# Patient Record
Sex: FEMALE | Race: WHITE | NOT HISPANIC OR LATINO | Employment: PART TIME | URBAN - METROPOLITAN AREA
[De-identification: names, ages, dates, MRNs, and addresses within clinical notes are randomized per-mention and may not be internally consistent; named-entity substitution may affect disease eponyms.]

---

## 2017-01-11 ENCOUNTER — OFFICE VISIT (OUTPATIENT)
Dept: BEHAVIORAL HEALTH | Facility: PHYSICIAN GROUP | Age: 57
End: 2017-01-11
Payer: COMMERCIAL

## 2017-01-11 DIAGNOSIS — F33.1 MODERATE EPISODE OF RECURRENT MAJOR DEPRESSIVE DISORDER (HCC): ICD-10-CM

## 2017-01-11 PROCEDURE — 90834 PSYTX W PT 45 MINUTES: CPT | Performed by: MARRIAGE & FAMILY THERAPIST

## 2017-01-11 NOTE — BH THERAPY
" [facility]  Therapy Progress Note    Patient Name: Adelia Durant  Patient MRN: [id number]  Today's Date: 1/11/2017     Type of session:Individual psychotherapy  Length of session: 45  Persons in attendance:Patient    Subjective/New Info: con't significant anxiety and depression; is involved in [employer] study, [employer], lunch with friends, gym, library,    Objective/Observations:   Participation: Active verbal participation   Grooming: Casual   Cognition: Alert   Eye contact: Good   Mood: Depressed and Anxious   Affect: Sad, Anxious and Tearful   Thought process: Logical   Speech: Rate within normal limits   Other:     Diagnoses:   1. Moderate episode of recurrent major depressive disorder (HCC)         Current risk:   SUICIDE: Not applicable   Homicide: Not applicable   Self-harm: Not applicable   Relapse: Not applicable   Other:    Safety Plan reviewed? Not Indicated   If evidence of imminent risk is present, intervention/plan:     Therapeutic Intervention(s): Cognitive modification, Positive behavior reinforced, Self-care skills and Stressors assessed    Treatment Goal(s)/Objective(s) addressed: reduction in anxious thoughts and depressed mood     Progress toward Treatment Goals: No change    Plan:  - \"Homework\" recommendation: thought stopping, interrupt anxious thought pattern (verse, prayer, etc), reflect on pockets of happiness-look forward to activities, consider increasing volunteer work  - Next appointment scheduled:  2/1/2017    Leana Oro  1/11/2017                                   "

## 2017-01-16 ENCOUNTER — HOSPITAL ENCOUNTER (OUTPATIENT)
Facility: MEDICAL CENTER | Age: 57
End: 2017-01-16
Attending: FAMILY MEDICINE
Payer: COMMERCIAL

## 2017-01-16 ENCOUNTER — OFFICE VISIT (OUTPATIENT)
Dept: MEDICAL GROUP | Facility: CLINIC | Age: 57
End: 2017-01-16
Payer: COMMERCIAL

## 2017-01-16 VITALS
DIASTOLIC BLOOD PRESSURE: 80 MMHG | SYSTOLIC BLOOD PRESSURE: 140 MMHG | HEART RATE: 78 BPM | RESPIRATION RATE: 18 BRPM | WEIGHT: 133 LBS | TEMPERATURE: 98.2 F | HEIGHT: 63 IN | OXYGEN SATURATION: 100 % | BODY MASS INDEX: 23.57 KG/M2

## 2017-01-16 DIAGNOSIS — Z72.820 SLEEP DEFICIENT: ICD-10-CM

## 2017-01-16 DIAGNOSIS — Z01.419 WELL WOMAN EXAM WITH ROUTINE GYNECOLOGICAL EXAM: ICD-10-CM

## 2017-01-16 DIAGNOSIS — Z00.00 LABORATORY EXAMINATION ORDERED AS PART OF A ROUTINE GENERAL MEDICAL EXAMINATION: ICD-10-CM

## 2017-01-16 DIAGNOSIS — Z12.39 SCREENING BREAST EXAMINATION: ICD-10-CM

## 2017-01-16 LAB — CYTOLOGY REG CYTOL: NORMAL

## 2017-01-16 PROCEDURE — 88175 CYTOPATH C/V AUTO FLUID REDO: CPT

## 2017-01-16 PROCEDURE — 99000 SPECIMEN HANDLING OFFICE-LAB: CPT | Performed by: FAMILY MEDICINE

## 2017-01-16 PROCEDURE — 99396 PREV VISIT EST AGE 40-64: CPT | Performed by: FAMILY MEDICINE

## 2017-01-16 RX ORDER — UBIDECARENONE 30 MG
CAPSULE ORAL
COMMUNITY
End: 2019-12-20

## 2017-01-16 ASSESSMENT — ENCOUNTER SYMPTOMS
FOCAL WEAKNESS: 0
ABDOMINAL PAIN: 0
TINGLING: 0
SHORTNESS OF BREATH: 0
TREMORS: 0
FEVER: 0
SENSORY CHANGE: 0
DEPRESSION: 0
HEADACHES: 0
SEIZURES: 0
SPEECH CHANGE: 0
BLOOD IN STOOL: 0
ORTHOPNEA: 0
DOUBLE VISION: 0
MEMORY LOSS: 0
BLURRED VISION: 0
DIARRHEA: 0
HEMOPTYSIS: 0
NAUSEA: 0
CONSTIPATION: 0
WHEEZING: 0
COUGH: 0
VOMITING: 0
HALLUCINATIONS: 0
DIZZINESS: 0
PALPITATIONS: 0
SORE THROAT: 0
NECK PAIN: 0
INSOMNIA: 0
WEIGHT LOSS: 0
LOSS OF CONSCIOUSNESS: 0
SPUTUM PRODUCTION: 0
MYALGIAS: 0
NERVOUS/ANXIOUS: 0
HEARTBURN: 0
BRUISES/BLEEDS EASILY: 0
BACK PAIN: 1
CHILLS: 0

## 2017-01-16 ASSESSMENT — LIFESTYLE VARIABLES: SUBSTANCE_ABUSE: 0

## 2017-01-16 NOTE — MR AVS SNAPSHOT
"Adelia Durant   2017 10:40 AM   Office Visit   MRN: [id number]    Department:  [facility]   Dept Phone:  [phone]    Description:  Female : 1960   Provider:  Stephanie Martínez M.D.           Reason for Visit     Annual Exam Annual PE and PAP      Allergies as of 2017     Allergen Noted Reactions    Pcn [Penicillins] 2010       Tetanus Toxoid 2015   Hives, Rash    Body aches      You were diagnosed with     Well woman exam with routine gynecological exam   [931218]       Laboratory examination ordered as part of a routine general medical examination   [V72.62.ICD-9-CM]       Sleep deficient   [480389]       Screening breast examination   [577497]         Vital Signs     Blood Pressure Pulse Temperature Respirations Height Weight    140/80 mmHg 78 36.8 °C (98.2 °F) 18 1.6 m (5' 3\") 60.328 kg (133 lb)    Body Mass Index Oxygen Saturation Last Menstrual Period Breastfeeding? Smoking Status       23.57 kg/m2 100% 2006 No Former Smoker       Basic Information     Date Of Birth Sex Race Ethnicity Preferred Language    1960 Female White Non- English      Your appointments     2017  2:00 PM   Individual Therapy with [facility]    [email or web address]  [address]  [place] [address]   [phone]            2017  9:00 AM   Individual Therapy with [facility]    [email or web address]  [address]  [place] [address]   [phone]            Mar 15, 2017  2:00 PM   Individual Therapy with [facility]    [email or web address]  [address]  [place] [address]   [phone]              Problem List              ICD-10-CM Priority Class Noted - Resolved    History of carcinoma in situ of cervix uteri Z86.008   2011 - Present    Family history of breast cancer in mother Z80.3   Unknown - Present    GERD (gastroesophageal reflux disease) K21.9   " 2/17/2015 - Present    Sleep deficient Z72.820   11/8/2016 - Present    Depression F32.9   11/8/2016 - Present    Moderate episode of recurrent major depressive disorder (CMS-HCC) F33.1   11/30/2016 - Present      Health Maintenance        Date Due Completion Dates    IMM DTaP/Tdap/Td Vaccine (1 - Tdap) 5/17/1977 ---    MAMMOGRAM 9/3/2016 9/3/2015, 4/28/2014, 9/24/2012, 8/11/2011, 8/10/2011 (Prv Comp), 6/18/2010, 6/18/2010, 3/17/2009, 3/17/2009, 2/6/2008, 2/6/2008, 9/23/2005    Override on 8/10/2011: Previously completed    PAP SMEAR 8/24/2017 8/24/2015, 8/6/2015 (Postponed), 4/18/2014, 9/18/2012 (Prv Comp), 9/10/2012, 7/27/2011, 6/4/2010    Override on 8/6/2015: Postponed    Override on 9/18/2012: Previously completed    COLONOSCOPY 4/4/2018 4/4/2013 (Prv Comp), 3/31/2011 (Prv Comp), 7/10/2008 (Prv Comp)    Override on 4/4/2013: Previously completed (diverticulosis)    Override on 3/31/2011: Previously completed (done 2008, due 2013)    Override on 7/10/2008: Previously completed            Current Immunizations     Hepatitis B Vaccine Non-Recombivax (Ped/Adol) 2/16/2015, 10/20/2014, 8/12/2014    Influenza TIV (IM) 10/8/2016    Influenza Vaccine Pediatric 10/10/2009    Influenza Vaccine Quad Inj (Pf) 10/31/2015    Tuberculin Skin Test 4/13/2014  9:30 AM, 4/3/2014      Below and/or attached are the medications your provider expects you to take. Review all of your home medications and newly ordered medications with your provider and/or pharmacist. Follow medication instructions as directed by your provider and/or pharmacist. Please keep your medication list with you and share with your provider. Update the information when medications are discontinued, doses are changed, or new medications (including over-the-counter products) are added; and carry medication information at all times in the event of emergency situations     Allergies:  PCN - (reactions not documented)     TETANUS TOXOID - Hives,Rash                  Medications  Valid as of: January 16, 2017 - 12:49 PM    Generic Name Brand Name Tablet Size Instructions for use    Multiple Vitamins-Minerals   Take  by mouth.        Multiple Vitamins-Minerals (Tab) MULTI FOR HER 50+  Take  by mouth.        RaNITidine HCl (Tab) ZANTAC 150 MG Take 1 Tab by mouth 2 times a day.        .                 Medicines prescribed today were sent to:     [id number] - [place] - [address]    [address] [address]    Phone: [phone] Fax: [phone]    Open 24 Hours?: No      Medication refill instructions:       If your prescription bottle indicates you have medication refills left, it is not necessary to call your provider’s office. Please contact your pharmacy and they will refill your medication.    If your prescription bottle indicates you do not have any refills left, you may request refills at any time through one of the following ways: The online [employer] system (except Urgent Care), by calling your provider’s office, or by asking your pharmacy to contact your provider’s office with a refill request. Medication refills are processed only during regular business hours and may not be available until the next business day. Your provider may request additional information or to have a follow-up visit with you prior to refilling your medication.   *Please Note: Medication refills are assigned a new Rx number when refilled electronically. Your pharmacy may indicate that no refills were authorized even though a new prescription for the same medication is available at the pharmacy. Please request the medicine by name with the pharmacy before contacting your provider for a refill.        Your To Do List     Future Labs/Procedures Complete By Expires    CBC WITHOUT DIFFERENTIAL  As directed 7/19/2017    COMP METABOLIC PANEL  As directed 1/17/2018    LIPID PROFILE  As directed 1/17/2018    MA-SCREEN MAMMO W/CAD-BILAT  As directed 1/16/2018    THINPREP PAP, REFLEX HPV ON  ASC-US AND ABOVE  As directed 1/16/2018    VITAMIN D,25 HYDROXY  As directed 1/17/2018         MyChart Access Code: Activation code not generated  Current [employer] Status: Active

## 2017-01-16 NOTE — PROGRESS NOTES
Subjective:      Adelia Durant is a 56 y.o. female who presents with Annual Exam         She is here for her Well woman examination with pap.  She has been getting them yearly due to remote history of cervical KAREN I      Annual Exam  Associated symptoms include congestion. Pertinent negatives include no abdominal pain, chest pain, chills, coughing, fever, headaches, myalgias, nausea, neck pain, rash, sore throat or vomiting.      has a past medical history of GERD (gastroesophageal reflux disease); Multiple gastric polyps (2008); Family history of breast cancer in mother; Family history of colon cancer; Joint stiffness of hand; Carcinoma in situ of cervix (1980); Cataracts, bilateral; Depression; and Moderate episode of recurrent major depressive disorder (CMS-[facility]) (11/30/2016).   has past surgical history that includes dilation and curettage cone biopsy (1979); colonoscopy (2008, 4/2013); and cataract extraction with iol.  family history includes Alcohol abuse in her brother, father, and sister; Bipolar disorder in her father; Cancer in her maternal grandmother; Cancer (age of onset: 66) in her father and mother; Drug abuse in her brother; Heart Disease in her mother; Heart Disease (age of onset: 42) in her maternal uncle; Hypertension in her mother; Stroke in her maternal grandmother and paternal grandmother; Suicide Attempts in her child.   reports that she quit smoking about 33 years ago. Her smoking use included Cigarettes. She has a 2.5 pack-year smoking history. She has never used smokeless tobacco. She reports that she drinks about 4.2 oz of alcohol per week. She reports that she does not use illicit drugs.      Current outpatient prescriptions:   •  Multiple Vitamins-Minerals (MULTI FOR HER 50+) Tab, Take  by mouth., Disp: , Rfl:   •  ranitidine (ZANTAC) 150 MG Tab, Take 1 Tab by mouth 2 times a day., Disp: 60 Tab, Rfl: 11  •  Multiple Vitamins-Minerals (CENTRUM SILVER PO), Take  by mouth., Disp: , Rfl:  "  is allergic to pcn and tetanus toxoid.      Review of Systems   Constitutional: Negative for fever, chills, weight loss and malaise/fatigue.   HENT: Positive for congestion. Negative for hearing loss, nosebleeds and sore throat.    Eyes: Negative for blurred vision and double vision.   Respiratory: Negative for cough, hemoptysis, sputum production, shortness of breath and wheezing.    Cardiovascular: Negative for chest pain, palpitations, orthopnea and leg swelling.   Gastrointestinal: Negative for heartburn, nausea, vomiting, abdominal pain, diarrhea, constipation and blood in stool.        The zantac controls her symptoms most of the time   Genitourinary: Negative for dysuria, urgency and frequency.   Musculoskeletal: Positive for back pain. Negative for myalgias, joint pain and neck pain.        A little lower back stiffness.   Skin: Negative for rash.   Neurological: Negative for dizziness, tingling, tremors, sensory change, speech change, focal weakness, seizures, loss of consciousness and headaches.   Endo/Heme/Allergies: Positive for environmental allergies. Does not bruise/bleed easily.   Psychiatric/Behavioral: Negative for depression, suicidal ideas, hallucinations, memory loss and substance abuse. The patient is not nervous/anxious and does not have insomnia.           Objective:     /80 mmHg  Pulse 78  Temp(Src) 36.8 °C (98.2 °F)  Resp 18  Ht 1.6 m (5' 3\")  Wt 60.328 kg (133 lb)  BMI 23.57 kg/m2  SpO2 100%  LMP 07/27/2006  Breastfeeding? No     Physical Exam   Constitutional: She is oriented to person, place, and time. She appears well-developed and well-nourished. No distress.   HENT:   Head: Normocephalic and atraumatic.   Mouth/Throat: Oropharynx is clear and moist.   Eyes: EOM are normal. Pupils are equal, round, and reactive to light. Left eye exhibits no discharge.   Neck: Normal range of motion. Neck supple. No JVD present. No thyromegaly present.   Cardiovascular: Normal rate, " regular rhythm and normal heart sounds.    No murmur heard.  Pulmonary/Chest: Effort normal and breath sounds normal. No respiratory distress. She has no wheezes. She has no rales. Right breast exhibits no inverted nipple, no mass, no nipple discharge, no skin change and no tenderness. Left breast exhibits no inverted nipple, no mass, no nipple discharge, no skin change and no tenderness. Breasts are symmetrical.   Abdominal: Soft. Bowel sounds are normal. She exhibits no distension and no mass. There is no tenderness.   Genitourinary: Vagina normal and uterus normal. No breast tenderness, discharge or bleeding. Cervix exhibits no discharge. Right adnexum displays no mass. Left adnexum displays no mass. No vaginal discharge found.   Pap smear (brush and spatula) taken and sent   Musculoskeletal: Normal range of motion. She exhibits no edema.   Lymphadenopathy:     She has no cervical adenopathy.   Neurological: She is alert and oriented to person, place, and time. Coordination normal.   Skin: Skin is warm and dry. No rash noted. No erythema.   Psychiatric: She has a normal mood and affect. Her behavior is normal.   Vitals reviewed.              Assessment/Plan:     1. Well woman exam with routine gynecological exam  Generally well, await Pap  - THINPREP PAP, REFLEX HPV ON ASC-US AND ABOVE; Future    2. Laboratory examination ordered as part of a routine general medical examination  Routine orders placed  - COMP METABOLIC PANEL; Future  - LIPID PROFILE; Future  - CBC WITHOUT DIFFERENTIAL; Future    3. Sleep deficient  Order placed and discussed  - VITAMIN D,25 HYDROXY; Future    4. Screening breast examination  Routine orders placed  - MA-SCREEN MAMMO W/CAD-BILAT; Future

## 2017-01-17 ENCOUNTER — HOSPITAL ENCOUNTER (OUTPATIENT)
Dept: LAB | Facility: MEDICAL CENTER | Age: 57
End: 2017-01-17
Attending: FAMILY MEDICINE
Payer: COMMERCIAL

## 2017-01-17 DIAGNOSIS — Z00.00 LABORATORY EXAMINATION ORDERED AS PART OF A ROUTINE GENERAL MEDICAL EXAMINATION: ICD-10-CM

## 2017-01-17 DIAGNOSIS — Z72.820 SLEEP DEFICIENT: ICD-10-CM

## 2017-01-17 LAB
25(OH)D3 SERPL-MCNC: 27 NG/ML (ref 30–100)
ALBUMIN SERPL BCP-MCNC: 4.2 G/DL (ref 3.2–4.9)
ALBUMIN/GLOB SERPL: 1.2 G/DL
ALP SERPL-CCNC: 49 U/L (ref 30–99)
ALT SERPL-CCNC: 14 U/L (ref 2–50)
ANION GAP SERPL CALC-SCNC: 6 MMOL/L (ref 0–11.9)
AST SERPL-CCNC: 16 U/L (ref 12–45)
BILIRUB SERPL-MCNC: 0.5 MG/DL (ref 0.1–1.5)
BUN SERPL-MCNC: 16 MG/DL (ref 8–22)
CALCIUM SERPL-MCNC: 9.3 MG/DL (ref 8.5–10.5)
CHLORIDE SERPL-SCNC: 108 MMOL/L (ref 96–112)
CHOLEST SERPL-MCNC: 176 MG/DL (ref 100–199)
CO2 SERPL-SCNC: 27 MMOL/L (ref 20–33)
CREAT SERPL-MCNC: 0.78 MG/DL (ref 0.5–1.4)
ERYTHROCYTE [DISTWIDTH] IN BLOOD BY AUTOMATED COUNT: 46.8 FL (ref 35.9–50)
GLOBULIN SER CALC-MCNC: 3.4 G/DL (ref 1.9–3.5)
GLUCOSE SERPL-MCNC: 92 MG/DL (ref 65–99)
HCT VFR BLD AUTO: 42.5 % (ref 37–47)
HDLC SERPL-MCNC: 88 MG/DL
HGB BLD-MCNC: 13.7 G/DL (ref 12–16)
LDLC SERPL CALC-MCNC: 76 MG/DL
MCH RBC QN AUTO: 31.7 PG (ref 27–33)
MCHC RBC AUTO-ENTMCNC: 32.2 G/DL (ref 33.6–35)
MCV RBC AUTO: 98.4 FL (ref 81.4–97.8)
PLATELET # BLD AUTO: 280 K/UL (ref 164–446)
PMV BLD AUTO: 10.1 FL (ref 9–12.9)
POTASSIUM SERPL-SCNC: 4 MMOL/L (ref 3.6–5.5)
PROT SERPL-MCNC: 7.6 G/DL (ref 6–8.2)
RBC # BLD AUTO: 4.32 M/UL (ref 4.2–5.4)
SODIUM SERPL-SCNC: 141 MMOL/L (ref 135–145)
TRIGL SERPL-MCNC: 59 MG/DL (ref 0–149)
WBC # BLD AUTO: 4 K/UL (ref 4.8–10.8)

## 2017-01-17 PROCEDURE — 82306 VITAMIN D 25 HYDROXY: CPT

## 2017-01-17 PROCEDURE — 85027 COMPLETE CBC AUTOMATED: CPT

## 2017-01-17 PROCEDURE — 80061 LIPID PANEL: CPT

## 2017-01-17 PROCEDURE — 36415 COLL VENOUS BLD VENIPUNCTURE: CPT

## 2017-01-17 PROCEDURE — 80053 COMPREHEN METABOLIC PANEL: CPT

## 2017-02-01 ENCOUNTER — OFFICE VISIT (OUTPATIENT)
Dept: BEHAVIORAL HEALTH | Facility: PHYSICIAN GROUP | Age: 57
End: 2017-02-01
Payer: COMMERCIAL

## 2017-02-01 DIAGNOSIS — F33.1 MODERATE EPISODE OF RECURRENT MAJOR DEPRESSIVE DISORDER (HCC): ICD-10-CM

## 2017-02-01 PROCEDURE — 90834 PSYTX W PT 45 MINUTES: CPT | Performed by: MARRIAGE & FAMILY THERAPIST

## 2017-02-02 NOTE — BH THERAPY
" [facility]  Therapy Progress Note    Patient Name: Adelia Durant  Patient MRN: [id number]  Today's Date: 2/1/2017     Type of session:Individual psychotherapy  Length of session: 45  Persons in attendance:Patient    Subjective/New Info: Adelia stated she feels she can't \"pull herself out of it\" (depressed mood)-has had an ongoing cold that is draining her physical energy and adding to her depressed mood.  She's still ambivalent about starting an anti-depressant but is willing to consider asking PCP about it.  PCP wants to see lab work results before referring for a sleep study.  Adelia describes herself as \"absent minded\" which in turns impacts her remembering to use techniques to combat depression and anxiety (absent mindedness tends to be when she is stressed or experiencing an increase in anxiety).      Objective/Observations:   Participation: Active verbal participation   Grooming: Casual   Cognition: Alert   Eye contact: Good   Mood: Depressed and Anxious   Affect: Flexible   Thought process: Logical   Speech: Rate within normal limits   Other:     Diagnoses:   1. Moderate episode of recurrent major depressive disorder (CMS-[facility])         Current risk:   SUICIDE: Not applicable   Homicide: Not applicable   Self-harm: Not applicable   Relapse: Not applicable   Other:    Safety Plan reviewed? Not Indicated   If evidence of imminent risk is present, intervention/plan:     Therapeutic Intervention(s): Cognitive modification, Positive behavior reinforced, Self-care skills and Stressors assessed    Treatment Goal(s)/Objective(s) addressed: cognitive restructuring and thought stopping for anxiousdep thoughts; behaviors to help lift her mood;      Progress toward Treatment Goals: Mild improvement and Adelia did implement music at times to help with mood    Plan:  - \"Homework\" recommendation: con't to work on cognitive restructuring and behaviors to manage stress, anxiety and depression  - Next " appointment scheduled:  2/28/2017    Leana Oro  2/1/2017

## 2017-02-10 ENCOUNTER — OFFICE VISIT (OUTPATIENT)
Dept: URGENT CARE | Facility: PHYSICIAN GROUP | Age: 57
End: 2017-02-10
Payer: COMMERCIAL

## 2017-02-10 VITALS
WEIGHT: 133 LBS | RESPIRATION RATE: 20 BRPM | SYSTOLIC BLOOD PRESSURE: 146 MMHG | HEIGHT: 63 IN | HEART RATE: 80 BPM | DIASTOLIC BLOOD PRESSURE: 80 MMHG | TEMPERATURE: 97.9 F | BODY MASS INDEX: 23.57 KG/M2 | OXYGEN SATURATION: 98 %

## 2017-02-10 DIAGNOSIS — E55.9 VITAMIN D DEFICIENCY: ICD-10-CM

## 2017-02-10 DIAGNOSIS — J34.89 NASAL SORE: ICD-10-CM

## 2017-02-10 DIAGNOSIS — J01.00 ACUTE NON-RECURRENT MAXILLARY SINUSITIS: ICD-10-CM

## 2017-02-10 PROCEDURE — 99214 OFFICE O/P EST MOD 30 MIN: CPT | Performed by: PHYSICIAN ASSISTANT

## 2017-02-10 RX ORDER — AZITHROMYCIN 250 MG/1
TABLET, FILM COATED ORAL
Qty: 6 TAB | Refills: 0 | Status: SHIPPED | OUTPATIENT
Start: 2017-02-10 | End: 2017-06-07

## 2017-02-10 RX ORDER — CHLORAL HYDRATE 500 MG
1000 CAPSULE ORAL
COMMUNITY
End: 2023-06-20

## 2017-02-10 ASSESSMENT — ENCOUNTER SYMPTOMS
WHEEZING: 0
VOMITING: 0
SINUS PRESSURE: 1
HEADACHES: 1
SPUTUM PRODUCTION: 1
PALPITATIONS: 0
NAUSEA: 0
CHILLS: 1
DIARRHEA: 0
MYALGIAS: 0
SORE THROAT: 0
FEVER: 1
COUGH: 1
SHORTNESS OF BREATH: 0
ABDOMINAL PAIN: 0

## 2017-02-10 ASSESSMENT — PAIN SCALES - GENERAL: PAINLEVEL: NO PAIN

## 2017-02-10 NOTE — PROGRESS NOTES
Subjective:      Adelia Durant is a 56 y.o. female who presents with Sinus Problem            Sinus Problem  This is a new problem. The current episode started 1 to 4 weeks ago. The problem has been gradually worsening since onset. There has been no fever. Associated symptoms include chills, congestion, coughing, ear pain, headaches and sinus pressure. Pertinent negatives include no shortness of breath or sore throat. Past treatments include oral decongestants (OTC cough). The treatment provided mild relief.   Patient states when she gets sinus infections she gets sores on her nose. Bactroban always helps resolve this problem.    Patient is asking for her lab results from her recent primary care visit.      PMH:  has a past medical history of GERD (gastroesophageal reflux disease); Multiple gastric polyps (2008); Family history of breast cancer in mother; Family history of colon cancer; Joint stiffness of hand; Carcinoma in situ of cervix (1980); Cataracts, bilateral; Depression; and Moderate episode of recurrent major depressive disorder (CMS-HCC) (11/30/2016).  MEDS:   Current outpatient prescriptions:   •  Omega-3 Fatty Acids (FISH OIL) 1000 MG Cap capsule, Take 1,000 mg by mouth 3 times a day, with meals., Disp: , Rfl:   •  Multiple Vitamins-Minerals (MULTI FOR HER 50+) Tab, Take  by mouth., Disp: , Rfl:   •  ranitidine (ZANTAC) 150 MG Tab, Take 1 Tab by mouth 2 times a day., Disp: 60 Tab, Rfl: 11  •  Multiple Vitamins-Minerals (CENTRUM SILVER PO), Take  by mouth., Disp: , Rfl:   ALLERGIES:   Allergies   Allergen Reactions   • Pcn [Penicillins]    • Tetanus Toxoid Hives and Rash     Body aches     SURGHX:   Past Surgical History   Procedure Laterality Date   • Dilation and curettage cone biopsy  1979     abnormal pap, carcinoma in situ   • Colonoscopy  2008, 4/2013     normal except for diverticulosis, Dr. Ozuna   • Cataract extraction with iol       Dr. Finn     SOCHX:  reports that she quit smoking about  "33 years ago. Her smoking use included Cigarettes. She has a 2.5 pack-year smoking history. She has never used smokeless tobacco. She reports that she drinks about 4.2 oz of alcohol per week. She reports that she does not use illicit drugs.  FH: family history includes Alcohol abuse in her brother, father, and sister; Bipolar disorder in her father; Cancer in her maternal grandmother; Cancer (age of onset: 66) in her father and mother; Drug abuse in her brother; Heart Disease in her mother; Heart Disease (age of onset: 42) in her maternal uncle; Hypertension in her mother; Stroke in her maternal grandmother and paternal grandmother; Suicide Attempts in her child.    Review of Systems   Constitutional: Positive for fever, chills and malaise/fatigue.   HENT: Positive for congestion, ear pain and sinus pressure. Negative for sore throat.         Nasal sores   Respiratory: Positive for cough and sputum production (Green). Negative for shortness of breath and wheezing.    Cardiovascular: Negative for chest pain, palpitations and leg swelling.   Gastrointestinal: Negative for nausea, vomiting, abdominal pain and diarrhea.   Musculoskeletal: Negative for myalgias.   Neurological: Positive for headaches.       Medications, Allergies, and current problem list reviewed today in Epic     Objective:     /80 mmHg  Pulse 80  Temp(Src) 36.6 °C (97.9 °F)  Resp 20  Ht 1.6 m (5' 3\")  Wt 60.328 kg (133 lb)  BMI 23.57 kg/m2  SpO2 98%  LMP 07/27/2006  Breastfeeding? No     Physical Exam   Constitutional: She is oriented to person, place, and time. She appears well-developed and well-nourished. No distress.   HENT:   Head: Normocephalic and atraumatic.   Right Ear: Hearing, tympanic membrane, external ear and ear canal normal. Tympanic membrane is not erythematous. No decreased hearing is noted.   Left Ear: Hearing, tympanic membrane, external ear and ear canal normal. Tympanic membrane is not erythematous. No decreased " hearing is noted.   Nose: Right sinus exhibits maxillary sinus tenderness. Left sinus exhibits maxillary sinus tenderness.   Mouth/Throat: Normal dentition. Posterior oropharyngeal erythema present. No oropharyngeal exudate.   Several scabs seen in the bilateral nostrils with dried blood.   Eyes: Conjunctivae and EOM are normal. Pupils are equal, round, and reactive to light. Right eye exhibits no discharge. Left eye exhibits no discharge. No scleral icterus.   Neck: Normal range of motion. Neck supple.   Cardiovascular: Normal rate, regular rhythm and normal heart sounds.    No murmur heard.  Pulmonary/Chest: Effort normal and breath sounds normal. No respiratory distress. She has no wheezes.   Lymphadenopathy:        Head (right side): Submandibular adenopathy present.        Head (left side): Submandibular adenopathy present.     She has no cervical adenopathy.        Right cervical: No superficial cervical adenopathy present.       Left cervical: No superficial cervical adenopathy present.   Neurological: She is alert and oriented to person, place, and time.   Skin: Skin is warm, dry and intact. She is not diaphoretic. No cyanosis. Nails show no clubbing.   Psychiatric: She has a normal mood and affect. Her behavior is normal. Judgment and thought content normal.   Nursing note and vitals reviewed.              Assessment/Plan:     1. Acute non-recurrent maxillary sinusitis  azithromycin (ZITHROMAX) 250 MG Tab   2. Nasal sore  mupirocin (BACTROBAN) 2 % Ointment   3. Vitamin D deficiency       Take full course of antibiotic  Tylenol and Motrin for fever and pain  OTC meds as discussed   Increase fluids and rest  Nasal spray, nasal wash, Rosie pot  Bactroban for sores in nose twice a day    All recent lab work discussed with patient. She has a mild vitamin D deficiency. She was told to begin taking 2000 units of vitamin D daily. Follow-up with primary care.    Return to clinic or go to ED if symptoms worsen or  persist. Indications for ED discussed at length. Patient voices understanding. Follow-up with your primary care provider in 3-5 days. Red flags discussed.    Please note that this dictation was created using voice recognition software. I have made every reasonable attempt to correct obvious errors, but I expect that there are errors of grammar and possibly content that I did not discover before finalizing the note.

## 2017-02-28 ENCOUNTER — OFFICE VISIT (OUTPATIENT)
Dept: BEHAVIORAL HEALTH | Facility: PHYSICIAN GROUP | Age: 57
End: 2017-02-28
Payer: COMMERCIAL

## 2017-02-28 DIAGNOSIS — F41.9 ANXIETY: ICD-10-CM

## 2017-02-28 DIAGNOSIS — F33.1 MODERATE EPISODE OF RECURRENT MAJOR DEPRESSIVE DISORDER (HCC): ICD-10-CM

## 2017-02-28 PROCEDURE — 90834 PSYTX W PT 45 MINUTES: CPT | Performed by: MARRIAGE & FAMILY THERAPIST

## 2017-02-28 NOTE — BH THERAPY
" [facility]  Therapy Progress Note    Patient Name: Adelia Durant  Patient MRN: [id number]  Today's Date: 2/28/2017     Type of session:Individual psychotherapy  Length of session: 50  Persons in attendance:Patient    Subjective/New Info: Diana reported a mild decrease in depression and anxiety- + outcomes with knitting, music, lighting, normalizing mistakes, & thought stopping.  Diana con't to struggle with restructuring neg/anx thoughts and letting goof those thoughts.  Is taking Vit D as labs indicated she was low; acknowledges her mood lifts on kellee days.     Objective/Observations:   Participation: Active verbal participation   Grooming: Casual   Cognition: Alert   Eye contact: Good   Mood: Depressed and Anxious   Affect: Flexible   Thought process: Logical   Speech: Rate within normal limits   Other:     Diagnoses:   1. Moderate episode of recurrent major depressive disorder (CMS-HCC)    2. Anxiety         Current risk:   SUICIDE: Not applicable   Homicide: Not applicable   Self-harm: Not applicable   Relapse: Not applicable   Other:    Safety Plan reviewed? Not Indicated   If evidence of imminent risk is present, intervention/plan:     Therapeutic Intervention(s): Cognitive modification, Maladaptive behavior addressed, Positive behavior reinforced, Self-care skills and Stressors assessed    Treatment Goal(s)/Objective(s) addressed: cog. Modification of negative and anxious thoughts     Progress toward Treatment Goals: Mild improvement    Plan:  - \"Homework\" recommendation: con't to use lighting, music, knitting, etc. To maintain improvement; use coloring, knitting etc. Vs tv if awakens during the night-can't go back to sleep get up and use the time for prayer/journaling/reading  - Next appointment scheduled:  3/15/2017    Leana Oro  2/28/2017                                   "

## 2017-03-15 ENCOUNTER — OFFICE VISIT (OUTPATIENT)
Dept: BEHAVIORAL HEALTH | Facility: PHYSICIAN GROUP | Age: 57
End: 2017-03-15
Payer: COMMERCIAL

## 2017-03-15 DIAGNOSIS — F33.1 MODERATE EPISODE OF RECURRENT MAJOR DEPRESSIVE DISORDER (HCC): ICD-10-CM

## 2017-03-15 PROCEDURE — 90834 PSYTX W PT 45 MINUTES: CPT | Performed by: MARRIAGE & FAMILY THERAPIST

## 2017-03-15 NOTE — BH THERAPY
" [facility]  Therapy Progress Note    Patient Name: Adelia Durant  Patient MRN: [id number]  Today's Date: 3/15/2017     Type of session:Individual psychotherapy  Length of session: 45  Persons in attendance:Patient    Subjective/New Info: Ramakrishna was called to report for jury duty-interferes with her plans to visit her son in [place].  Spoke today of frightening and sad incidents from her childhood-likely developed into anxiety she con't to experience.      Objective/Observations:   Participation: Active verbal participation   Grooming: Casual   Cognition: Alert   Eye contact: Good   Mood: Depressed and Anxious   Affect: Sad and Tearful   Thought process: Logical   Speech: Rate within normal limits   Other:     Diagnoses:   1. Moderate episode of recurrent major depressive disorder (CMS-HCC)         Current risk:   SUICIDE: Not applicable   Homicide: Not applicable   Self-harm: Not applicable   Relapse: Not applicable   Other:    Safety Plan reviewed? Not Indicated   If evidence of imminent risk is present, intervention/plan:     Therapeutic Intervention(s): Cognitive modification, Parenting/familial roles addressed, Positive behavior reinforced, Self-care skills and Stressors assessed    Treatment Goal(s)/Objective(s) addressed: effectively managing anxious/depressive thoughts and resulting behaviors     Progress toward Treatment Goals: Mild improvement and Ramakrishna is beginning to delve into deeper issues    Plan:  - \"Homework\" recommendation: allow yourself to enjoy little pockets of happiness/sereness  - Next appointment scheduled:  4/4/2017    Leana Oro  3/15/2017                                   "

## 2017-03-29 ENCOUNTER — NON-PROVIDER VISIT (OUTPATIENT)
Dept: URGENT CARE | Facility: PHYSICIAN GROUP | Age: 57
End: 2017-03-29

## 2017-03-29 DIAGNOSIS — Z11.1 SPECIAL SCREENING EXAMINATION FOR RESPIRATORY TUBERCULOSIS: ICD-10-CM

## 2017-03-29 PROCEDURE — 86580 TB INTRADERMAL TEST: CPT | Performed by: FAMILY MEDICINE

## 2017-03-31 ENCOUNTER — NON-PROVIDER VISIT (OUTPATIENT)
Dept: URGENT CARE | Facility: PHYSICIAN GROUP | Age: 57
End: 2017-03-31

## 2017-03-31 DIAGNOSIS — Z11.1 ENCOUNTER FOR PPD SKIN TEST READING: ICD-10-CM

## 2017-03-31 LAB — TB WHEAL 3D P 5 TU DIAM: NORMAL MM

## 2017-03-31 NOTE — MR AVS SNAPSHOT
Adelia Durant   3/31/2017 11:45 AM   Non-Provider Visit   MRN: [id number]    Department:  [facility]   Dept Phone:  [phone]    Description:  Female : 1960   Provider:  [facility]           Reason for Visit     PPD Reading 1 of 1      Allergies as of 3/31/2017     Allergen Noted Reactions    Pcn [Penicillins] 2010       Tetanus Toxoid 2015   Hives, Rash    Body aches      You were diagnosed with     Encounter for PPD skin test reading   [[id number]]         Vital Signs     Last Menstrual Period Smoking Status                2006 Former Smoker          Basic Information     Date Of Birth Sex Race Ethnicity Preferred Language    1960 Female White Non- English      Your appointments     2017  9:00 AM   Individual Therapy with [facility] ([place])    [address]  [address]  [place] [id number] [phone]            May 10, 2017  2:00 PM   Individual Therapy with [facility] ([place])    [address]  [address]  [place] [id number] [phone]              Problem List              ICD-10-CM Priority Class Noted - Resolved    History of carcinoma in situ of cervix uteri Z86.008   2011 - Present    Family history of breast cancer in mother Z80.3   Unknown - Present    GERD (gastroesophageal reflux disease) K21.9   2015 - Present    Sleep deficient Z72.820   2016 - Present    Depression F32.9   2016 - Present    Moderate episode of recurrent major depressive disorder (CMS-HCC) F33.1   2016 - Present    Anxiety F41.9   2017 - Present      Health Maintenance        Date Due Completion Dates    IMM DTaP/Tdap/Td Vaccine (1 - Tdap) 1977 ---    MAMMOGRAM 9/3/2016 9/3/2015, 2014, 2012, 2011, 8/10/2011 (Prv Comp), 2010, 2010, 3/17/2009, 3/17/2009, 2008, 2008, 2005    Override on 8/10/2011: Previously completed    COLONOSCOPY 4/4/2018 4/4/2013 (Prv Comp), 3/31/2011 (Prv Comp), 7/10/2008 (Prv Comp)    Override on 4/4/2013: Previously completed (diverticulosis)    Override on 3/31/2011: Previously completed (done 2008, due 2013)    Override on 7/10/2008: Previously completed    PAP SMEAR 1/16/2019 1/16/2017, 8/24/2015, 8/6/2015 (Postponed), 4/18/2014, 9/18/2012 (Prv Comp), 9/10/2012, 7/27/2011, 6/4/2010    Override on 8/6/2015: Postponed    Override on 9/18/2012: Previously completed            Current Immunizations     Hepatitis B Vaccine Non-Recombivax (Ped/Adol) 2/16/2015, 10/20/2014, 8/12/2014    Influenza TIV (IM) 10/8/2016    Influenza Vaccine Pediatric 10/10/2009    Influenza Vaccine Quad Inj (Pf) 10/31/2015    Tuberculin Skin Test 3/29/2017, 4/13/2014  9:30 AM, 4/3/2014      Below and/or attached are the medications your provider expects you to take. Review all of your home medications and newly ordered medications with your provider and/or pharmacist. Follow medication instructions as directed by your provider and/or pharmacist. Please keep your medication list with you and share with your provider. Update the information when medications are discontinued, doses are changed, or new medications (including over-the-counter products) are added; and carry medication information at all times in the event of emergency situations     Allergies:  PCN - (reactions not documented)     TETANUS TOXOID - Hives,Rash               Medications  Valid as of: March 31, 2017 - 11:51 AM    Generic Name Brand Name Tablet Size Instructions for use    Azithromycin (Tab) ZITHROMAX 250 MG Z-stephen, Use as directed.        Multiple Vitamins-Minerals   Take  by mouth.        Multiple Vitamins-Minerals (Tab) MULTI FOR HER 50+  Take  by mouth.        Mupirocin (Ointment) BACTROBAN 2 % Apply 1 Application to affected area(s) 2 times a day.        Omega-3 Fatty Acids (Cap) fish oil 1000 MG Take 1,000 mg by mouth 3 times a day, with meals.         RaNITidine HCl (Tab) ZANTAC 150 MG Take 1 Tab by mouth 2 times a day.        .                 Medicines prescribed today were sent to:     [id number] - [place] - [address] [place] [address]    Phone: [phone] Fax: [phone]    Open 24 Hours?: No      Medication refill instructions:       If your prescription bottle indicates you have medication refills left, it is not necessary to call your provider’s office. Please contact your pharmacy and they will refill your medication.    If your prescription bottle indicates you do not have any refills left, you may request refills at any time through one of the following ways: The online Enbridge system (except Urgent Care), by calling your provider’s office, or by asking your pharmacy to contact your provider’s office with a refill request. Medication refills are processed only during regular business hours and may not be available until the next business day. Your provider may request additional information or to have a follow-up visit with you prior to refilling your medication.   *Please Note: Medication refills are assigned a new Rx number when refilled electronically. Your pharmacy may indicate that no refills were authorized even though a new prescription for the same medication is available at the pharmacy. Please request the medicine by name with the pharmacy before contacting your provider for a refill.           Enbridge Access Code: Activation code not generated  Current Enbridge Status: Active

## 2017-03-31 NOTE — PROGRESS NOTES
Adelia Durant is a 56 y.o. female here for a non-provider visit for PPD reading -- Step 1 of 1.      1.  Resulted in Epic under enter/edit results? Yes   2.  TB evaluation questionnaire scanned into chart and original given to patient?Yes      3. Was induration greater than 0 mm? No.        Routed to PCP? No

## 2017-04-04 ENCOUNTER — OFFICE VISIT (OUTPATIENT)
Dept: BEHAVIORAL HEALTH | Facility: PHYSICIAN GROUP | Age: 57
End: 2017-04-04
Payer: COMMERCIAL

## 2017-04-04 DIAGNOSIS — F33.1 MODERATE EPISODE OF RECURRENT MAJOR DEPRESSIVE DISORDER (HCC): ICD-10-CM

## 2017-04-04 DIAGNOSIS — F41.1 GAD (GENERALIZED ANXIETY DISORDER): ICD-10-CM

## 2017-04-04 PROCEDURE — 90834 PSYTX W PT 45 MINUTES: CPT | Performed by: MARRIAGE & FAMILY THERAPIST

## 2017-04-04 NOTE — PROGRESS NOTES
Adelia is looking forward to this weekend when she and her H will be leaving for [place] to visit their son.  She will fly from [place] to [place] to visit her family.  Ramakrishna has been using strategies learned in session (music, knitting, etc.) to help manage her stress; she reported + outcomes.  Ramakrishna spoke more about past childhood experiences that have recently surfaced; she has good insight as to how these experiences have manifested itself in anxiety/dep.

## 2017-05-10 ENCOUNTER — OFFICE VISIT (OUTPATIENT)
Dept: BEHAVIORAL HEALTH | Facility: PHYSICIAN GROUP | Age: 57
End: 2017-05-10
Payer: COMMERCIAL

## 2017-05-10 DIAGNOSIS — F41.1 GAD (GENERALIZED ANXIETY DISORDER): ICD-10-CM

## 2017-05-10 PROCEDURE — 90834 PSYTX W PT 45 MINUTES: CPT | Performed by: MARRIAGE & FAMILY THERAPIST

## 2017-06-01 ENCOUNTER — HOSPITAL ENCOUNTER (OUTPATIENT)
Dept: RADIOLOGY | Facility: MEDICAL CENTER | Age: 57
End: 2017-06-01
Attending: FAMILY MEDICINE
Payer: COMMERCIAL

## 2017-06-01 DIAGNOSIS — Z12.31 VISIT FOR SCREENING MAMMOGRAM: ICD-10-CM

## 2017-06-01 DIAGNOSIS — Z12.39 SCREENING BREAST EXAMINATION: ICD-10-CM

## 2017-06-01 PROCEDURE — 77063 BREAST TOMOSYNTHESIS BI: CPT

## 2017-06-07 ENCOUNTER — OFFICE VISIT (OUTPATIENT)
Dept: MEDICAL GROUP | Facility: CLINIC | Age: 57
End: 2017-06-07
Payer: COMMERCIAL

## 2017-06-07 VITALS
HEIGHT: 63 IN | OXYGEN SATURATION: 100 % | HEART RATE: 65 BPM | DIASTOLIC BLOOD PRESSURE: 62 MMHG | RESPIRATION RATE: 18 BRPM | WEIGHT: 134 LBS | TEMPERATURE: 98.6 F | SYSTOLIC BLOOD PRESSURE: 126 MMHG | BODY MASS INDEX: 23.74 KG/M2

## 2017-06-07 DIAGNOSIS — R42 INTERMITTENT VERTIGO: ICD-10-CM

## 2017-06-07 PROCEDURE — 99214 OFFICE O/P EST MOD 30 MIN: CPT | Performed by: NURSE PRACTITIONER

## 2017-06-07 RX ORDER — ONDANSETRON 4 MG/1
4 TABLET, FILM COATED ORAL EVERY 4 HOURS PRN
Qty: 20 TAB | Refills: 1 | Status: SHIPPED | OUTPATIENT
Start: 2017-06-07 | End: 2018-05-15 | Stop reason: SDUPTHER

## 2017-06-07 RX ORDER — FLUTICASONE PROPIONATE 50 MCG
1 SPRAY, SUSPENSION (ML) NASAL DAILY
Qty: 16 G | Refills: 5 | Status: SHIPPED | OUTPATIENT
Start: 2017-06-07 | End: 2018-05-15

## 2017-06-07 ASSESSMENT — PATIENT HEALTH QUESTIONNAIRE - PHQ9: CLINICAL INTERPRETATION OF PHQ2 SCORE: 0

## 2017-06-08 NOTE — PROGRESS NOTES
CC: Dizziness        HPI:     Adelia is a patient of Dr. Martínez, all problems are new to me today presents today for the followin. Intermittent vertigo  Tells me that over the last year she's had 2-5 episodes of self resolving dizziness. During the bad episodes which may be were about 2 of these episodes she had some associated nausea. The last most recent episode happened last week. She woke up feeling fine got up out of bed medially felt the room was spinning and felt like she was given a topple forward then she began having some nausea and vomiting. She states during that morning the symptoms resolved on her own over time.    She has had increased pressure of the left ear over the last several weeks. She tried Allegra-D which made her feel kind of sick and hyper. She tried Flonase but she was concerned as she is prone to nasal ulcers.    Previously all of her other episode self resolved over a day or 2.    Always triggered by movement of the head. She felt that she kept her head perfectly still that she was able to feel fine.    Currently has not had the symptoms for several days.    Current Outpatient Prescriptions   Medication Sig Dispense Refill   • ondansetron (ZOFRAN) 4 MG Tab tablet Take 1 Tab by mouth every four hours as needed for Nausea/Vomiting. 20 Tab 1   • fluticasone (FLONASE) 50 MCG/ACT nasal spray Spray 1 Spray in nose every day. 16 g 5   • Omega-3 Fatty Acids (FISH OIL) 1000 MG Cap capsule Take 1,000 mg by mouth 3 times a day, with meals.     • mupirocin (BACTROBAN) 2 % Ointment Apply 1 Application to affected area(s) 2 times a day. 1 Tube 0   • Multiple Vitamins-Minerals (MULTI FOR HER 50+) Tab Take  by mouth.     • ranitidine (ZANTAC) 150 MG Tab Take 1 Tab by mouth 2 times a day. 60 Tab 11   • Multiple Vitamins-Minerals (CENTRUM SILVER PO) Take  by mouth.       No current facility-administered medications for this visit.     Social History   Substance Use Topics   • Smoking status: Former  "Smoker -- 0.50 packs/day for 5 years     Types: Cigarettes     Quit date: 03/01/1983   • Smokeless tobacco: Never Used   • Alcohol Use: 4.2 oz/week     7 Standard drinks or equivalent per week     I reviewed patients allergies, problem list and medications today in [facility].    ROS: Any/all pertinent positives listed in the HPI, otherwise all others reviewed are negative today.      /62 mmHg  Pulse 65  Temp(Src) 37 °C (98.6 °F)  Resp 18  Ht 1.6 m (5' 2.99\")  Wt 60.782 kg (134 lb)  BMI 23.74 kg/m2  SpO2 100%  LMP 07/27/2006  Breastfeeding? No    Exam:    Gen: Alert and oriented, No apparent distress. WDWN  Psych: A+Ox3, normal affect and mood  Skin: Warm, dry and intact. Good turgor   No rashes in visible areas.  Eye: Conjunctiva clear, lids normal  ENMT: Lips without lesions, good dentition   Oropharynx clear. Slight increased pressure on the left ear however otherwise no unremarkable bilateral TMs. Mild erythema bilateral nasal turbinates.  Lungs: Clear to auscultation bilaterally, no rales or rhonchi   Unlabored respiratory effort.   CV: Regular rate and rhythm, S1, S2. No murmurs.   No Edema  Neuro:    CNs: II:PERRLA, III/IV/VI EOM without ptosis or nystagmus, V motor intact, VII facial movements without asymmetry or weakness, iX/X palate midline, XI Neck strength intact, XII tongue protuded midline.  Motor: Strength noted 5/5 upper and lower bilateral extremities with normal tone.  No noted atrophy or deformity of motion.  Coordination: No dysmetria with rapid movements, \"finger-to-nose-to-finger\" with ease  Gait: Normal gait  Cerebellar signs: Absent  Tremors : Absent  Normal Romberg        Assessment and Plan.   57 y.o. female with the following issues.    1. Intermittent vertigo  Stable. Currently asymptomatic. Would like her to restart Flonase. She can do Vaseline and a nasal spray to improve the health of her nasal tissues as prevention. Flonase prescription written in case she needs refills. " Zofran as needed for nausea with any recurrent vertigo. She was given some home exercises she can do to decrease BPPV.  - ondansetron (ZOFRAN) 4 MG Tab tablet; Take 1 Tab by mouth every four hours as needed for Nausea/Vomiting.  Dispense: 20 Tab; Refill: 1  - fluticasone (FLONASE) 50 MCG/ACT nasal spray; Spray 1 Spray in nose every day.  Dispense: 16 g; Refill: 5

## 2018-03-13 ENCOUNTER — NON-PROVIDER VISIT (OUTPATIENT)
Dept: URGENT CARE | Facility: PHYSICIAN GROUP | Age: 58
End: 2018-03-13

## 2018-03-13 DIAGNOSIS — Z11.1 ENCOUNTER FOR PPD SKIN TEST READING: ICD-10-CM

## 2018-03-15 ENCOUNTER — NON-PROVIDER VISIT (OUTPATIENT)
Dept: URGENT CARE | Facility: PHYSICIAN GROUP | Age: 58
End: 2018-03-15

## 2018-03-15 NOTE — PROGRESS NOTES
Adelia Durant is a 57 y.o. female here for a non-provider visit for PPD reading -- Step 1 of 1.      1.  Resulted in Epic under enter/edit results? Yes   2.  TB evaluation questionnaire scanned into chart and original given to patient?Yes      3. Was induration greater than 0 mm? No.        Routed to PCP? No

## 2018-04-18 PROCEDURE — 86580 TB INTRADERMAL TEST: CPT | Performed by: PHYSICIAN ASSISTANT

## 2018-05-15 ENCOUNTER — OFFICE VISIT (OUTPATIENT)
Dept: MEDICAL GROUP | Facility: CLINIC | Age: 58
End: 2018-05-15
Payer: COMMERCIAL

## 2018-05-15 VITALS
SYSTOLIC BLOOD PRESSURE: 120 MMHG | WEIGHT: 129 LBS | HEART RATE: 73 BPM | BODY MASS INDEX: 22.86 KG/M2 | TEMPERATURE: 98.1 F | RESPIRATION RATE: 16 BRPM | DIASTOLIC BLOOD PRESSURE: 60 MMHG | HEIGHT: 63 IN | OXYGEN SATURATION: 98 %

## 2018-05-15 DIAGNOSIS — Z00.00 LABORATORY EXAM ORDERED AS PART OF ROUTINE GENERAL MEDICAL EXAMINATION: ICD-10-CM

## 2018-05-15 DIAGNOSIS — R42 INTERMITTENT VERTIGO: ICD-10-CM

## 2018-05-15 DIAGNOSIS — Z12.39 SCREENING BREAST EXAMINATION: ICD-10-CM

## 2018-05-15 DIAGNOSIS — Z00.00 ROUTINE GENERAL MEDICAL EXAMINATION AT A HEALTH CARE FACILITY: ICD-10-CM

## 2018-05-15 DIAGNOSIS — Z12.11 COLON CANCER SCREENING: ICD-10-CM

## 2018-05-15 DIAGNOSIS — E55.9 VITAMIN D DEFICIENCY: ICD-10-CM

## 2018-05-15 PROCEDURE — 99396 PREV VISIT EST AGE 40-64: CPT | Performed by: FAMILY MEDICINE

## 2018-05-15 RX ORDER — CHOLECALCIFEROL (VITAMIN D3) 25 MCG
1 CAPSULE ORAL DAILY
Qty: 30 CAP | Refills: 11 | COMMUNITY
Start: 2018-05-15

## 2018-05-15 RX ORDER — ONDANSETRON 4 MG/1
4 TABLET, FILM COATED ORAL EVERY 4 HOURS PRN
Qty: 20 TAB | Refills: 1 | Status: SHIPPED | OUTPATIENT
Start: 2018-05-15 | End: 2020-11-23

## 2018-05-15 ASSESSMENT — ENCOUNTER SYMPTOMS
BRUISES/BLEEDS EASILY: 0
WHEEZING: 0
MEMORY LOSS: 0
DIZZINESS: 1
MYALGIAS: 0
SHORTNESS OF BREATH: 0
SORE THROAT: 0
HEADACHES: 0
NERVOUS/ANXIOUS: 0
DOUBLE VISION: 0
FEVER: 0
DIARRHEA: 0
ORTHOPNEA: 0
DEPRESSION: 0
BLURRED VISION: 0
VOMITING: 0
SPUTUM PRODUCTION: 0
COUGH: 0
LOSS OF CONSCIOUSNESS: 0
TINGLING: 0
NAUSEA: 0
BLOOD IN STOOL: 0
CHILLS: 0
SENSORY CHANGE: 0
HEARTBURN: 0
SEIZURES: 0
PALPITATIONS: 0
WEIGHT LOSS: 0
BACK PAIN: 1
TREMORS: 0
SPEECH CHANGE: 0
NECK PAIN: 0
ABDOMINAL PAIN: 0
HALLUCINATIONS: 0
INSOMNIA: 0
FOCAL WEAKNESS: 0

## 2018-05-15 ASSESSMENT — PATIENT HEALTH QUESTIONNAIRE - PHQ9: CLINICAL INTERPRETATION OF PHQ2 SCORE: 0

## 2018-05-15 ASSESSMENT — LIFESTYLE VARIABLES: SUBSTANCE_ABUSE: 0

## 2018-05-16 NOTE — PROGRESS NOTES
Subjective:      Adelia Durant is a 57 y.o. female who presents with Annual Exam        She is here for her General Medical examination.    HPI   has a past medical history of Carcinoma in situ of cervix (1980); Cataracts, bilateral; Depression; Family history of breast cancer in mother; Family history of colon cancer; SAL (generalized anxiety disorder) (4/4/2017); GERD (gastroesophageal reflux disease); Joint stiffness of hand; Moderate episode of recurrent major depressive disorder (HCC) (11/30/2016); and Multiple gastric polyps (2008).   has a past surgical history that includes dilation and curettage cone biopsy (1979); colonoscopy (2008, 4/2013); and cataract extraction with iol.  family history includes Alcohol abuse in her brother, father, and sister; Bipolar disorder in her father; Cancer in her maternal grandmother; Cancer (age of onset: 66) in her father and mother; Drug abuse in her brother; GI in her mother; Heart Disease in her mother; Heart Disease (age of onset: 42) in her maternal uncle; Hypertension in her mother; Stroke in her maternal grandmother and paternal grandmother; Suicide Attempts in her child.   reports that she quit smoking about 35 years ago. Her smoking use included Cigarettes. She has a 2.50 pack-year smoking history. She has never used smokeless tobacco. She reports that she drinks about 3.0 oz of alcohol per week . She reports that she does not use drugs.      Current Outpatient Prescriptions:   •  Cholecalciferol (VITAMIN D-3) 1000 units Cap, Take 1 Cap by mouth every day., Disp: 30 Cap, Rfl: 11  •  Omega-3 Fatty Acids (FISH OIL) 1000 MG Cap capsule, Take 1,000 mg by mouth 3 times a day, with meals., Disp: , Rfl:   •  Multiple Vitamins-Minerals (MULTI FOR HER 50+) Tab, Take  by mouth., Disp: , Rfl:   •  ranitidine (ZANTAC) 150 MG Tab, Take 1 Tab by mouth 2 times a day., Disp: 60 Tab, Rfl: 11  •  Multiple Vitamins-Minerals (CENTRUM SILVER PO), Take  by mouth., Disp: , Rfl:   •   "ondansetron (ZOFRAN) 4 MG Tab tablet, Take 1 Tab by mouth every four hours as needed for Nausea/Vomiting., Disp: 20 Tab, Rfl: 1  •  mupirocin (BACTROBAN) 2 % Ointment, Apply 1 Application to affected area(s) 2 times a day., Disp: 1 Tube, Rfl: 0  is allergic to pcn [penicillins] and tetanus toxoid.    Review of Systems   Constitutional: Negative for chills, fever, malaise/fatigue and weight loss.   HENT: Positive for ear pain and hearing loss. Negative for congestion and sore throat.    Eyes: Negative for blurred vision and double vision.   Respiratory: Negative for cough, sputum production, shortness of breath and wheezing.    Cardiovascular: Negative for chest pain, palpitations, orthopnea and leg swelling.   Gastrointestinal: Negative for abdominal pain, blood in stool, diarrhea, heartburn, nausea and vomiting.   Genitourinary: Negative for dysuria, frequency, hematuria and urgency.   Musculoskeletal: Positive for back pain. Negative for joint pain, myalgias and neck pain.        Intermittent classic right sided sciatica   Skin: Negative for rash.   Neurological: Positive for dizziness. Negative for tingling, tremors, sensory change, speech change, focal weakness, seizures, loss of consciousness and headaches.   Endo/Heme/Allergies: Positive for environmental allergies. Does not bruise/bleed easily.   Psychiatric/Behavioral: Negative for depression, hallucinations, memory loss, substance abuse and suicidal ideas. The patient is not nervous/anxious and does not have insomnia.           Objective:     /60   Pulse 73   Temp 36.7 °C (98.1 °F)   Resp 16   Ht 1.6 m (5' 2.99\")   Wt 58.5 kg (129 lb)   LMP 07/27/2006   SpO2 98%   BMI 22.86 kg/m²      Physical Exam   Constitutional: She is oriented to person, place, and time. She appears well-developed and well-nourished.   HENT:   Head: Normocephalic and atraumatic.   Mouth/Throat: Oropharynx is clear and moist.   Eyes: EOM are normal. Pupils are equal, " round, and reactive to light. Left eye exhibits no discharge.   Neck: Normal range of motion. Neck supple. No JVD present. No thyromegaly present.   Cardiovascular: Normal rate, regular rhythm and normal heart sounds.    No murmur heard.  Pulmonary/Chest: Effort normal and breath sounds normal. No respiratory distress. She has no wheezes. She has no rales.   Abdominal: Soft. Bowel sounds are normal. She exhibits no distension and no mass. There is no tenderness.   Musculoskeletal: Normal range of motion. She exhibits no edema.   Lymphadenopathy:     She has no cervical adenopathy.   Neurological: She is alert and oriented to person, place, and time. Coordination normal.   Skin: Skin is warm and dry. No rash noted. No erythema.   Psychiatric: She has a normal mood and affect. Her behavior is normal.   Vitals reviewed.              Assessment/Plan:     1. Routine general medical examination at a health care facility  She is generally well.    2. Laboratory exam ordered as part of routine general medical examination  Routine orders discussed and placed  - COMP METABOLIC PANEL; Future  - LIPID PROFILE; Future  - CBC WITHOUT DIFFERENTIAL; Future    3. Vitamin D deficiency  Routine recheck order discussed and placed.  She is taking this daily.  - VITAMIN D,25 HYDROXY; Future

## 2018-06-07 ENCOUNTER — HOSPITAL ENCOUNTER (OUTPATIENT)
Dept: LAB | Facility: MEDICAL CENTER | Age: 58
End: 2018-06-07
Attending: FAMILY MEDICINE
Payer: COMMERCIAL

## 2018-06-07 DIAGNOSIS — Z00.00 LABORATORY EXAM ORDERED AS PART OF ROUTINE GENERAL MEDICAL EXAMINATION: ICD-10-CM

## 2018-06-07 DIAGNOSIS — E55.9 VITAMIN D DEFICIENCY: ICD-10-CM

## 2018-06-07 LAB
25(OH)D3 SERPL-MCNC: 30 NG/ML (ref 30–100)
ALBUMIN SERPL BCP-MCNC: 4.4 G/DL (ref 3.2–4.9)
ALBUMIN/GLOB SERPL: 1.5 G/DL
ALP SERPL-CCNC: 55 U/L (ref 30–99)
ALT SERPL-CCNC: 17 U/L (ref 2–50)
ANION GAP SERPL CALC-SCNC: 8 MMOL/L (ref 0–11.9)
AST SERPL-CCNC: 22 U/L (ref 12–45)
BILIRUB SERPL-MCNC: 0.7 MG/DL (ref 0.1–1.5)
BUN SERPL-MCNC: 13 MG/DL (ref 8–22)
CALCIUM SERPL-MCNC: 9.7 MG/DL (ref 8.5–10.5)
CHLORIDE SERPL-SCNC: 105 MMOL/L (ref 96–112)
CHOLEST SERPL-MCNC: 206 MG/DL (ref 100–199)
CO2 SERPL-SCNC: 27 MMOL/L (ref 20–33)
CREAT SERPL-MCNC: 0.79 MG/DL (ref 0.5–1.4)
ERYTHROCYTE [DISTWIDTH] IN BLOOD BY AUTOMATED COUNT: 47.5 FL (ref 35.9–50)
GLOBULIN SER CALC-MCNC: 2.9 G/DL (ref 1.9–3.5)
GLUCOSE SERPL-MCNC: 84 MG/DL (ref 65–99)
HCT VFR BLD AUTO: 42.9 % (ref 37–47)
HDLC SERPL-MCNC: 98 MG/DL
HGB BLD-MCNC: 14.2 G/DL (ref 12–16)
LDLC SERPL CALC-MCNC: 93 MG/DL
MCH RBC QN AUTO: 32.6 PG (ref 27–33)
MCHC RBC AUTO-ENTMCNC: 33.1 G/DL (ref 33.6–35)
MCV RBC AUTO: 98.6 FL (ref 81.4–97.8)
PLATELET # BLD AUTO: 289 K/UL (ref 164–446)
PMV BLD AUTO: 10.2 FL (ref 9–12.9)
POTASSIUM SERPL-SCNC: 4.2 MMOL/L (ref 3.6–5.5)
PROT SERPL-MCNC: 7.3 G/DL (ref 6–8.2)
RBC # BLD AUTO: 4.35 M/UL (ref 4.2–5.4)
SODIUM SERPL-SCNC: 140 MMOL/L (ref 135–145)
TRIGL SERPL-MCNC: 76 MG/DL (ref 0–149)
WBC # BLD AUTO: 4.4 K/UL (ref 4.8–10.8)

## 2018-06-07 PROCEDURE — 80061 LIPID PANEL: CPT

## 2018-06-07 PROCEDURE — 36415 COLL VENOUS BLD VENIPUNCTURE: CPT

## 2018-06-07 PROCEDURE — 85027 COMPLETE CBC AUTOMATED: CPT

## 2018-06-07 PROCEDURE — 82306 VITAMIN D 25 HYDROXY: CPT

## 2018-06-07 PROCEDURE — 80053 COMPREHEN METABOLIC PANEL: CPT

## 2018-06-13 ENCOUNTER — HOSPITAL ENCOUNTER (OUTPATIENT)
Dept: RADIOLOGY | Facility: MEDICAL CENTER | Age: 58
End: 2018-06-13
Attending: FAMILY MEDICINE
Payer: COMMERCIAL

## 2018-06-13 DIAGNOSIS — Z12.39 SCREENING BREAST EXAMINATION: ICD-10-CM

## 2018-06-13 PROCEDURE — 77067 SCR MAMMO BI INCL CAD: CPT

## 2019-08-21 ENCOUNTER — APPOINTMENT (OUTPATIENT)
Dept: RADIOLOGY | Facility: MEDICAL CENTER | Age: 59
End: 2019-08-21
Attending: PHYSICIAN ASSISTANT
Payer: COMMERCIAL

## 2019-08-21 DIAGNOSIS — M54.50 LUMBAR PAIN: ICD-10-CM

## 2019-08-21 DIAGNOSIS — M54.16 LUMBAR RADICULOPATHY: ICD-10-CM

## 2019-08-21 PROCEDURE — 72148 MRI LUMBAR SPINE W/O DYE: CPT

## 2019-12-20 ENCOUNTER — OFFICE VISIT (OUTPATIENT)
Dept: MEDICAL GROUP | Facility: PHYSICIAN GROUP | Age: 59
End: 2019-12-20
Payer: COMMERCIAL

## 2019-12-20 VITALS
TEMPERATURE: 98 F | DIASTOLIC BLOOD PRESSURE: 76 MMHG | OXYGEN SATURATION: 98 % | SYSTOLIC BLOOD PRESSURE: 138 MMHG | HEIGHT: 63 IN | BODY MASS INDEX: 21.79 KG/M2 | WEIGHT: 123 LBS | HEART RATE: 74 BPM

## 2019-12-20 DIAGNOSIS — Z12.31 ENCOUNTER FOR SCREENING MAMMOGRAM FOR BREAST CANCER: ICD-10-CM

## 2019-12-20 DIAGNOSIS — Z13.29 SCREENING FOR THYROID DISORDER: ICD-10-CM

## 2019-12-20 DIAGNOSIS — Z13.6 SCREENING FOR CARDIOVASCULAR CONDITION: ICD-10-CM

## 2019-12-20 DIAGNOSIS — M54.41 CHRONIC RIGHT-SIDED LOW BACK PAIN WITH RIGHT-SIDED SCIATICA: ICD-10-CM

## 2019-12-20 DIAGNOSIS — R09.89 LABILE BLOOD PRESSURE: ICD-10-CM

## 2019-12-20 DIAGNOSIS — G89.29 CHRONIC RIGHT-SIDED LOW BACK PAIN WITH RIGHT-SIDED SCIATICA: ICD-10-CM

## 2019-12-20 DIAGNOSIS — K21.9 GASTROESOPHAGEAL REFLUX DISEASE WITHOUT ESOPHAGITIS: ICD-10-CM

## 2019-12-20 DIAGNOSIS — Z13.21 ENCOUNTER FOR VITAMIN DEFICIENCY SCREENING: ICD-10-CM

## 2019-12-20 DIAGNOSIS — Z11.59 NEED FOR HEPATITIS C SCREENING TEST: ICD-10-CM

## 2019-12-20 DIAGNOSIS — F43.21 SITUATIONAL DEPRESSION: ICD-10-CM

## 2019-12-20 PROBLEM — F41.9 ANXIETY: Status: RESOLVED | Noted: 2017-02-28 | Resolved: 2019-12-20

## 2019-12-20 PROCEDURE — 99215 OFFICE O/P EST HI 40 MIN: CPT | Performed by: NURSE PRACTITIONER

## 2019-12-20 RX ORDER — METHOCARBAMOL 500 MG/1
TABLET, FILM COATED ORAL
COMMUNITY
Start: 2019-12-09 | End: 2021-01-19 | Stop reason: SDUPTHER

## 2019-12-20 RX ORDER — DIPHENHYDRAMINE HCL 25 MG
25 TABLET ORAL EVERY 6 HOURS PRN
COMMUNITY
End: 2021-08-19

## 2019-12-20 SDOH — HEALTH STABILITY: MENTAL HEALTH: HOW OFTEN DO YOU HAVE A DRINK CONTAINING ALCOHOL?: MONTHLY OR LESS

## 2019-12-20 ASSESSMENT — PATIENT HEALTH QUESTIONNAIRE - PHQ9
9. THOUGHTS THAT YOU WOULD BE BETTER OFF DEAD, OR OF HURTING YOURSELF: NOT AT ALL
4. FEELING TIRED OR HAVING LITTLE ENERGY: SEVERAL DAYS
6. FEELING BAD ABOUT YOURSELF - OR THAT YOU ARE A FAILURE OR HAVE LET YOURSELF OR YOUR FAMILY DOWN: SEVERAL DAYS
1. LITTLE INTEREST OR PLEASURE IN DOING THINGS: SEVERAL DAYS
2. FEELING DOWN, DEPRESSED, IRRITABLE, OR HOPELESS: MORE THAN HALF THE DAYS
SUM OF ALL RESPONSES TO PHQ9 QUESTIONS 1 AND 2: 3
SUM OF ALL RESPONSES TO PHQ QUESTIONS 1-9: 8
3. TROUBLE FALLING OR STAYING ASLEEP OR SLEEPING TOO MUCH: SEVERAL DAYS
5. POOR APPETITE OR OVEREATING: SEVERAL DAYS
8. MOVING OR SPEAKING SO SLOWLY THAT OTHER PEOPLE COULD HAVE NOTICED. OR THE OPPOSITE, BEING SO FIGETY OR RESTLESS THAT YOU HAVE BEEN MOVING AROUND A LOT MORE THAN USUAL: NOT AT ALL
7. TROUBLE CONCENTRATING ON THINGS, SUCH AS READING THE NEWSPAPER OR WATCHING TELEVISION: SEVERAL DAYS

## 2019-12-20 NOTE — PROGRESS NOTES
Chief Complaint   Patient presents with   • Blood Pressure Problem     BP has been running high        Patient was seen for a total of 50 minutes face-to-face by myself, with more than half of the time spent counseling on treatments, discussing risks and benefits of care, coordinating care.         Subjective:     Adelia Durant is a 59 y.o. female presenting to establish care.  She has been dealing with several acute and chronic issues recently that need to be addressed.    1. Chronic right-sided low back pain with right-sided sciatica  This is a new problem.  Patient has been dealing with acute pain in her low back radiating to the right leg for several months.  She has been under the care of [facility] at this time.  She is received several injections and epidurals.  She does report some relief with these, however is quite frustrated with the lack of communication.  She is unsure if this is going to be chronic, if she is a candidate for surgery, risks of surgery.  Denies changes in bowel or bladder issues.  Experiencing right foot tingling.    2. Gastroesophageal reflux disease without esophagitis  This is a worsening chronic problem.  Patient has had GERD for years.  Previously tested for H. pylori and negative.  Previously had EGD but was told no overt abnormalities seen.  Tried Prilosec previously but it gave her bad headaches.  Has been on ranitidine since, but she knows this is a now unknown carcinogen and she will likely have to stop.  Her orthopedist wants her on more anti-inflammatory medications, but due to stomach pain she is unable to tolerate them.    3. Situational depression  Patient is very frustrated during this time.  Feels way down and unable to cope with vast emotions and pain she is experiencing.  Her son was previously diagnosed and treated with multiple mental health disorders previously and she feels he was overmedicated at times.  Because of this she is very  hesitant to try medication.    4. Labile blood pressure  Has had very labile blood pressures over the past several months.  She has been checking it at home however reports that machine is roughly 20 years old and has not been calibrated.  Blood pressures have ranged from 116/74 to 161/99 prior to an epidural.  As well as 148/60.  She does not want a jump right medication, she is just unsure why her blood pressure actually is.  Denies chest pain, shortness of breath, headaches, flank pain, difficulty urinating, changes in her vision, swelling or color changes in her lower extremities.      5. Encounter for screening mammogram for breast cancer  Does have a family history of breast cancer, mother had breast cancer though late in her life.  Screening mammogram due    6. Need for hepatitis C screening test  Screening is due for lifetime testing    7. Encounter for vitamin deficiency screening  Previously found to be vitamin D deficient, takes daily supplements when she remembers.  Has not been retested in a while    8. Screening for cardiovascular condition  Due for routine labs, asymptomatic at this time.    9. Screening for thyroid disorder  Screening is appropriate due to labile blood pressure and sense of jitteriness during sleep.  She is unsure if the symptoms are just related to the pain or something else.      Review of systems:  See above.     Denies chest pain, shortness of breath, dizziness, severe headache, altered cognition, changes in bowel or bladder habits.    Current Outpatient Medications:   •  methocarbamol (ROBAXIN) 500 MG Tab, , Disp: , Rfl:   •  Naproxen Sodium (ALEVE PO), Take  by mouth., Disp: , Rfl:   •  Acetaminophen (TYLENOL PO), Take  by mouth., Disp: , Rfl:   •  diphenhydrAMINE (BENADRYL) 25 MG Tab, Take 25 mg by mouth every 6 hours as needed for Sleep., Disp: , Rfl:   •  ondansetron (ZOFRAN) 4 MG Tab tablet, Take 1 Tab by mouth every four hours as needed for Nausea/Vomiting., Disp: 20 Tab,  "Rfl: 1  •  ranitidine (ZANTAC) 150 MG Tab, Take 1 Tab by mouth 2 times a day., Disp: 60 Tab, Rfl: 11  •  Multiple Vitamins-Minerals (CENTRUM SILVER PO), Take  by mouth., Disp: , Rfl:   •  Cholecalciferol (VITAMIN D-3) 1000 units Cap, Take 1 Cap by mouth every day., Disp: 30 Cap, Rfl: 11  •  Omega-3 Fatty Acids (FISH OIL) 1000 MG Cap capsule, Take 1,000 mg by mouth 3 times a day, with meals., Disp: , Rfl:     Allergies, past medical history, past surgical history, family history, social history reviewed and updated    Objective:     Vitals: /76 (BP Location: Left arm, Patient Position: Sitting, BP Cuff Size: Adult)   Pulse 74   Temp 36.7 °C (98 °F) (Temporal)   Ht 1.6 m (5' 3\")   Wt 55.8 kg (123 lb)   LMP 07/27/2006   SpO2 98%   BMI 21.79 kg/m²   General: Alert, cooperative, dressed appropriately for weather / situation  HEENT: Normocephalic.  EOMI, no icterus or pallor.  Conjunctivae clear without erythema / irritation. External ears developed;   Neck supple, absent of cervical or supraclavicular lymphadenopathy.  Thyroid palpated, free of masses or goiter.   Heart: Regular rate and rhythm.  S1 and S2 normal.  No murmurs auscultated; no murmurs / bruits heard over bilateral carotids.  Bilateral radial pulses strong and equal.  Bilateral posterior tibial pulses strong and equal.  Respiratory: Normal respiratory effort.  Clear to auscultation bilaterally.  AP ratio 1:2   Abdomen: Non-distended;  Skin: Visible skin intact, dry, without rash.  Musculoskeletal: Gait is mildly ataxic, less mobility on right leg.  Patient unable to sit down during exam due to low back pain.  Bilateral  strength strong equal.    Neuro:  AAOx3 Visual tracking intact, no nystagmus;   Psych: Tearfulness observed when discussing pain and frustrations with plan of care; memory is intact, grooming is appropriate.    Assessment/Plan:     Adelia was seen today for blood pressure problem.    Diagnoses and all orders for this " visit:    Chronic right-sided low back pain with right-sided sciatica:    Patient is interested in getting an additional opinion on her current condition.  Referral to physiatry will be placed.  We discussed the role physiatry may have in her care plan including further evaluation, and potential treatments with epidurals injections and recommendations.  She has many family members who have had spine surgery and up poorly in the past and I think having an additional opinion on board would help provide her some comfort.    We discussed the potential options of using antidepressants or gabapentin to aid in symptom relief.  She is going to think about this and let me know.  She feels that she is gradually getting better so she does not want to pursue this at this time.    -     REFERRAL TO PHYSIATRY (PMR)    Gastroesophageal reflux disease without esophagitis:    As patient failed prior PPI therapy and is unable to continue current ranitidine regimen, additional insight from gastroenterology would be helpful.  We discussed the potential use of Carafate, she is unsure if she use this previously.  She just remembers of prior doctor put her on a 4 times a day regimen of something that she also did not tolerate.  She was finishing menopause at the time she did try Prilosec, so she says the headaches may have been associated with that.  -     REFERRAL TO GASTROENTEROLOGY    Situational depression:    Discussed the benefit of additional self-care, possible medication, and possible counseling during this period of acute pain and frustration.  Patient aware of treatment options, does not feel she needs them at this time.    Labile blood pressure:    Advised patient to either bring in blood pressure machine to get it calibrated in our clinic, or purchase a new blood pressure machine I would like her to keep a record of at least 2 weeks worth of blood pressures, twice a day.  Once in the morning when she is relaxed and another  after she has been up and active in the day.  She can write this down or keep it in her phone, which ever works for her.  We will review this at her follow-up appointment and determine if there is true hypertension going on.      Encounter for screening mammogram for breast cancer  -     MA-SCREENING MAMMO BILAT W/TOMOSYNTHESIS W/CAD; Future    Need for hepatitis C screening test  -     HEP C VIRUS ANTIBODY; Future    Encounter for vitamin deficiency screening  -     CBC WITH DIFFERENTIAL; Future  -     Comp Metabolic Panel; Future  -     VITAMIN D,25 HYDROXY; Future    Screening for cardiovascular condition  -     Lipid Profile; Future    Screening for thyroid disorder  -     TSH WITH REFLEX TO FT4; Future          Return in about 4 weeks (around 1/17/2020).    Patient verbalized understanding and agreed to plan of care.  Encouraged to contact me with needs via [facility] [facility] or by phone if needed.        Please note that this dictation was created using voice recognition software. I have made every reasonable attempt to correct obvious errors, but I expect that there are errors of grammar and possibly content that I did not discover before finalizing the note.

## 2020-01-23 ENCOUNTER — APPOINTMENT (OUTPATIENT)
Dept: PHYSICAL MEDICINE AND REHAB | Facility: MEDICAL CENTER | Age: 60
End: 2020-01-23
Payer: COMMERCIAL

## 2020-05-21 ENCOUNTER — HOSPITAL ENCOUNTER (OUTPATIENT)
Dept: RADIOLOGY | Facility: MEDICAL CENTER | Age: 60
End: 2020-05-21
Attending: NURSE PRACTITIONER
Payer: COMMERCIAL

## 2020-05-21 DIAGNOSIS — Z12.31 ENCOUNTER FOR SCREENING MAMMOGRAM FOR BREAST CANCER: ICD-10-CM

## 2020-05-21 PROCEDURE — 77067 SCR MAMMO BI INCL CAD: CPT

## 2020-06-05 ENCOUNTER — OFFICE VISIT (OUTPATIENT)
Dept: PHYSICAL MEDICINE AND REHAB | Facility: MEDICAL CENTER | Age: 60
End: 2020-06-05
Payer: COMMERCIAL

## 2020-06-05 VITALS
TEMPERATURE: 98 F | DIASTOLIC BLOOD PRESSURE: 80 MMHG | BODY MASS INDEX: 22.46 KG/M2 | HEIGHT: 63 IN | WEIGHT: 126.76 LBS | SYSTOLIC BLOOD PRESSURE: 122 MMHG | OXYGEN SATURATION: 98 % | HEART RATE: 73 BPM

## 2020-06-05 DIAGNOSIS — M54.17 LUMBOSACRAL RADICULOPATHY: ICD-10-CM

## 2020-06-05 DIAGNOSIS — M51.36 DDD (DEGENERATIVE DISC DISEASE), LUMBAR: ICD-10-CM

## 2020-06-05 DIAGNOSIS — M51.27 PROTRUSION OF INTERVERTEBRAL DISC OF LUMBOSACRAL REGION: ICD-10-CM

## 2020-06-05 DIAGNOSIS — K21.9 GASTROESOPHAGEAL REFLUX DISEASE WITHOUT ESOPHAGITIS: Chronic | ICD-10-CM

## 2020-06-05 DIAGNOSIS — Z79.899 MEDICATION MANAGEMENT: ICD-10-CM

## 2020-06-05 DIAGNOSIS — F43.21 SITUATIONAL DEPRESSION: ICD-10-CM

## 2020-06-05 PROCEDURE — 99205 OFFICE O/P NEW HI 60 MIN: CPT | Performed by: PHYSICAL MEDICINE & REHABILITATION

## 2020-06-05 RX ORDER — DULOXETIN HYDROCHLORIDE 30 MG/1
30 CAPSULE, DELAYED RELEASE ORAL DAILY
Qty: 7 CAP | Refills: 0 | Status: SHIPPED | OUTPATIENT
Start: 2020-06-05 | End: 2020-06-12

## 2020-06-05 RX ORDER — GABAPENTIN 100 MG/1
100 CAPSULE ORAL
COMMUNITY
End: 2020-11-19

## 2020-06-05 RX ORDER — DULOXETIN HYDROCHLORIDE 60 MG/1
60 CAPSULE, DELAYED RELEASE ORAL DAILY
Qty: 90 CAP | Refills: 0 | Status: SHIPPED | OUTPATIENT
Start: 2020-06-05 | End: 2020-07-17 | Stop reason: SDUPTHER

## 2020-06-05 ASSESSMENT — PATIENT HEALTH QUESTIONNAIRE - PHQ9
5. POOR APPETITE OR OVEREATING: 1 - SEVERAL DAYS
CLINICAL INTERPRETATION OF PHQ2 SCORE: 2
SUM OF ALL RESPONSES TO PHQ QUESTIONS 1-9: 9

## 2020-06-05 ASSESSMENT — PAIN SCALES - GENERAL: PAINLEVEL: 5=MODERATE PAIN

## 2020-06-05 ASSESSMENT — FIBROSIS 4 INDEX: FIB4 SCORE: 1.14

## 2020-06-05 NOTE — PROGRESS NOTES
New patient note    Physiatry (physical medicine and  Rehabilitation), interventional spine and sports medicine    Date of Service: 06/05/2020     Chief complaint:   Chief Complaint   Patient presents with   • New Patient     Low back pain       HISTORY    HPI: Adelia Durant 60 y.o. female who presents today for evaluation of low back and right leg pain.  She reports that this was excruciating pain in August of 2019.  She was not able to do anything by stand or lay down.    From what she reports, she started having low back pain about 10 years ago and was also doing physical work.  She had seen a chiropractor at that time.    Occasionally, she had episodes of pain in her back in summer of 2019 and sometimes would have significant back pain.  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    Currently, she reports that she is a lot better.  She can sit, tie her shoes.  Bending, as to get in the fridge is painful, but she can do this.  She is taking methocarbamol.  Her [place] doctor, Dr. Macedo put her on gabapentin.  She was seen at the [facility] [address] [place] [address] [phone].  She takes 500mg/day, 200mg in the morning, 100mg in midday and 200mg at bedtime.  It makes her tired.  Meloxicam irritates her stomach, it is a new medication.  Her stomach is burning.  She stopped it and her stomach feels better.    Sitting is still uncomfortable.  Bending backward and forward are both painful.  Sneezing seems to increase radicular pain.  Pain goes to the posterior thigh to the lateral ankle.  No weakness.  No bowel or bladder changes.  Numbness in her toe has resolved.    She had physical therapy in [place].  Pain is 1/10 on the NRS now, but was 9/10 at the start.    From our discussion, she is fearful of pain worsening.  Per the patient, Dr. Stone told her  "\"it's inflammation\"    She has been in [place] caring for her mother.     PHQ-9: 9, denies suicidal thoughts.  Feels a lot of stress and overwhelm with her mother in hospice      Medical records review:  I reviewed the note from the referring provider Elsie Pizarro A* dated 12/20/2019    Previous treatments:    Physical Therapy: Yes    Medications the patient is tried: NSAIDs, gabapentin and muscle relaxers    Previous interventions: injections at [facility], two epidurals    Previous surgeries to relieve the above pain:  none      ROS:   Eyes: dizziness  ENT: ringing in her ears  Psych: depression  Red Flags ROS:   Fever, Chills, Sweats: Denies  Involuntary Weight Loss: Denies  Bladder Incontinence: Denies  Bowel Incontinence: Denies  Saddle Anesthesia: Denies    All other systems reviewed and negative.       PMHx:   Past Medical History:   Diagnosis Date   • Carcinoma in situ of cervix 1980   • Cataracts, bilateral    • Depression    • Family history of breast cancer in mother    • Family history of colon cancer    • SAL (generalized anxiety disorder) 4/4/2017   • GERD (gastroesophageal reflux disease)     hiatal hernia, Dr. Ozuna   • Joint stiffness of hand    • Moderate episode of recurrent major depressive disorder (HCC) 11/30/2016   • Multiple gastric polyps 2008       PSHx:   Past Surgical History:   Procedure Laterality Date   • DILATION AND CURETTAGE CONE BIOPSY  1979    abnormal pap, carcinoma in situ   • CATARACT EXTRACTION WITH IOL      Dr. Finn   • COLONOSCOPY  2008, 4/2013    normal except for diverticulosis, Dr. Ozuna       Family history   Family History   Problem Relation Age of Onset   • Cancer Father 66        colon   • Alcohol abuse Father    • Bipolar disorder Father    • Cancer Mother 66        breast, lumpectomy   • Hypertension Mother    • Heart Disease Mother         enlarged heart   • GI Disease Mother         perforated diverticulitis, colostomy   • Stroke Maternal Grandmother    • " Cancer Maternal Grandmother         ovarian and gastric   • Stroke Paternal Grandmother    • Heart Disease Maternal Uncle 42        heart demise   • Alcohol abuse Sister    • Alcohol abuse Brother    • Drug abuse Brother    • Suicide Attempts Child          Medications:   Current Outpatient Medications   Medication   • Cimetidine (TAGAMET PO)   • Loratadine (CLARITIN PO)   • gabapentin (NEURONTIN) 100 MG Cap   • DULoxetine (CYMBALTA) 30 MG Cap DR Particles   • DULoxetine (CYMBALTA) 60 MG Cap DR Particles delayed-release capsule   • methocarbamol (ROBAXIN) 500 MG Tab   • Naproxen Sodium (ALEVE PO)   • Acetaminophen (TYLENOL PO)   • Cholecalciferol (VITAMIN D-3) 1000 units Cap   • Omega-3 Fatty Acids (FISH OIL) 1000 MG Cap capsule   • Multiple Vitamins-Minerals (CENTRUM SILVER PO)   • diphenhydrAMINE (BENADRYL) 25 MG Tab   • ondansetron (ZOFRAN) 4 MG Tab tablet   • ranitidine (ZANTAC) 150 MG Tab     No current facility-administered medications for this visit.        Allergies:   Allergies   Allergen Reactions   • Pcn [Penicillins]    • Tetanus Toxoid Hives and Rash     Body aches       Social Hx:   Social History     Socioeconomic History   • Marital status:      Spouse name: Not on file   • Number of children: Not on file   • Years of education: Not on file   • Highest education level: Not on file   Occupational History   • Not on file   Social Needs   • Financial resource strain: Not on file   • Food insecurity     Worry: Not on file     Inability: Not on file   • Transportation needs     Medical: Not on file     Non-medical: Not on file   Tobacco Use   • Smoking status: Former Smoker     Packs/day: 0.50     Years: 5.00     Pack years: 2.50     Types: Cigarettes     Last attempt to quit: 3/1/1983     Years since quittin.2   • Smokeless tobacco: Never Used   Substance and Sexual Activity   • Alcohol use: Yes     Alcohol/week: 3.0 oz     Types: 5 Standard drinks or equivalent per week     Frequency:  "Monthly or less   • Drug use: No   • Sexual activity: Yes     Partners: Male     Comment: Occassionally   Lifestyle   • Physical activity     Days per week: Not on file     Minutes per session: Not on file   • Stress: Not on file   Relationships   • Social connections     Talks on phone: Not on file     Gets together: Not on file     Attends [employer]ism service: Not on file     Active member of club or organization: Not on file     Attends meetings of clubs or organizations: Not on file     Relationship status: Not on file   • Intimate partner violence     Fear of current or ex partner: Not on file     Emotionally abused: Not on file     Physically abused: Not on file     Forced sexual activity: Not on file   Other Topics Concern   •  Service No   • Blood Transfusions No   • Caffeine Concern No   • Occupational Exposure No   • Hobby Hazards No   • Sleep Concern Yes   • Stress Concern Yes   • Weight Concern No   • Special Diet No   • Back Care Yes   • Exercise Yes   • Bike Helmet No   • Seat Belt Yes   • Self-Exams No   Social History Narrative   • Not on file         EXAMINATION     Physical Exam:   Vitals: /80 (BP Location: Right arm, Patient Position: Sitting, BP Cuff Size: Small adult)   Pulse 73   Temp 36.7 °C (98 °F) (Temporal)   Ht 1.6 m (5' 3\")   Wt 57.5 kg (126 lb 12.2 oz)   SpO2 98%     Constitutional:   Body Habitus: Body mass index is 22.46 kg/m².  Cooperation: Fully cooperates with exam  Appearance: Well-groomed, well-nourished, not disheveled, in no acute distress    Eyes: No scleral icterus, no proptosis     ENT -no obvious auditory deficits, wearing a mask    Skin -no rashes or lesions noted     Respiratory-  breathing comfortable on room air, no audible wheezing    Cardiovascular- capillary refills less than 2 seconds. No lower extremity edema is noted.     Gastrointestinal - no obvious abdominal masses, No tenderness to palpation in the abdomen    Psychiatric- alert and oriented ×3. " Normal affect.     Gait - normal gait, no use of ambulatory device, nonantalgic. The patient can toe walk with ease. The patient can heel walk with ease. She is able to perform 10 single leg toe raises bilaterally      Musculoskeletal -   Cervical spine   Inspection: No deformities of the skin over the cervical spine. No rashes or lesions.    full  A/P ROM in all directions, without  pain      Spurling’s sign: negative bilaterally    No signs of muscular atrophy in bilateral upper extremities     No tenderness to palpation of the cervical facets    Thoracic/Lumbar Spine/Sacral Spine/Hips   Inspection: No evidence of atrophy in bilateral lower extremities throughout     ROM: decreased AROM with flexion, extension, lateral flexion, and rotation bilaterally, with pain at end range.  There is note of prominence of thoracic paraspinals and mild scoliosis    Palpation:   No tenderness to palpation in midline at T1-T12 levels. No tenderness to palpation in the left and right of the midline T1-L5  palpation over SI joint: negative bilaterally    palpation over buttock: negative bilaterally    palpation in hip or over the greater trochanters: negative bilaterally      Lumbar spine Special tests  Neuro tension  Straight leg test positive right, negative left        HIP  Range of motion in the hips is within normal limits in internal and external ROM bilaterally    SI joint tests  Observation patient sits on one buttocks: Negative   KENYETTA test negative bilaterally       Neuro       Key points for the international standards for neurological classification of spinal cord injury (ISNCSCI) to light touch.     Dermatome R L   C4 2 2   C5 2 2   C6 2 2   C7 2 2   C8 2 2   T1 2 2   T2 2 2   L2 2 2   L3 2 2   L4 2 2   L5 2 2   S1 2 2   S2 2 2         Motor Exam Upper Extremities   ? Myotome R L   Shoulder flexion C5 5 5   Elbow flexion C5 5 5   Wrist extension C6 5 5   Elbow extension C7 5 5   Finger flexion C8 5 5   Finger abduction  T1 5 5         Motor Exam Lower Extremities    ? Myotome R L   Hip flexion L2 5 5   Knee extension L3 5 5   Ankle dorsiflexion L4 5 5   Toe extension L5 4 5   Ankle plantarflexion S1 5 5       Moy’s sign negative bilaterally   Clonus of the ankle negative bilaterally     Reflexes  ?  R L   Biceps  2+ 2+   Brachioradialis  2+ 2+   Patella  2+ 2+   Achilles   2+ 2+       MEDICAL DECISION MAKING    Medical records review: see under HPI section.     DATA    Labs:   Lab Results   Component Value Date/Time    SODIUM 140 06/07/2018 06:26 AM    POTASSIUM 4.2 06/07/2018 06:26 AM    CHLORIDE 105 06/07/2018 06:26 AM    CO2 27 06/07/2018 06:26 AM    ANION 8.0 06/07/2018 06:26 AM    GLUCOSE 84 06/07/2018 06:26 AM    BUN 13 06/07/2018 06:26 AM    CREATININE 0.79 06/07/2018 06:26 AM    CREATININE 0.76 03/26/2010 07:29 AM    CALCIUM 9.7 06/07/2018 06:26 AM    ASTSGOT 22 06/07/2018 06:26 AM    ALTSGPT 17 06/07/2018 06:26 AM    TBILIRUBIN 0.7 06/07/2018 06:26 AM    ALBUMIN 4.4 06/07/2018 06:26 AM    TOTPROTEIN 7.3 06/07/2018 06:26 AM    GLOBULIN 2.9 06/07/2018 06:26 AM    AGRATIO 1.5 06/07/2018 06:26 AM       No results found for: PROTHROMBTM, INR     Lab Results   Component Value Date/Time    WBC 4.4 (L) 06/07/2018 06:26 AM    WBC 5.4 03/26/2010 07:29 AM    RBC 4.35 06/07/2018 06:26 AM    RBC 4.43 03/26/2010 07:29 AM    HEMOGLOBIN 14.2 06/07/2018 06:26 AM    HEMATOCRIT 42.9 06/07/2018 06:26 AM    MCV 98.6 (H) 06/07/2018 06:26 AM    MCV 96 03/26/2010 07:29 AM    MCH 32.6 06/07/2018 06:26 AM    MCH 31.8 03/26/2010 07:29 AM    MCHC 33.1 (L) 06/07/2018 06:26 AM    MPV 10.2 06/07/2018 06:26 AM    NEUTSPOLYS 56.50 09/15/2015 07:20 AM    LYMPHOCYTES 33.20 09/15/2015 07:20 AM    MONOCYTES 6.60 09/15/2015 07:20 AM    EOSINOPHILS 2.60 09/15/2015 07:20 AM    BASOPHILS 1.10 09/15/2015 07:20 AM        No results found for: HBA1C     Imaging: I personally reviewed following images, these are my reads  MRI lumbar spine 08/21/2019  At L5-S1,  there is a right paracentral disc protrusion that causes right lateral recess stenosis and appears to abut the right S1 nerve root.  There is no central or foraminal canal stenosis  At L4-5, mild disc dessication.  There is a diffuse disc bulge without central or foraminal narrowing  At L3-4, mild disc dessication.  No central canal stenosis, mild left foraminal narrowing with left bulge with foraminal disc protrusion  At L2-3, mild diffuse disc bulge, no central or foraminal stenosis  At L1-2, no central or foraminal stenosis    IMAGING radiology reads. I reviewed the following radiology reads                                 Results for orders placed in visit on 08/21/19   MR-LUMBAR SPINE-W/O    Impression 1.  There is right paracentral disc protrusion at L5-S1 causing effacement of the right lateral recess. The disc is abutting the exiting right S1 nerve root at the lateral recess.  2.  Broad-based right foraminal disc protrusion at L3-4 causing mild stenosis of right L3 neural foramina.  3.  Degenerative disease as described above.                      Results for orders placed during the hospital encounter of 05/07/08   DX-CERVICAL SPINE-2 OR 3 VIEWS    Impression IMPRESSION:     1. NORMAL CERVICAL SPINE.    2. QUESTION OF THORACIC SCOLIOSIS.      MPW/llw      Read By ELENO NEVAREZ MD on May  7 2008 11:43AM  : [facility] Transcription Date: May  8 2008  2:45PM  THIS DOCUMENT HAS BEEN ELECTRONICALLY SIGNED BY: ELENO NEVAREZ MD on May    8 2008  4:28PM                                                         Results for orders placed in visit on 09/16/14   DX-PELVIS-1 OR 2 VIEWS    Impression Mild bilateral hip osteoarthritis and femoral head morphology could predispose to cam type impingement                                       Diagnosis   Visit Diagnoses     ICD-10-CM   1. Lumbosacral radiculopathy  M54.17   2. Protrusion of intervertebral disc of lumbosacral region  M51.27   3. DDD (degenerative disc  disease), lumbar  M51.36   4. Medication management  Z79.899   5. Gastroesophageal reflux disease without esophagitis  K21.9   6. Situational depression  F43.21         ASSESSMENT:  Adelia Durant 60 y.o. female seen for above     Adelia was seen today for new patient.    Diagnoses and all orders for this visit:    Lumbosacral radiculopathy  -     DULoxetine (CYMBALTA) 30 MG Cap DR Particles; Take 1 Cap by mouth every day for 7 days.  -     DULoxetine (CYMBALTA) 60 MG Cap DR Particles delayed-release capsule; Take 1 Cap by mouth every day for 90 days.    Protrusion of intervertebral disc of lumbosacral region  -     DULoxetine (CYMBALTA) 30 MG Cap DR Particles; Take 1 Cap by mouth every day for 7 days.  -     DULoxetine (CYMBALTA) 60 MG Cap DR Particles delayed-release capsule; Take 1 Cap by mouth every day for 90 days.    DDD (degenerative disc disease), lumbar    Medication management  -     Comp Metabolic Panel; Future  -     CBC WITHOUT DIFFERENTIAL; Future    Gastroesophageal reflux disease without esophagitis  -     CBC WITHOUT DIFFERENTIAL; Future    Situational depression      1. Discussed findings on her MRI lumbar spine.  2. Discussed that she should stop her anti-inflammatories.  She reports side effects.    3. Ordered CBC and CMP.  4. Discussed weaning her methocarbamol.  Okay to discontinue.  5. Gabapentin discussed.  She is having some mild side effects.  Continue for now, okay to wean by 100mg per week, if she is doing well with duloxetine.  6. Trial duloxetine 30mg for a week and 60mg daily.  Discussed possible side effects.  7. Discussed possible repeat lumbar epidural steroid injection, right L5 and S1 TFESI, but she is leaving town soon to care for her mother and is hesitant right now.  Discussed possible surgical management, but again, she is caring for her ailing mother and this is on hold as a consideration  8. Continue home exercise program from PT as tolerated.  Advised against  significantly physical work.      Outside records requested:  The patient signed outside records request form for her outside records including outside images.      Follow-up: Return in about 6 weeks (around 7/17/2020).    Thank you very much for asking me to participate in Adelia Durant's care.  Please contact me with any questions or concerns.      Please note that this dictation was created using voice recognition software. I have made every reasonable attempt to correct obvious errors but there may be errors of grammar and content that I may have overlooked prior to finalization of this note.      Danny King MD  Physical Medicine and Rehabilitation  Interventional Spine and Sports Physiatry  [facility]           CC Elsie Pizarro, A

## 2020-06-06 ENCOUNTER — HOSPITAL ENCOUNTER (OUTPATIENT)
Dept: LAB | Facility: MEDICAL CENTER | Age: 60
End: 2020-06-06
Attending: NURSE PRACTITIONER
Payer: COMMERCIAL

## 2020-06-06 ENCOUNTER — HOSPITAL ENCOUNTER (OUTPATIENT)
Dept: LAB | Facility: MEDICAL CENTER | Age: 60
End: 2020-06-06
Attending: PHYSICAL MEDICINE & REHABILITATION
Payer: COMMERCIAL

## 2020-06-06 DIAGNOSIS — Z79.899 MEDICATION MANAGEMENT: ICD-10-CM

## 2020-06-06 DIAGNOSIS — Z13.6 SCREENING FOR CARDIOVASCULAR CONDITION: ICD-10-CM

## 2020-06-06 DIAGNOSIS — Z11.59 NEED FOR HEPATITIS C SCREENING TEST: ICD-10-CM

## 2020-06-06 DIAGNOSIS — Z13.21 ENCOUNTER FOR VITAMIN DEFICIENCY SCREENING: ICD-10-CM

## 2020-06-06 DIAGNOSIS — Z13.29 SCREENING FOR THYROID DISORDER: ICD-10-CM

## 2020-06-06 DIAGNOSIS — K21.9 GASTROESOPHAGEAL REFLUX DISEASE WITHOUT ESOPHAGITIS: Chronic | ICD-10-CM

## 2020-06-06 LAB
25(OH)D3 SERPL-MCNC: 44 NG/ML (ref 30–100)
ALBUMIN SERPL BCP-MCNC: 4.8 G/DL (ref 3.2–4.9)
ALBUMIN SERPL BCP-MCNC: 4.9 G/DL (ref 3.2–4.9)
ALBUMIN/GLOB SERPL: 1.8 G/DL
ALBUMIN/GLOB SERPL: 1.8 G/DL
ALP SERPL-CCNC: 67 U/L (ref 30–99)
ALP SERPL-CCNC: 68 U/L (ref 30–99)
ALT SERPL-CCNC: 16 U/L (ref 2–50)
ALT SERPL-CCNC: 18 U/L (ref 2–50)
ANION GAP SERPL CALC-SCNC: 12 MMOL/L (ref 7–16)
ANION GAP SERPL CALC-SCNC: 12 MMOL/L (ref 7–16)
AST SERPL-CCNC: 16 U/L (ref 12–45)
AST SERPL-CCNC: 16 U/L (ref 12–45)
BASOPHILS # BLD AUTO: 1.1 % (ref 0–1.8)
BASOPHILS # BLD: 0.04 K/UL (ref 0–0.12)
BILIRUB SERPL-MCNC: 0.5 MG/DL (ref 0.1–1.5)
BILIRUB SERPL-MCNC: 0.5 MG/DL (ref 0.1–1.5)
BUN SERPL-MCNC: 14 MG/DL (ref 8–22)
BUN SERPL-MCNC: 15 MG/DL (ref 8–22)
CALCIUM SERPL-MCNC: 9.7 MG/DL (ref 8.5–10.5)
CALCIUM SERPL-MCNC: 9.8 MG/DL (ref 8.5–10.5)
CHLORIDE SERPL-SCNC: 100 MMOL/L (ref 96–112)
CHLORIDE SERPL-SCNC: 101 MMOL/L (ref 96–112)
CHOLEST SERPL-MCNC: 219 MG/DL (ref 100–199)
CO2 SERPL-SCNC: 27 MMOL/L (ref 20–33)
CO2 SERPL-SCNC: 27 MMOL/L (ref 20–33)
CREAT SERPL-MCNC: 0.79 MG/DL (ref 0.5–1.4)
CREAT SERPL-MCNC: 0.8 MG/DL (ref 0.5–1.4)
EOSINOPHIL # BLD AUTO: 0.17 K/UL (ref 0–0.51)
EOSINOPHIL NFR BLD: 4.6 % (ref 0–6.9)
ERYTHROCYTE [DISTWIDTH] IN BLOOD BY AUTOMATED COUNT: 47.2 FL (ref 35.9–50)
ERYTHROCYTE [DISTWIDTH] IN BLOOD BY AUTOMATED COUNT: 47.3 FL (ref 35.9–50)
FASTING STATUS PATIENT QL REPORTED: NORMAL
FASTING STATUS PATIENT QL REPORTED: NORMAL
GLOBULIN SER CALC-MCNC: 2.7 G/DL (ref 1.9–3.5)
GLOBULIN SER CALC-MCNC: 2.7 G/DL (ref 1.9–3.5)
GLUCOSE SERPL-MCNC: 92 MG/DL (ref 65–99)
GLUCOSE SERPL-MCNC: 93 MG/DL (ref 65–99)
HCT VFR BLD AUTO: 42.9 % (ref 37–47)
HCT VFR BLD AUTO: 43 % (ref 37–47)
HCV AB SER QL: NORMAL
HDLC SERPL-MCNC: 104 MG/DL
HGB BLD-MCNC: 13.9 G/DL (ref 12–16)
HGB BLD-MCNC: 14.1 G/DL (ref 12–16)
IMM GRANULOCYTES # BLD AUTO: 0.01 K/UL (ref 0–0.11)
IMM GRANULOCYTES NFR BLD AUTO: 0.3 % (ref 0–0.9)
LDLC SERPL CALC-MCNC: 97 MG/DL
LYMPHOCYTES # BLD AUTO: 1.45 K/UL (ref 1–4.8)
LYMPHOCYTES NFR BLD: 39.1 % (ref 22–41)
MCH RBC QN AUTO: 32.4 PG (ref 27–33)
MCH RBC QN AUTO: 32.9 PG (ref 27–33)
MCHC RBC AUTO-ENTMCNC: 32.4 G/DL (ref 33.6–35)
MCHC RBC AUTO-ENTMCNC: 32.8 G/DL (ref 33.6–35)
MCV RBC AUTO: 100 FL (ref 81.4–97.8)
MCV RBC AUTO: 100.2 FL (ref 81.4–97.8)
MONOCYTES # BLD AUTO: 0.25 K/UL (ref 0–0.85)
MONOCYTES NFR BLD AUTO: 6.7 % (ref 0–13.4)
NEUTROPHILS # BLD AUTO: 1.79 K/UL (ref 2–7.15)
NEUTROPHILS NFR BLD: 48.2 % (ref 44–72)
NRBC # BLD AUTO: 0 K/UL
NRBC BLD-RTO: 0 /100 WBC
PLATELET # BLD AUTO: 304 K/UL (ref 164–446)
PLATELET # BLD AUTO: 307 K/UL (ref 164–446)
PMV BLD AUTO: 9.8 FL (ref 9–12.9)
PMV BLD AUTO: 9.9 FL (ref 9–12.9)
POTASSIUM SERPL-SCNC: 4.8 MMOL/L (ref 3.6–5.5)
POTASSIUM SERPL-SCNC: 4.8 MMOL/L (ref 3.6–5.5)
PROT SERPL-MCNC: 7.5 G/DL (ref 6–8.2)
PROT SERPL-MCNC: 7.6 G/DL (ref 6–8.2)
RBC # BLD AUTO: 4.29 M/UL (ref 4.2–5.4)
RBC # BLD AUTO: 4.29 M/UL (ref 4.2–5.4)
SODIUM SERPL-SCNC: 139 MMOL/L (ref 135–145)
SODIUM SERPL-SCNC: 140 MMOL/L (ref 135–145)
TRIGL SERPL-MCNC: 89 MG/DL (ref 0–149)
TSH SERPL DL<=0.005 MIU/L-ACNC: 5.3 UIU/ML (ref 0.38–5.33)
WBC # BLD AUTO: 3.7 K/UL (ref 4.8–10.8)
WBC # BLD AUTO: 3.9 K/UL (ref 4.8–10.8)

## 2020-06-06 PROCEDURE — 36415 COLL VENOUS BLD VENIPUNCTURE: CPT

## 2020-06-06 PROCEDURE — 84443 ASSAY THYROID STIM HORMONE: CPT

## 2020-06-06 PROCEDURE — 85025 COMPLETE CBC W/AUTO DIFF WBC: CPT

## 2020-06-06 PROCEDURE — 80053 COMPREHEN METABOLIC PANEL: CPT

## 2020-06-06 PROCEDURE — 80053 COMPREHEN METABOLIC PANEL: CPT | Mod: 91

## 2020-06-06 PROCEDURE — 86803 HEPATITIS C AB TEST: CPT

## 2020-06-06 PROCEDURE — 85027 COMPLETE CBC AUTOMATED: CPT

## 2020-06-06 PROCEDURE — 80061 LIPID PANEL: CPT

## 2020-06-06 PROCEDURE — 82306 VITAMIN D 25 HYDROXY: CPT

## 2020-07-17 ENCOUNTER — TELEMEDICINE (OUTPATIENT)
Dept: PHYSICAL MEDICINE AND REHAB | Facility: MEDICAL CENTER | Age: 60
End: 2020-07-17
Payer: COMMERCIAL

## 2020-07-17 VITALS — HEIGHT: 63 IN | BODY MASS INDEX: 22.68 KG/M2 | WEIGHT: 128 LBS | TEMPERATURE: 98 F | HEART RATE: 73 BPM

## 2020-07-17 DIAGNOSIS — F43.21 SITUATIONAL DEPRESSION: ICD-10-CM

## 2020-07-17 DIAGNOSIS — M54.17 LUMBOSACRAL RADICULOPATHY: ICD-10-CM

## 2020-07-17 DIAGNOSIS — M51.27 PROTRUSION OF INTERVERTEBRAL DISC OF LUMBOSACRAL REGION: ICD-10-CM

## 2020-07-17 PROCEDURE — 99214 OFFICE O/P EST MOD 30 MIN: CPT | Mod: 95,CR | Performed by: PHYSICAL MEDICINE & REHABILITATION

## 2020-07-17 RX ORDER — DULOXETIN HYDROCHLORIDE 60 MG/1
60 CAPSULE, DELAYED RELEASE ORAL DAILY
Qty: 90 CAP | Refills: 0 | Status: SHIPPED | OUTPATIENT
Start: 2020-09-03 | End: 2020-12-01

## 2020-07-17 ASSESSMENT — PATIENT HEALTH QUESTIONNAIRE - PHQ9: CLINICAL INTERPRETATION OF PHQ2 SCORE: 0

## 2020-07-17 ASSESSMENT — FIBROSIS 4 INDEX: FIB4 SCORE: 0.82

## 2020-07-17 NOTE — PROGRESS NOTES
Telemedicine Visit: Established Patient     This encounter was conducted via Zoom .   Verbal consent was obtained. Patient's identity was verified.    Subjective:   CC: Low back pain  Adelia Durant is a 60 y.o. female presenting for evaluation and management of low back pain and right leg.    She reports that she has stopped taking gabapentin, but is taking a muscle relaxant twice a day. This is usually morning and supper time.    Her big toe is a little numb in the morning and this does start to get achy through the course of the day.  She is taking duloxetine 60mg daily.  This seems to be helping and she does not have side effects.  She has been able to cope with her pain and she is not feeling hopeless.  Pain is 5/10 on the NRS.    Her routine has been a little bit changed as she is caring for her Mom.  She has been trying to help out with her Mom as much as possible.  Her Mother is wheelchair bound.    In the past, she had about 8 weeks of physical therapy.  She has not been able to do much therapy, but she does think that this was helpful.    ROS: Unchanged from 06/05/2020  Denies any recent fevers or chills. No nausea or vomiting. No chest pains or shortness of breath.     Allergies   Allergen Reactions   • Pcn [Penicillins]    • Tetanus Toxoid Hives and Rash     Body aches       Current medicines (including changes today)  Current Outpatient Medications   Medication Sig Dispense Refill   • [START ON 9/3/2020] DULoxetine (CYMBALTA) 60 MG Cap DR Particles delayed-release capsule Take 1 Cap by mouth every day for 90 days. 90 Cap 0   • Cimetidine (TAGAMET PO) Take  by mouth.     • Loratadine (CLARITIN PO) Take  by mouth.     • methocarbamol (ROBAXIN) 500 MG Tab      • Acetaminophen (TYLENOL PO) Take  by mouth.     • Cholecalciferol (VITAMIN D-3) 1000 units Cap Take 1 Cap by mouth every day. 30 Cap 11   • Omega-3 Fatty Acids (FISH OIL) 1000 MG Cap capsule Take 1,000 mg by mouth 3 times a day, with meals.      • Multiple Vitamins-Minerals (CENTRUM SILVER PO) Take  by mouth.     • gabapentin (NEURONTIN) 100 MG Cap Take 100 mg by mouth 3 times a day.     • Naproxen Sodium (ALEVE PO) Take  by mouth.     • diphenhydrAMINE (BENADRYL) 25 MG Tab Take 25 mg by mouth every 6 hours as needed for Sleep.     • ondansetron (ZOFRAN) 4 MG Tab tablet Take 1 Tab by mouth every four hours as needed for Nausea/Vomiting. (Patient not taking: Reported on 6/5/2020) 20 Tab 1   • ranitidine (ZANTAC) 150 MG Tab Take 1 Tab by mouth 2 times a day. 60 Tab 11     No current facility-administered medications for this visit.        Patient Active Problem List    Diagnosis Date Noted   • Chronic right-sided low back pain with right-sided sciatica 12/20/2019   • Labile blood pressure 12/20/2019   • Vitamin D deficiency 05/15/2018   • SAL (generalized anxiety disorder) 04/04/2017   • Sleep deficient 11/08/2016   • Situational depression 11/08/2016   • GERD (gastroesophageal reflux disease) 02/17/2015   • History of carcinoma in situ of cervix uteri 07/27/2011       Family History   Problem Relation Age of Onset   • Cancer Father 66        colon   • Alcohol abuse Father    • Bipolar disorder Father    • Cancer Mother 66        breast, lumpectomy   • Hypertension Mother    • Heart Disease Mother         enlarged heart   • GI Disease Mother         perforated diverticulitis, colostomy   • Stroke Maternal Grandmother    • Cancer Maternal Grandmother         ovarian and gastric   • Stroke Paternal Grandmother    • Heart Disease Maternal Uncle 42        heart demise   • Alcohol abuse Sister    • Alcohol abuse Brother    • Drug abuse Brother    • Suicide Attempts Child        She  has a past medical history of Carcinoma in situ of cervix (1980), Cataracts, bilateral, Depression, Family history of breast cancer in mother, Family history of colon cancer, SAL (generalized anxiety disorder) (4/4/2017), GERD (gastroesophageal reflux disease), Joint stiffness of  "hand, Moderate episode of recurrent major depressive disorder (HCC) (11/30/2016), and Multiple gastric polyps (2008).  She  has a past surgical history that includes dilation and curettage cone biopsy (1979); colonoscopy (2008, 4/2013); and cataract extraction with iol.       Objective:   Pulse 73   Temp 36.7 °C (98 °F)   Ht 1.6 m (5' 3\")   Wt 58.1 kg (128 lb)   LMP 07/27/2006   BMI 22.67 kg/m²     Physical Exam:  Constitutional: Alert, no distress, well-groomed.  Skin: No rashes in visible areas.  Eye: Round. Conjunctiva clear, lids normal. No icterus.   ENT: Lips pink without lesions, good dentition, moist mucous membranes. Phonation normal.  Neck: No masses, no thyromegaly. Moves freely without pain.  CV: Pulse as reported by patient  Respiratory: Unlabored respiratory effort, no cough or audible wheeze  Psych: Alert and oriented x3, normal affect and mood.       Assessment and Plan:   The following treatment plan was discussed:     1. Protrusion of intervertebral disc of lumbosacral region  - DULoxetine (CYMBALTA) 60 MG Cap DR Particles delayed-release capsule; Take 1 Cap by mouth every day for 90 days.  Dispense: 90 Cap; Refill: 0    2. Lumbosacral radiculopathy  - DULoxetine (CYMBALTA) 60 MG Cap DR Particles delayed-release capsule; Take 1 Cap by mouth every day for 90 days.  Dispense: 90 Cap; Refill: 0    3. Situational depression      1. Continue duloxetine 60mg daily.  Mood is improved and pain is improved.  2. Advised that she continue home exercise program from PT.  She should do this about 5 times a week  3. Trial weaning her methocarbamol by 1/2 every few days and then discontinue.  She does not feel like she needs a refill at this time.  4. Advised that she follow-up with Dr. Macedo if she is going to be in the [facility] for an extended period.    Follow-up: Return in about 4 months (around 11/17/2020).           "

## 2020-11-19 ENCOUNTER — OFFICE VISIT (OUTPATIENT)
Dept: PHYSICAL MEDICINE AND REHAB | Facility: MEDICAL CENTER | Age: 60
End: 2020-11-19
Payer: COMMERCIAL

## 2020-11-19 VITALS
OXYGEN SATURATION: 98 % | HEART RATE: 85 BPM | TEMPERATURE: 97.9 F | SYSTOLIC BLOOD PRESSURE: 128 MMHG | DIASTOLIC BLOOD PRESSURE: 70 MMHG | HEIGHT: 63 IN | WEIGHT: 130.73 LBS | BODY MASS INDEX: 23.16 KG/M2

## 2020-11-19 DIAGNOSIS — M54.17 LUMBOSACRAL RADICULOPATHY: ICD-10-CM

## 2020-11-19 DIAGNOSIS — F43.21 SITUATIONAL DEPRESSION: ICD-10-CM

## 2020-11-19 DIAGNOSIS — M51.36 DDD (DEGENERATIVE DISC DISEASE), LUMBAR: ICD-10-CM

## 2020-11-19 DIAGNOSIS — M51.27 PROTRUSION OF INTERVERTEBRAL DISC OF LUMBOSACRAL REGION: ICD-10-CM

## 2020-11-19 PROCEDURE — 99214 OFFICE O/P EST MOD 30 MIN: CPT | Performed by: PHYSICAL MEDICINE & REHABILITATION

## 2020-11-19 ASSESSMENT — PATIENT HEALTH QUESTIONNAIRE - PHQ9
5. POOR APPETITE OR OVEREATING: 0 - NOT AT ALL
CLINICAL INTERPRETATION OF PHQ2 SCORE: 1
SUM OF ALL RESPONSES TO PHQ QUESTIONS 1-9: 2

## 2020-11-19 ASSESSMENT — PAIN SCALES - GENERAL: PAINLEVEL: 7=MODERATE-SEVERE PAIN

## 2020-11-19 ASSESSMENT — FIBROSIS 4 INDEX: FIB4 SCORE: 0.82

## 2020-11-19 NOTE — PROGRESS NOTES
Follow-up patient note    Physiatry (physical medicine and  Rehabilitation), interventional spine and sports medicine    Date of Service: 11/19/2020    Chief complaint:   Chief Complaint   Patient presents with   • Follow-Up     Back pain and head       HISTORY    HPI: Adelia Durant 60 y.o. female who presents today for evaluation of low back and right leg pain.  She was initially seen on 06/05/2020 and then was in [place], [place].    She reports that since her last visit on 07/17/2020, she thinks that she has been doing pretty well.  Her back pain is better than it was and she can sit comfortably.  Still, she is having back pain, right leg pain is significantly improved.    She did start to reduce her muscle relaxant, but started to have some numbness and cramping.  Currently, she is taking this every other day.    She has been doing her home exercise program more regularly now.  Her back pain seems like it is better.  By her report, she thinks that her back pain is a 2/10 on the NRS, although at times, this is worse.    She has continued to take duloxetine 60mg daily.  She has stopped taking gabapentin, feels like it might have been making her feel more tearful. Of note, over the last few months, she has been very stressed and helping to care for her mother with metastatic breast cancer.  Her mother passed July 31 and she returned in August 2020.    She has bee having more issues with headaches this week.  This is something that she has not had much trouble with for a while.     PHQ-9: 2, denies suicidal thoughts.  Improved from 9 at initial visit    Medical records review:  I reviewed the note from the referring provider Elsie Pizarro A* dated 12/20/2019    Previous treatments:    Physical Therapy: Yes    Medications the patient is tried: NSAIDs, gabapentin and muscle relaxers    Previous interventions: injections at [facility], two epidurals    Previous surgeries to relieve the above pain:  none      ROS:  Unchanged from 06/05/2020 except for note of headaches  Eyes: dizziness  ENT: ringing in her ears  Psych: depression  Red Flags ROS:   Fever, Chills, Sweats: Denies  Involuntary Weight Loss: Denies  Bladder Incontinence: Denies  Bowel Incontinence: Denies  Saddle Anesthesia: Denies    All other systems reviewed and negative.       PMHx:   Past Medical History:   Diagnosis Date   • Carcinoma in situ of cervix 1980   • Cataracts, bilateral    • Depression    • Family history of breast cancer in mother    • Family history of colon cancer    • SAL (generalized anxiety disorder) 4/4/2017   • GERD (gastroesophageal reflux disease)     hiatal hernia, Dr. Ozuna   • Joint stiffness of hand    • Moderate episode of recurrent major depressive disorder (HCC) 11/30/2016   • Multiple gastric polyps 2008       PSHx:   Past Surgical History:   Procedure Laterality Date   • DILATION AND CURETTAGE CONE BIOPSY  1979    abnormal pap, carcinoma in situ   • CATARACT EXTRACTION WITH IOL      Dr. Finn   • COLONOSCOPY  2008, 4/2013    normal except for diverticulosis, Dr. Ozuna       Family history   Family History   Problem Relation Age of Onset   • Cancer Father 66        colon   • Alcohol abuse Father    • Bipolar disorder Father    • Cancer Mother 66        breast, lumpectomy   • Hypertension Mother    • Heart Disease Mother         enlarged heart   • GI Disease Mother         perforated diverticulitis, colostomy   • Stroke Maternal Grandmother    • Cancer Maternal Grandmother         ovarian and gastric   • Stroke Paternal Grandmother    • Heart Disease Maternal Uncle 42        heart demise   • Alcohol abuse Sister    • Alcohol abuse Brother    • Drug abuse Brother    • Suicide Attempts Child          Medications:   Current Outpatient Medications   Medication   • DULoxetine (CYMBALTA) 60 MG Cap DR Particles delayed-release capsule   • Cimetidine (TAGAMET PO)   • Loratadine (CLARITIN PO)   • methocarbamol (ROBAXIN) 500 MG Tab   •  Naproxen Sodium (ALEVE PO)   • Cholecalciferol (VITAMIN D-3) 1000 units Cap   • Multiple Vitamins-Minerals (CENTRUM SILVER PO)   • Acetaminophen (TYLENOL PO)   • diphenhydrAMINE (BENADRYL) 25 MG Tab   • ondansetron (ZOFRAN) 4 MG Tab tablet   • Omega-3 Fatty Acids (FISH OIL) 1000 MG Cap capsule   • ranitidine (ZANTAC) 150 MG Tab     No current facility-administered medications for this visit.        Allergies:   Allergies   Allergen Reactions   • Pcn [Penicillins]    • Tetanus Toxoid Hives and Rash     Body aches       Social Hx:   Social History     Socioeconomic History   • Marital status:      Spouse name: Not on file   • Number of children: Not on file   • Years of education: Not on file   • Highest education level: Not on file   Occupational History   • Not on file   Social Needs   • Financial resource strain: Not on file   • Food insecurity     Worry: Not on file     Inability: Not on file   • Transportation needs     Medical: Not on file     Non-medical: Not on file   Tobacco Use   • Smoking status: Former Smoker     Packs/day: 0.50     Years: 5.00     Pack years: 2.50     Types: Cigarettes     Quit date: 3/1/1983     Years since quittin.7   • Smokeless tobacco: Never Used   Substance and Sexual Activity   • Alcohol use: Yes     Alcohol/week: 3.0 oz     Types: 5 Standard drinks or equivalent per week     Frequency: Monthly or less   • Drug use: No     Comment: cbd oil   • Sexual activity: Yes     Partners: Male     Comment: Occassionally   Lifestyle   • Physical activity     Days per week: Not on file     Minutes per session: Not on file   • Stress: Not on file   Relationships   • Social connections     Talks on phone: Not on file     Gets together: Not on file     Attends Religion service: Not on file     Active member of club or organization: Not on file     Attends meetings of clubs or organizations: Not on file     Relationship status: Not on file   • Intimate partner violence     Fear of  "current or ex partner: Not on file     Emotionally abused: Not on file     Physically abused: Not on file     Forced sexual activity: Not on file   Other Topics Concern   •  Service No   • Blood Transfusions No   • Caffeine Concern No   • Occupational Exposure No   • Hobby Hazards No   • Sleep Concern Yes   • Stress Concern Yes   • Weight Concern No   • Special Diet No   • Back Care Yes   • Exercise Yes   • Bike Helmet No   • Seat Belt Yes   • Self-Exams No   Social History Narrative   • Not on file         EXAMINATION     Physical Exam:   Vitals: /70 (BP Location: Right arm, Patient Position: Sitting, BP Cuff Size: Small adult)   Pulse 85   Temp 36.6 °C (97.9 °F) (Temporal)   Ht 1.6 m (5' 3\")   Wt 59.3 kg (130 lb 11.7 oz)   SpO2 98%     Constitutional:   Body Habitus: Body mass index is 23.16 kg/m².  Cooperation: Fully cooperates with exam  Appearance: Well-groomed, well-nourished, not disheveled, in no acute distress    Eyes: No scleral icterus, no proptosis     ENT -no obvious auditory deficits, wearing a mask    Skin -no rashes or lesions noted     Respiratory-  breathing comfortable on room air, no audible wheezing    Cardiovascular- No lower extremity edema is noted.     Psychiatric- alert and oriented ×3. Normal affect.     Gait - normal gait, no use of ambulatory device, nonantalgic.     Musculoskeletal -   Cervical spine   Inspection: No deformities of the skin over the cervical spine.     full  A/P ROM in all directions, without  pain      Thoracic/Lumbar Spine/Sacral Spine/Hips   Inspection: No evidence of atrophy in bilateral lower extremities throughout     ROM: Decreased AROM with flexion, extension, lateral flexion, and rotation bilaterally, with pain at end range.  There is note of prominence of thoracic paraspinals and mild scoliosis    Palpation:   No tenderness to palpation in the left and right of the midline T1-L5  palpation over SI joint: negative bilaterally    palpation " over buttock: negative bilaterally    palpation in hip or over the greater trochanters: negative bilaterally      Lumbar spine Special tests  Neuro tension  Straight leg test positive right, negative left      HIP  Range of motion in the hips is within normal limits in internal and external ROM bilaterally    Neuro     Key points for the international standards for neurological classification of spinal cord injury (ISNCSCI) to light touch.     Dermatome R L   C4 2 2   C5 2 2   C6 2 2   C7 2 2   C8 2 2   T1 2 2   T2 2 2   L2 2 2   L3 2 2   L4 2 2   L5 2 2   S1 2 2   S2 2 2         Motor Exam Upper Extremities   ? Myotome R L   Shoulder flexion C5 5 5   Elbow flexion C5 5 5   Wrist extension C6 5 5   Elbow extension C7 5 5   Finger flexion C8 5 5   Finger abduction T1 5 5         Motor Exam Lower Extremities    ? Myotome R L   Hip flexion L2 5 5   Knee extension L3 5 5   Ankle dorsiflexion L4 5 5   Toe extension L5 4 5   Ankle plantarflexion S1 5 5       Moy’s sign negative bilaterally   Clonus of the ankle negative bilaterally     Reflexes  ?  R L   Biceps  2+ 2+   Brachioradialis  2+ 2+   Patella  2+ 2+   Achilles   2+ 2+       MEDICAL DECISION MAKING    Medical records review: see under HPI section.     DATA    Labs:   Lab Results   Component Value Date/Time    SODIUM 140 06/06/2020 08:26 AM    POTASSIUM 4.8 06/06/2020 08:26 AM    CHLORIDE 101 06/06/2020 08:26 AM    CO2 27 06/06/2020 08:26 AM    ANION 12.0 06/06/2020 08:26 AM    GLUCOSE 92 06/06/2020 08:26 AM    BUN 14 06/06/2020 08:26 AM    CREATININE 0.80 06/06/2020 08:26 AM    CREATININE 0.76 03/26/2010 07:29 AM    CALCIUM 9.7 06/06/2020 08:26 AM    ASTSGOT 16 06/06/2020 08:26 AM    ALTSGPT 16 06/06/2020 08:26 AM    TBILIRUBIN 0.5 06/06/2020 08:26 AM    ALBUMIN 4.8 06/06/2020 08:26 AM    TOTPROTEIN 7.5 06/06/2020 08:26 AM    GLOBULIN 2.7 06/06/2020 08:26 AM    AGRATIO 1.8 06/06/2020 08:26 AM       No results found for: PROTHROMBTM, INR     Lab Results    Component Value Date/Time    WBC 3.7 (L) 06/06/2020 08:26 AM    WBC 5.4 03/26/2010 07:29 AM    RBC 4.29 06/06/2020 08:26 AM    RBC 4.43 03/26/2010 07:29 AM    HEMOGLOBIN 13.9 06/06/2020 08:26 AM    HEMATOCRIT 42.9 06/06/2020 08:26 AM    .0 (H) 06/06/2020 08:26 AM    MCV 96 03/26/2010 07:29 AM    MCH 32.4 06/06/2020 08:26 AM    MCH 31.8 03/26/2010 07:29 AM    MCHC 32.4 (L) 06/06/2020 08:26 AM    MPV 9.9 06/06/2020 08:26 AM    NEUTSPOLYS 48.20 06/06/2020 08:26 AM    LYMPHOCYTES 39.10 06/06/2020 08:26 AM    MONOCYTES 6.70 06/06/2020 08:26 AM    EOSINOPHILS 4.60 06/06/2020 08:26 AM    BASOPHILS 1.10 06/06/2020 08:26 AM        No results found for: HBA1C     Imaging: I personally reviewed following images, these are my reads  MRI lumbar spine 08/21/2019  At L5-S1, there is a right paracentral disc protrusion that causes right lateral recess stenosis and appears to abut the right S1 nerve root.  There is no central or foraminal canal stenosis  At L4-5, mild disc dessication.  There is a diffuse disc bulge without central or foraminal narrowing  At L3-4, mild disc dessication.  No central canal stenosis, mild left foraminal narrowing with left bulge with foraminal disc protrusion  At L2-3, mild diffuse disc bulge, no central or foraminal stenosis  At L1-2, no central or foraminal stenosis    IMAGING radiology reads. I reviewed the following radiology reads                                 Results for orders placed in visit on 08/21/19   MR-LUMBAR SPINE-W/O    Impression 1.  There is right paracentral disc protrusion at L5-S1 causing effacement of the right lateral recess. The disc is abutting the exiting right S1 nerve root at the lateral recess.  2.  Broad-based right foraminal disc protrusion at L3-4 causing mild stenosis of right L3 neural foramina.  3.  Degenerative disease as described above.                      Results for orders placed during the hospital encounter of 05/07/08   DX-CERVICAL SPINE-2 OR 3  VIEWS    Impression IMPRESSION:     1. NORMAL CERVICAL SPINE.    2. QUESTION OF THORACIC SCOLIOSIS.      MPW/llw      Read By ELENO NEVAREZ MD on May  7 2008 11:43AM  : LLW Transcription Date: May  8 2008  2:45PM  THIS DOCUMENT HAS BEEN ELECTRONICALLY SIGNED BY: ELENO NEVAREZ MD on May    8 2008  4:28PM                                                         Results for orders placed in visit on 09/16/14   DX-PELVIS-1 OR 2 VIEWS    Impression Mild bilateral hip osteoarthritis and femoral head morphology could predispose to cam type impingement                                       Diagnosis   Visit Diagnoses     ICD-10-CM   1. Lumbosacral radiculopathy  M54.17   2. Protrusion of intervertebral disc of lumbosacral region  M51.27   3. DDD (degenerative disc disease), lumbar  M51.36   4. Situational depression  F43.21         ASSESSMENT:  Adelia Echevarria Bhargav 60 y.o. female seen for above     Adelia was seen today for follow-up.    Diagnoses and all orders for this visit:    Lumbosacral radiculopathy  -     REFERRAL TO PHYSICAL THERAPY    Protrusion of intervertebral disc of lumbosacral region  -     REFERRAL TO PHYSICAL THERAPY    DDD (degenerative disc disease), lumbar    Situational depression  Comments:  Improving           1. Discussed that she continues to have a positive SLR.  We discussed trial of gabapentin.  If she feels like this does increase tearfulness, she will let me know.  She may benefit from TFESI, but she feels like she is doing better and would like to hold off for now and wants to avoid surgery.  Her exam is essentially stable with improved radicular symptoms except with SLR.  2. Continue duloxetine 60mg daily.  3. Continue methocarbamol prn.  4. Continue home exercise program as tolerated.  5. Advised that she follow-up with her PCP regarding report of headaches.    Outside records requested:  The patient signed outside records request form for her outside records including  outside images.      Follow-up: Return in about 2 months (around 1/19/2021).    Thank you very much for asking me to participate in Adelia Durant's care.  Please contact me with any questions or concerns.      Please note that this dictation was created using voice recognition software. I have made every reasonable attempt to correct obvious errors but there may be errors of grammar and content that I may have overlooked prior to finalization of this note.      Danny King MD  Physical Medicine and Rehabilitation  Interventional Spine and Sports Physiatry  [facility]      CC Elsie Pizarro, A

## 2020-11-23 ENCOUNTER — OFFICE VISIT (OUTPATIENT)
Dept: MEDICAL GROUP | Facility: PHYSICIAN GROUP | Age: 60
End: 2020-11-23
Payer: COMMERCIAL

## 2020-11-23 VITALS
TEMPERATURE: 97.8 F | BODY MASS INDEX: 23.39 KG/M2 | OXYGEN SATURATION: 92 % | DIASTOLIC BLOOD PRESSURE: 70 MMHG | SYSTOLIC BLOOD PRESSURE: 106 MMHG | HEART RATE: 77 BPM | HEIGHT: 63 IN | WEIGHT: 132 LBS

## 2020-11-23 DIAGNOSIS — R51.9 NONINTRACTABLE EPISODIC HEADACHE, UNSPECIFIED HEADACHE TYPE: ICD-10-CM

## 2020-11-23 DIAGNOSIS — H93.8X2 FULLNESS IN EAR, LEFT: ICD-10-CM

## 2020-11-23 DIAGNOSIS — H91.92: ICD-10-CM

## 2020-11-23 DIAGNOSIS — L20.9 ATOPIC DERMATITIS, UNSPECIFIED TYPE: ICD-10-CM

## 2020-11-23 PROCEDURE — 99214 OFFICE O/P EST MOD 30 MIN: CPT | Performed by: NURSE PRACTITIONER

## 2020-11-23 RX ORDER — TRIAMCINOLONE ACETONIDE 1 MG/G
1 OINTMENT TOPICAL 2 TIMES DAILY
Qty: 30 G | Refills: 0 | Status: SHIPPED | OUTPATIENT
Start: 2020-11-23

## 2020-11-23 RX ORDER — GABAPENTIN 100 MG/1
100 CAPSULE ORAL 3 TIMES DAILY
COMMUNITY
End: 2021-01-19

## 2020-11-23 ASSESSMENT — FIBROSIS 4 INDEX: FIB4 SCORE: 0.82

## 2020-11-23 NOTE — PROGRESS NOTES
Chief Complaint   Patient presents with   • Headache     x 1week          Subjective:     Aedlia Durant is a 60 y.o. female presenting to discuss headaches.    Patient recently went to physiatry to work on her back and was experiencing roughly 1 week of headaches.  She was recommended by provider to follow-up with primary care regarding these.  Patient states that she has had lifelong headaches which improved significantly after menopause.  She usually gets them during changing the seasons and in periods of significant stress.  Denies any vision changes, aura, numbness, tingling, weakness, other concerning sensory or cognitive changes.  Fortunately, the headache subsided on its own yesterday and she has been pain-free for over 24 hours.    Ear fullness/auditory disturbance: This is a new issue for the patient.  She feels a fullness and whooshing/heartbeat sound in her left ear.  No recent infections or significant allergies/congestion.  Does use OTC loratadine but does not notice improvement in these particular symptoms with use.    Itching: Patient reports worsened dry skin, patches of eczema that are worse during seasonal transitions and in winter due to dry climate.  Tries to use OTC emollient lotions but these are insufficient in reducing pruritus.      Review of systems:      Denies chest pain, shortness of breath, sore throat, difficulty swallowing, new cough, dizziness, severe headache, altered cognition, changes in bowel or bladder habits,  decreased strength, numbness or tingling, intolerable depression or anxiety, changes in vision, fatigue, myalgias, painful or swollen lymph nodes.       Current Outpatient Medications:   •  gabapentin (NEURONTIN) 100 MG Cap, Take 100 mg by mouth 3 times a day., Disp: , Rfl:   •  Probiotic Product (PROBIOTIC-10 PO), Take  by mouth., Disp: , Rfl:   •  triamcinolone acetonide (KENALOG) 0.1 % Ointment, Apply 1 g topically 2 times a day., Disp: 30 g, Rfl: 0  •   "DULoxetine (CYMBALTA) 60 MG Cap DR Particles delayed-release capsule, Take 1 Cap by mouth every day for 90 days., Disp: 90 Cap, Rfl: 0  •  Cimetidine (TAGAMET PO), Take  by mouth., Disp: , Rfl:   •  Loratadine (CLARITIN PO), Take  by mouth., Disp: , Rfl:   •  methocarbamol (ROBAXIN) 500 MG Tab, , Disp: , Rfl:   •  Naproxen Sodium (ALEVE PO), Take  by mouth., Disp: , Rfl:   •  Acetaminophen (TYLENOL PO), Take  by mouth., Disp: , Rfl:   •  diphenhydrAMINE (BENADRYL) 25 MG Tab, Take 25 mg by mouth every 6 hours as needed for Sleep., Disp: , Rfl:   •  Cholecalciferol (VITAMIN D-3) 1000 units Cap, Take 1 Cap by mouth every day., Disp: 30 Cap, Rfl: 11  •  Omega-3 Fatty Acids (FISH OIL) 1000 MG Cap capsule, Take 1,000 mg by mouth 3 times a day, with meals., Disp: , Rfl:   •  Multiple Vitamins-Minerals (CENTRUM SILVER PO), Take  by mouth., Disp: , Rfl:     Allergies, past medical history, past surgical history, family history, social history reviewed and updated    Objective:     Vitals: /70 (BP Location: Right arm, Patient Position: Sitting, BP Cuff Size: Adult)   Pulse 77   Temp 36.6 °C (97.8 °F) (Temporal)   Ht 1.6 m (5' 3\")   Wt 59.9 kg (132 lb)   LMP 07/27/2006   SpO2 92%   BMI 23.38 kg/m²   General: Alert, cooperative, dressed appropriately for weather / situation  Eyes:Normocephalic.  EOMI, no icterus or pallor.  Conjunctivae clear without erythema / irritation.  ENT:  External ears developed; Bilat TMs visualized; appear pearly without bulging, effusion, or erythema; crisp light reflex   Heart: Regular rate and rhythm.  S1 and S2 normal.  No murmurs auscultated;  Respiratory: Normal respiratory effort.  Clear to auscultation bilaterally.  AP ratio 1:2   Abdomen: Non-distended;   Skin: Visible skin intact, dry, without rash.  Musculoskeletal: Gait is normal.  Bilateral  strength strong equal.  Moves extremities freely and equally bilaterally  Neuro:  AAOx3 Visual tracking intact, no nystagmus; "   Psych:  Affect/mood is normal, judgement is good, memory is intact, grooming is appropriate.    Assessment/Plan:     Adelia was seen today for headache.    Diagnoses and all orders for this visit:    Nonintractable episodic headache, unspecified headache type  Comments:  Stable and resolved; reviewed concerning changes that warrant eval like sensory / motor deficits / vision changes / return of HA with new or worsening sx    Fullness in ear, left  Comments:  Due to chronicity and lack of clear inflammation / infection, referral to ENT placed   Orders:  -     REFERRAL TO ENT    Auditory disturbance, left  -     REFERRAL TO ENT    Atopic dermatitis, unspecified type  Comments:  Multiple spots of inflammed SKs over back / shoulders.  Will return for cryo.  Provided steroid ointment for patches of dry, irritated atopic dermatitis  Orders:  -     triamcinolone acetonide (KENALOG) 0.1 % Ointment; Apply 1 g topically 2 times a day.          Return in about 3 months (around 2/23/2021).    Patient verbalized understanding and agreed to plan of care.  Encouraged to contact me with needs via [employer] or by phone if needed.      I have placed the above orders and discussed them with an approved delegating provider.  The MA is performing the below orders under the direction of Dr Rodriguez.    Please note that this dictation was created using voice recognition software. I have made every reasonable attempt to correct obvious errors, but I expect that there are errors of grammar and possibly content that I did not discover before finalizing the note.

## 2020-11-27 DIAGNOSIS — M54.17 LUMBOSACRAL RADICULOPATHY: ICD-10-CM

## 2020-11-27 DIAGNOSIS — M51.27 PROTRUSION OF INTERVERTEBRAL DISC OF LUMBOSACRAL REGION: ICD-10-CM

## 2020-12-01 RX ORDER — DULOXETIN HYDROCHLORIDE 60 MG/1
CAPSULE, DELAYED RELEASE ORAL
Qty: 30 CAP | Refills: 2 | Status: SHIPPED | OUTPATIENT
Start: 2020-12-01 | End: 2021-01-19 | Stop reason: SDUPTHER

## 2020-12-02 ENCOUNTER — PHYSICAL THERAPY (OUTPATIENT)
Dept: PHYSICAL THERAPY | Facility: REHABILITATION | Age: 60
End: 2020-12-02
Attending: PHYSICAL MEDICINE & REHABILITATION
Payer: COMMERCIAL

## 2020-12-02 DIAGNOSIS — M51.27 PROTRUSION OF INTERVERTEBRAL DISC OF LUMBOSACRAL REGION: ICD-10-CM

## 2020-12-02 DIAGNOSIS — M54.17 LUMBOSACRAL RADICULOPATHY: ICD-10-CM

## 2020-12-02 PROCEDURE — 97012 MECHANICAL TRACTION THERAPY: CPT

## 2020-12-02 PROCEDURE — 97161 PT EVAL LOW COMPLEX 20 MIN: CPT

## 2020-12-02 ASSESSMENT — ENCOUNTER SYMPTOMS
PAIN SCALE AT HIGHEST: 10
QUALITY: NUMBNESS
QUALITY: STABBING
PAIN SCALE: 0

## 2020-12-02 NOTE — OP THERAPY EVALUATION
"  Outpatient Physical Therapy  INITIAL EVALUATION    [facility] Physical Therapy [address]  [address]  [place] [address]  Phone:  [phone]  Fax:  [phone]    Date of Evaluation: 12/02/2020    Patient: Diana Durant  YOB: 1960  MRN: [id number]     Referring Provider: Danny King M.D.  [address]  [place] [address]   Referring Diagnosis Lumbosacral radiculopathy [M54.17];Protrusion of intervertebral disc of lumbosacral region [M51.27]     Time Calculation    Start time: 1100  Stop time: 1200 Time Calculation (min): 60 minutes         Chief Complaint: Back Problem    Visit Diagnoses     ICD-10-CM   1. Lumbosacral radiculopathy  M54.17   2. Protrusion of intervertebral disc of lumbosacral region  M51.27         Subjective:   History of Present Illness:     Mechanism of injury:  Pt \"Diana\" states her back is a lot better than it was. Dr. King wanted her to do therapy because of the positive SLR on the R. It was a lot better than it was, was in tears previously. Yesterday felt it more on the L side of the back and felt sharp pain in the back of the R knee. Today it feels pretty good. She started taking gabapentin. Bending makes the pain worse, tries to avoid it, afraid the pain would back. Needed help getting dressed from her . Was lifting a lot at work, works as a caregiver. Last year she had a week at work when she was doing a lot of lifting and the next week, she could not sit. Getting in and out of the car and bed was the worst. Has not been working because she was taking care of her mom. Goal is for her to get back to work at least part time. Not sure if she wants to return to care-giving, but may try to find something more light duty. Thinks it was a combination of things that helped: medication, traction, couple chiropractic sessions.    Gets intermittent HA, but they were getting more frequent, talking to Elsie Pizarro about it. "     PMHx: pt denies cardiac issues, does have some GI issues  Sleep disturbance: pretty good.  Pain:     Current pain ratin (0/10 in back/RLE)    At worst pain rating:  10 (10+)    Quality:  Numbness and stabbing (tension)    Pain timing: worse in the mornings and evenings.  Diagnostic Tests:     MRI studies: abnormal      Diagnostic Tests Comments:  Lx MRI IMPRESSION: (see chart for full detail of imaging)     1.  There is right paracentral disc protrusion at L5-S1 causing effacement of the right lateral recess. The disc is abutting the exiting right S1 nerve root at the lateral recess.  2.  Broad-based right foraminal disc protrusion at L3-4 causing mild stenosis of right L3 neural foramina.  3.  Degenerative disease as described above.  Treatments:     Treatment Comments:  Received PT from the [facility]  Previous stretches: prone press up (fear of getting stuck)  Patient Goals:     Patient goals for therapy:  Decreased pain, increased strength and return to work    Other patient goals:  Review exercises from the [facility]      Past Medical History:   Diagnosis Date   • Carcinoma in situ of cervix    • Cataracts, bilateral    • Depression    • Family history of breast cancer in mother    • Family history of colon cancer    • SAL (generalized anxiety disorder) 2017   • GERD (gastroesophageal reflux disease)     hiatal hernia, Dr. Ozuna   • Joint stiffness of hand    • Moderate episode of recurrent major depressive disorder (HCC) 2016   • Multiple gastric polyps      Past Surgical History:   Procedure Laterality Date   • DILATION AND CURETTAGE CONE BIOPSY      abnormal pap, carcinoma in situ   • CATARACT EXTRACTION WITH IOL      Dr. Finn   • COLONOSCOPY  2013    normal except for diverticulosis, Dr. Ozuna     Social History     Tobacco Use   • Smoking status: Former Smoker     Packs/day: 0.50     Years: 5.00     Pack years: 2.50     Types: Cigarettes     Quit date: 3/1/1983     Years since  quittin.7   • Smokeless tobacco: Never Used   Substance Use Topics   • Alcohol use: Yes     Alcohol/week: 3.0 oz     Types: 5 Standard drinks or equivalent per week     Frequency: Monthly or less     Family and Occupational History     Socioeconomic History   • Marital status:      Spouse name: Not on file   • Number of children: Not on file   • Years of education: Not on file   • Highest education level: Not on file   Occupational History   • Not on file       Objective     Hip Screen   Hip range of motion within functional limits.    Neurological Testing     Reflexes   Left   Patellar (L4): normal (2+)  Achilles (S1): normal (2+)  Ankle clonus reflex: negative    Right   Patellar (L4): normal (2+)  Achilles (S1): normal (2+)  Ankle clonus reflex: negative    Myotome testing   Lumbar (left)   L1 (hip flexors): 5  L2 (hip flexors): 5  L3 (knee extensors): 5  L4 (ankle dorsiflexors): 5  L5 (great toe extension): 5  S1 (ankle plantar flexors): 5    Lumbar (right)   L1 (hip flexors): 4+  L2 (hip flexors): 4+  L3 (knee extensors): 5  L4 (ankle dorsiflexors): 5  L5 (great toe extension): 5  S1 (ankle plantar flexors): 5    Palpation   Left   No palpable tenderness to the proximal biceps femoris, proximal semimembranosus and proximal semitendinosus.   Tenderness of the gluteus shahram, gluteus medius, lumbar paraspinals and quadratus lumborum.     Right   No palpable tenderness to the proximal biceps femoris, proximal semimembranosus and proximal semitendinosus.   Tenderness of the gluteus shahram, gluteus medius, lumbar paraspinals and quadratus lumborum.     Additional Palpation Details  TTP L>R in low back and hips    Active Range of Motion     Lumbar   Flexion: within functional limits (reaches mid shin)  Extension: within functional limits  Left lateral flexion: within functional limits  Right lateral flexion: within functional limits  Left rotation: within functional limits  Right rotation: within  "functional limits    Joint Play   Spine     Central PA [place]        L1: WFL       L2: WFL       L3: WFL       L4: WFL       L5: WFL       S1: WFL        Tests       Lumbar spine (left)      Negative slump.   Lumbar spine (right)     Positive slump.     Left Hip   SLR: Negative.     Right Hip   SLR: Positive.     General Comments     Spine Comments   (-) B heel/toe walking  Squat: WS to the RLE, increased anterior tibia tranlsation        Therapeutic Exercises (CPT 26632):     1. Prone press up - elbows, 3 min    2. Piriformis str, 20\" x 2    3. Bridge, 20x    4. Hamstring glide, 10x    20. UPOC: 01/27/2021      Therapeutic Exercise Summary: HEP: prone press up, piriformis str, hamstring glide, bridge    From previous providers: piriformis, SLR, SKTC, DKTC - pt encouraged to continue with all      Time-based treatments/modalities:    Physical Therapy Timed Treatment Charges  Therapeutic exercise minutes (CPT 10691): 5 minutes      Assessment, Response and Plan:   Impairments: activity intolerance, impaired physical strength, limited ADL's, limited mobility and pain with function    Assessment details:  Pt is a pleasant, cooperative 61 yo female who presents with low back pain with R sided radicular pain. Pt has decreased core and hip strength, (+) SLR and slump, and increased pain with bending and lifting. Pt will benefit from skilled physical therapy in order to strengthen her core and hips, decrease her pain and fear with lumbar ROM, improve her bending and lifting tolerance, decrease her neural tension, and decrease her overall pain in order to return to ADLs, work, and recreational activities with less pain and restriction.  Barriers to therapy:  None  Prognosis: good    Goals:   Short Term Goals:   1. Pt is to be able to perform squat with equal weight through BLE and good form  2. Pt is to be able to perform prone press up without fear  3. Pt is to perform HEP without cueing demonstrating compliance  Short term " goal time span:  2-4 weeks      Long Term Goals:    1. Pt is to be able to lift 10lbs with good form without fear or increase in pain  2. Pt is to feel confident in looking for part time work with limited/light lifting  3. Pt is to feel comfortable d/c to HEP for continued focus on mobility and strength.  Long term goal time span:  6-8 weeks    Plan:   Therapy options:  Physical therapy treatment to continue  Planned therapy interventions:  Neuromuscular Re-education (CPT 64084), Therapeutic Exercise (CPT 60337), Therapeutic Activities (CPT 42643), Manual Therapy (CPT 72040), Gait Training (CPT 06858), E Stim Unattended (CPT 09472) and Mechanical Traction (CPT 71560)  Frequency:  2x week  Duration in weeks:  8  Discussed with:  Patient  Plan details:  1-2x/week for 8 weeks      Functional Assessment Used  Lazarus Jeremi Low Back Pain and Disability Score: 29.17     Referring provider co-signature:  I have reviewed this plan of care and my co-signature certifies the need for services.    Certification Period: 12/02/2020 to  01/27/21    Physician Signature: ________________________________ Date: ______________

## 2020-12-08 ENCOUNTER — PHYSICAL THERAPY (OUTPATIENT)
Dept: PHYSICAL THERAPY | Facility: REHABILITATION | Age: 60
End: 2020-12-08
Attending: PHYSICAL MEDICINE & REHABILITATION
Payer: COMMERCIAL

## 2020-12-08 DIAGNOSIS — M54.17 LUMBOSACRAL RADICULOPATHY: ICD-10-CM

## 2020-12-08 DIAGNOSIS — M51.27 PROTRUSION OF INTERVERTEBRAL DISC OF LUMBOSACRAL REGION: ICD-10-CM

## 2020-12-08 PROCEDURE — 97110 THERAPEUTIC EXERCISES: CPT

## 2020-12-08 NOTE — OP THERAPY DAILY TREATMENT
"  Outpatient Physical Therapy  DAILY TREATMENT     [facility] Physical [address].  [address]  [place] [address]  Phone:  [phone]  Fax:  [phone]    Date: 12/08/2020    Patient: Diana Durant  YOB: 1960  MRN: [id number]     Time Calculation    Start time: 0900  Stop time: 0930 Time Calculation (min): 30 minutes         Chief Complaint: Back Problem    Visit #: 2    SUBJECTIVE:  Pt states she is better. If she is not lined up, she gets spasm. Doing the exercises, but not everyday.     OBJECTIVE:  Current objective measures:        Therapeutic Exercises (CPT 03757):     1. Prone press up - to elbows, 3 min    2. Bridges, 5\" x 10 x 1    3. Hamstring curl on ball - PPT, 30x    4. LTR on ball, 20x    5. LTR, 20x    6. Lumbar flexion - ball rolls (reg/lat), 5x each    20. UPOC: 01/27/2021      Therapeutic Exercise Summary:  HEP: prone press up, piriformis str, hamstring glide, bridge    Added: LTR     From previous providers: piriformis, SLR, SKTC, DKTC - pt encouraged to continue with all    MHP applied to low back in hooklying x 10 min      Time-based treatments/modalities:  Physical Therapy Timed Treatment Charges  Therapeutic exercise minutes (CPT 03440): 25 minutes      Pain rating (1-10) before treatment:  1 (low back)  Pain rating (1-10) after treatment:  1 (stretched)    ASSESSMENT:   Response to treatment: Pt presents with low back pain with R sided radicular pain. Pt able to tolerate additional core exercises and gentle back ROM exercises without increase in symptoms.     PLAN/RECOMMENDATIONS:   Plan for treatment: therapy treatment to continue next visit.  Planned interventions for next visit: continue with current treatment. Progress to quad.      "

## 2020-12-10 ENCOUNTER — PHYSICAL THERAPY (OUTPATIENT)
Dept: PHYSICAL THERAPY | Facility: REHABILITATION | Age: 60
End: 2020-12-10
Attending: PHYSICAL MEDICINE & REHABILITATION
Payer: COMMERCIAL

## 2020-12-10 DIAGNOSIS — M51.27 PROTRUSION OF INTERVERTEBRAL DISC OF LUMBOSACRAL REGION: ICD-10-CM

## 2020-12-10 DIAGNOSIS — M54.17 LUMBOSACRAL RADICULOPATHY: ICD-10-CM

## 2020-12-10 PROCEDURE — 97110 THERAPEUTIC EXERCISES: CPT

## 2020-12-10 NOTE — OP THERAPY DAILY TREATMENT
"  Outpatient Physical Therapy  DAILY TREATMENT     [facility] Physical [address].  [address]  [place] [address]  Phone:  [phone]  Fax:  [phone]    Date: 12/10/2020    Patient: Diana Durant  YOB: 1960  MRN: [id number]     Time Calculation    Start time: 1000  Stop time: 1030 Time Calculation (min): 30 minutes         Chief Complaint: Back Problem    Visit #: 3    SUBJECTIVE:  Pt states she has been good. Did okay after last session.     OBJECTIVE:  Current objective measures:        Therapeutic Exercises (CPT 97430):     1. Prone press up - to elbows, 3 min    2. Prone press up - to hands, 10x    3. Bridge, 10\" x 10    4. Hamstring curl on ball, 20x    5. Bridge on ball, 20x    6. Cat/cow, 10x    7. Quad SB, 10X    8. Quad leg extension, 10x    9. Hip hinge, 10x    10. Sit<>stand, 10x, w/ focus on hip hinge    20. UPOC: 01/27/2021      Therapeutic Exercise Summary:  HEP: prone press up, piriformis str, hamstring glide, bridge, LTR    Added: sit<>stands     From previous providers: piriformis, SLR, SKTC, DKTC - pt encouraged to continue with all    MHP applied to low back in hooklying x 10 min      Time-based treatments/modalities:  Physical Therapy Timed Treatment Charges  Therapeutic exercise minutes (CPT 12459): 25 minutes      Pain rating (1-10) before treatment:  2 (low back)  Pain rating (1-10) after treatment:  2 (stretched)    ASSESSMENT:   Response to treatment: Pt presents with low back pain with R sided radicular pain. Pt moving well into all ranges in her low back with quad exercises without increase in symptoms. Pt able to perform sit<>stand with improved hip hinge with faded cueing.    PLAN/RECOMMENDATIONS:   Plan for treatment: therapy treatment to continue next visit.  Planned interventions for next visit: continue with current treatment. Progress to standing.      "

## 2020-12-15 ENCOUNTER — PHYSICAL THERAPY (OUTPATIENT)
Dept: PHYSICAL THERAPY | Facility: REHABILITATION | Age: 60
End: 2020-12-15
Attending: PHYSICAL MEDICINE & REHABILITATION
Payer: COMMERCIAL

## 2020-12-15 DIAGNOSIS — M51.27 PROTRUSION OF INTERVERTEBRAL DISC OF LUMBOSACRAL REGION: ICD-10-CM

## 2020-12-15 DIAGNOSIS — M54.17 LUMBOSACRAL RADICULOPATHY: ICD-10-CM

## 2020-12-15 PROCEDURE — 97110 THERAPEUTIC EXERCISES: CPT

## 2020-12-15 NOTE — OP THERAPY DAILY TREATMENT
"  Outpatient Physical Therapy  DAILY TREATMENT     [facility] Physical Therapy [address].  [address]  [place] [address]  Phone:  [phone]  Fax:  [phone]    Date: 12/15/2020    Patient: Diana Durant  YOB: 1960  MRN: [id number]     Time Calculation    Start time: 0900  Stop time: 0945 Time Calculation (min): 45 minutes         Chief Complaint: Back Problem    Visit #: 4    SUBJECTIVE:  Pt states she is good. Skin is dry and itchy.    She could feel a little in the ankle this week, nothing today. Getting up and walking it out, stretching, and different creams help her.     OBJECTIVE:  Current objective measures:        Therapeutic Exercises (CPT 12164):     1. Seated hip hinge , 10x    2. Sit<>stand, 10x    3. Yousif test (R leg only), 20\" x 2    4. 3-way hip, 10x each - alt sides, VCs for decreased trunk lean    5. Standing rows, pink, 10x    6. Standing shoulder extension, pink, 10x    7. Multifidus WO, pink, 10x    20. UPOC: 01/27/2021      Therapeutic Exercise Summary:  HEP: prone press up, piriformis str, hamstring glide, bridge, LTR, sit<>stands    Added: 3-way hip, standing rows/shoulder ext, multifidus WO     From previous providers: piriformis, SLR, SKTC, DKTC - pt encouraged to continue with all    MHP applied to low back in hooklying x 10 min      Time-based treatments/modalities:  Physical Therapy Timed Treatment Charges  Therapeutic exercise minutes (CPT 29884): 25 minutes      Pain rating (1-10) before treatment:  0  Pain rating (1-10) after treatment:  0    ASSESSMENT:   Response to treatment: Pt presents with low back pain with R sided radicular pain. Pt able to to tolerate progression to standing exercises with faded cueing for pelvic positioning in 3-way hip and scap positioning with rows.     PLAN/RECOMMENDATIONS:   Plan for treatment: therapy treatment to continue next visit.  Planned interventions for next visit: continue with current treatment. Likely " d/c pending progress. Review body mechanics with household chores.

## 2020-12-17 ENCOUNTER — PHYSICAL THERAPY (OUTPATIENT)
Dept: PHYSICAL THERAPY | Facility: REHABILITATION | Age: 60
End: 2020-12-17
Attending: PHYSICAL MEDICINE & REHABILITATION
Payer: COMMERCIAL

## 2020-12-17 DIAGNOSIS — M51.27 PROTRUSION OF INTERVERTEBRAL DISC OF LUMBOSACRAL REGION: ICD-10-CM

## 2020-12-17 DIAGNOSIS — M54.17 LUMBOSACRAL RADICULOPATHY: ICD-10-CM

## 2020-12-17 PROCEDURE — 97110 THERAPEUTIC EXERCISES: CPT

## 2020-12-17 NOTE — OP THERAPY DISCHARGE SUMMARY
Outpatient Physical Therapy  DISCHARGE SUMMARY NOTE      [facility] Physical Therapy [address].  [address]  [place] [address]  Phone:  [phone]  Fax:  [phone]    Date of Visit: 12/17/2020    Patient: Diana Durant  YOB: 1960  MRN: [id number]     Referring Provider: Danny King M.D.  [address]  [address]  [place] [address]   Referring Diagnosis Radiculopathy, lumbosacral region [M54.17];Other intervertebral disc displacement, lumbosacral region [M51.27]         Functional Assessment Used  Lazarus Jeremi Low Back Pain and Disability Score: 12.5     Your patient is being discharged from Physical Therapy with the following comments:   · Goals met    Comments:  Pt presents with low back pain with R sided radicular pain. Pt has decreased fear of movement and pain with functional movement. Pt has further improved control and body mechanics with squats, lifting, and carrying with minimal to no cueing. Pt feels ready to d/c to HEP.    Limitations Remaining:  None    Recommendations:  Pt to d/c to HEP    Cynthia Yuen, PT, DPT    Date: 12/17/2020

## 2020-12-17 NOTE — OP THERAPY DAILY TREATMENT
Outpatient Physical Therapy  DAILY TREATMENT     [facility] Physical [address]  [address]  [place] [address]  Phone:  [phone]  Fax:  [phone]    Date: 12/17/2020    Patient: Diana Durant  YOB: 1960  MRN: [id number]     Time Calculation    Start time: 0930  Stop time: 1000 Time Calculation (min): 30 minutes         Chief Complaint: Back Problem    Visit #: 5    SUBJECTIVE:  Pt states she is comfortable d/c.     OBJECTIVE:  Current objective measures:    Squat: equal weight BLE, good form with min cueing  Lifting: can  10lbs from ground - no cueing for body mechanics  Considering looking into caregiving  Lazarus Jeremi Low Back Pain and Disability Score: 12.5      Short Term Goals:   1. Pt is to be able to perform squat with equal weight through BLE and good form, MET  2. Pt is to be able to perform prone press up without fear. MET  3. Pt is to perform HEP without cueing demonstrating compliance. MET  Short term goal time span:  2-4 weeks      Long Term Goals:    1. Pt is to be able to lift 10lbs with good form without fear or increase in pain. MET  2. Pt is to feel confident in looking for part time work with limited/light lifting. MET  3. Pt is to feel comfortable d/c to HEP for continued focus on mobility and strength. MET  Long term goal time span:  6-8 weeks       Therapeutic Exercises (CPT 25662):     1. Reviewed HEP, specifically multifidus WO    2. Reassessed for d/c     3. Discussed self progression/regression if pt has flare up    4. Body mechanics education, lift with legs, stay close to object, move feet    20. UPOC: 01/27/2021      Therapeutic Exercise Summary:  HEP: prone press up, piriformis str, hamstring glide, bridge, LTR, sit<>stands, 3-way hip, standing rows/shoulder ext, multifidus WO     From previous providers: piriformis, SLR, SKTC, DKTC - pt encouraged to continue with all        Time-based treatments/modalities:  Physical Therapy  Timed Treatment Charges  Therapeutic exercise minutes (CPT 49094): 25 minutes      Pain rating (1-10) before treatment:  0  Pain rating (1-10) after treatment:  0    ASSESSMENT:   Response to treatment: Pt presents with low back pain with R sided radicular pain. Pt has decreased fear of movement and pain with functional movement. Pt has further improved control and body mechanics with squats, lifting, and carrying with minimal to no cueing. Pt feels ready to d/c to HEP.    PLAN/RECOMMENDATIONS:   Pt to d/c to HEP

## 2021-01-19 ENCOUNTER — OFFICE VISIT (OUTPATIENT)
Dept: PHYSICAL MEDICINE AND REHAB | Facility: MEDICAL CENTER | Age: 61
End: 2021-01-19
Payer: COMMERCIAL

## 2021-01-19 VITALS
DIASTOLIC BLOOD PRESSURE: 78 MMHG | TEMPERATURE: 97.2 F | HEIGHT: 63 IN | OXYGEN SATURATION: 97 % | HEART RATE: 89 BPM | WEIGHT: 132.94 LBS | BODY MASS INDEX: 23.55 KG/M2 | SYSTOLIC BLOOD PRESSURE: 110 MMHG

## 2021-01-19 DIAGNOSIS — M62.838 MUSCLE SPASM: ICD-10-CM

## 2021-01-19 DIAGNOSIS — M54.17 LUMBOSACRAL RADICULOPATHY: ICD-10-CM

## 2021-01-19 DIAGNOSIS — M51.27 PROTRUSION OF INTERVERTEBRAL DISC OF LUMBOSACRAL REGION: ICD-10-CM

## 2021-01-19 PROCEDURE — 99214 OFFICE O/P EST MOD 30 MIN: CPT | Performed by: PHYSICAL MEDICINE & REHABILITATION

## 2021-01-19 RX ORDER — METHOCARBAMOL 500 MG/1
500 TABLET, FILM COATED ORAL 4 TIMES DAILY PRN
Qty: 40 TAB | Refills: 0 | Status: SHIPPED | OUTPATIENT
Start: 2021-01-19 | End: 2021-01-29

## 2021-01-19 RX ORDER — DULOXETIN HYDROCHLORIDE 60 MG/1
60 CAPSULE, DELAYED RELEASE ORAL
Qty: 30 CAP | Refills: 2 | Status: SHIPPED | OUTPATIENT
Start: 2021-01-19 | End: 2021-04-24

## 2021-01-19 ASSESSMENT — PAIN SCALES - GENERAL: PAINLEVEL: 2=MINIMAL-SLIGHT

## 2021-01-19 ASSESSMENT — PATIENT HEALTH QUESTIONNAIRE - PHQ9: CLINICAL INTERPRETATION OF PHQ2 SCORE: 0

## 2021-01-19 ASSESSMENT — FIBROSIS 4 INDEX: FIB4 SCORE: 0.82

## 2021-01-19 NOTE — PROGRESS NOTES
Follow-up patient note    Physiatry (physical medicine and  Rehabilitation), interventional spine and sports medicine    Date of Service: 1/19/2021    Chief complaint:   Chief Complaint   Patient presents with   • Follow-Up     Back and leg pain       HISTORY    HPI: Adelia Durant 60 y.o. female who presents today for evaluation of low back and right leg pain.  She was initially seen on 06/05/2020 and then was in [place], [place].    Since the last visit, she has had 5 visits of PT with Cynthia Yuen, PT, DPT.  She reports that she is doing well with her home exercises.    She continues to have some cramping in her right leg.  This is increased in the evening, she thinks.  It does seem like this decreases when she gets up and walks.  Pain is a 2/10 on the NRS.    She is taking methocarbamol every other day.    She has been taking gabapentin 100mg at bedtime.    No changes to bowel or bladder changes.      She has bee having more issues with headaches this week.  This is something that she has not had much trouble with for a while.     PHQ-9: 0    Medical records review:  I reviewed the note from the referring provider Elsie Pizarro A* dated 12/20/2019    Previous treatments:    Physical Therapy: Yes    Medications the patient is tried: NSAIDs, gabapentin and muscle relaxers    Previous interventions: injections at [facility], two epidurals    Previous surgeries to relieve the above pain:  none      ROS: Unchanged from 11/19/2020    Eyes: dizziness, sees ENT  ENT: ringing in her ears  Psych: depression  Red Flags ROS:   Fever, Chills, Sweats: Denies  Involuntary Weight Loss: Denies  Bladder Incontinence: Denies  Bowel Incontinence: Denies  Saddle Anesthesia: Denies    All other systems reviewed and negative.       PMHx:   Past Medical History:   Diagnosis Date   • Carcinoma in situ of cervix 1980   • Cataracts, bilateral    • Depression    • Family history of breast cancer in mother    • Family history of colon  cancer    • SAL (generalized anxiety disorder) 4/4/2017   • GERD (gastroesophageal reflux disease)     hiatal hernia, Dr. Ozuna   • Joint stiffness of hand    • Moderate episode of recurrent major depressive disorder (HCC) 11/30/2016   • Multiple gastric polyps 2008       PSHx:   Past Surgical History:   Procedure Laterality Date   • DILATION AND CURETTAGE CONE BIOPSY  1979    abnormal pap, carcinoma in situ   • CATARACT EXTRACTION WITH IOL      Dr. Finn   • COLONOSCOPY  2008, 4/2013    normal except for diverticulosis, Dr. Ozuna       Family history   Family History   Problem Relation Age of Onset   • Cancer Father 66        colon   • Alcohol abuse Father    • Bipolar disorder Father    • Cancer Mother 66        breast, lumpectomy   • Hypertension Mother    • Heart Disease Mother         enlarged heart   • GI Disease Mother         perforated diverticulitis, colostomy   • Stroke Maternal Grandmother    • Cancer Maternal Grandmother         ovarian and gastric   • Stroke Paternal Grandmother    • Heart Disease Maternal Uncle 42        heart demise   • Alcohol abuse Sister    • Alcohol abuse Brother    • Drug abuse Brother    • Suicide Attempts Child          Medications:   Current Outpatient Medications   Medication   • DULoxetine (CYMBALTA) 60 MG Cap DR Particles delayed-release capsule   • methocarbamol (ROBAXIN) 500 MG Tab   • Probiotic Product (PROBIOTIC-10 PO)   • triamcinolone acetonide (KENALOG) 0.1 % Ointment   • Cimetidine (TAGAMET PO)   • Loratadine (CLARITIN PO)   • Cholecalciferol (VITAMIN D-3) 1000 units Cap   • Omega-3 Fatty Acids (FISH OIL) 1000 MG Cap capsule   • Multiple Vitamins-Minerals (CENTRUM SILVER PO)   • Naproxen Sodium (ALEVE PO)   • Acetaminophen (TYLENOL PO)   • diphenhydrAMINE (BENADRYL) 25 MG Tab     No current facility-administered medications for this visit.        Allergies:   Allergies   Allergen Reactions   • Pcn [Penicillins]    • Tetanus Toxoid Hives and Rash     Body aches        Social Hx:   Social History     Socioeconomic History   • Marital status:      Spouse name: Not on file   • Number of children: Not on file   • Years of education: Not on file   • Highest education level: Not on file   Occupational History   • Not on file   Social Needs   • Financial resource strain: Not on file   • Food insecurity     Worry: Not on file     Inability: Not on file   • Transportation needs     Medical: Not on file     Non-medical: Not on file   Tobacco Use   • Smoking status: Former Smoker     Packs/day: 0.50     Years: 5.00     Pack years: 2.50     Types: Cigarettes     Quit date: 3/1/1983     Years since quittin.9   • Smokeless tobacco: Never Used   Substance and Sexual Activity   • Alcohol use: Yes     Alcohol/week: 3.0 oz     Types: 5 Standard drinks or equivalent per week     Frequency: Monthly or less   • Drug use: Yes     Types: Marijuana     Comment: cbd oil   • Sexual activity: Yes     Partners: Male     Comment: Occassionally   Lifestyle   • Physical activity     Days per week: Not on file     Minutes per session: Not on file   • Stress: Not on file   Relationships   • Social connections     Talks on phone: Not on file     Gets together: Not on file     Attends Protestant service: Not on file     Active member of club or organization: Not on file     Attends meetings of clubs or organizations: Not on file     Relationship status: Not on file   • Intimate partner violence     Fear of current or ex partner: Not on file     Emotionally abused: Not on file     Physically abused: Not on file     Forced sexual activity: Not on file   Other Topics Concern   •  Service No   • Blood Transfusions No   • Caffeine Concern No   • Occupational Exposure No   • Hobby Hazards No   • Sleep Concern Yes   • Stress Concern Yes   • Weight Concern No   • Special Diet No   • Back Care Yes   • Exercise Yes   • Bike Helmet No   • Seat Belt Yes   • Self-Exams No   Social History Narrative   •  "Not on file         EXAMINATION     Physical Exam:   Vitals: /78 (BP Location: Right arm, Patient Position: Sitting, BP Cuff Size: Small adult)   Pulse 89   Temp 36.2 °C (97.2 °F) (Temporal)   Ht 1.6 m (5' 3\")   Wt 60.3 kg (132 lb 15 oz)   SpO2 97%     Constitutional:   Body Habitus: Body mass index is 23.55 kg/m².  Cooperation: Fully cooperates with exam  Appearance: Well-groomed, well-nourished, not disheveled, in no acute distress    Eyes: No scleral icterus, no proptosis     ENT -no obvious auditory deficits, wearing a mask    Skin -no rashes or lesions noted     Respiratory-  breathing comfortable on room air, no audible wheezing    Cardiovascular- No lower extremity edema is noted.     Psychiatric- alert and oriented ×3. Normal affect.     Gait - normal gait, no use of ambulatory device, nonantalgic.     Musculoskeletal -     Thoracic/Lumbar Spine/Sacral Spine/Hips   Inspection: No evidence of atrophy in bilateral lower extremities throughout     ROM: Decreased AROM with flexion, extension, lateral flexion, and rotation bilaterally, with pain at end range.  There is note of prominence of thoracic paraspinals and mild scoliosis    Palpation:   No tenderness to palpation in the left and right of the midline T1-L5  palpation over SI joint: negative bilaterally    palpation over buttock: negative bilaterally    palpation in hip or over the greater trochanters: negative bilaterally      Lumbar spine Special tests  Neuro tension  Straight leg test positive right, negative left    HIP  Range of motion in the hips is within normal limits in internal and external ROM bilaterally    Neuro     Key points for the international standards for neurological classification of spinal cord injury (ISNCSCI) to light touch.     Dermatome R L   L2 2 2   L3 2 2   L4 2 2   L5 2 2   S1 2 2   S2 2 2       Motor Exam Lower Extremities    ? Myotome R L   Hip flexion L2 5 5   Knee extension L3 5 5   Ankle dorsiflexion L4 5 5 "   Toe extension L5 4 5   Ankle plantarflexion S1 5 5       Moy’s sign negative bilaterally   Clonus of the ankle negative bilaterally     Reflexes  ?  R L   Biceps  2+ 2+   Brachioradialis  2+ 2+   Patella  2+ 2+   Achilles   2+ 2+       MEDICAL DECISION MAKING    Medical records review: see under HPI section.     DATA    Labs:   Lab Results   Component Value Date/Time    SODIUM 140 06/06/2020 08:26 AM    POTASSIUM 4.8 06/06/2020 08:26 AM    CHLORIDE 101 06/06/2020 08:26 AM    CO2 27 06/06/2020 08:26 AM    ANION 12.0 06/06/2020 08:26 AM    GLUCOSE 92 06/06/2020 08:26 AM    BUN 14 06/06/2020 08:26 AM    CREATININE 0.80 06/06/2020 08:26 AM    CREATININE 0.76 03/26/2010 07:29 AM    CALCIUM 9.7 06/06/2020 08:26 AM    ASTSGOT 16 06/06/2020 08:26 AM    ALTSGPT 16 06/06/2020 08:26 AM    TBILIRUBIN 0.5 06/06/2020 08:26 AM    ALBUMIN 4.8 06/06/2020 08:26 AM    TOTPROTEIN 7.5 06/06/2020 08:26 AM    GLOBULIN 2.7 06/06/2020 08:26 AM    AGRATIO 1.8 06/06/2020 08:26 AM       No results found for: PROTHROMBTM, INR     Lab Results   Component Value Date/Time    WBC 3.7 (L) 06/06/2020 08:26 AM    WBC 5.4 03/26/2010 07:29 AM    RBC 4.29 06/06/2020 08:26 AM    RBC 4.43 03/26/2010 07:29 AM    HEMOGLOBIN 13.9 06/06/2020 08:26 AM    HEMATOCRIT 42.9 06/06/2020 08:26 AM    .0 (H) 06/06/2020 08:26 AM    MCV 96 03/26/2010 07:29 AM    MCH 32.4 06/06/2020 08:26 AM    MCH 31.8 03/26/2010 07:29 AM    MCHC 32.4 (L) 06/06/2020 08:26 AM    MPV 9.9 06/06/2020 08:26 AM    NEUTSPOLYS 48.20 06/06/2020 08:26 AM    LYMPHOCYTES 39.10 06/06/2020 08:26 AM    MONOCYTES 6.70 06/06/2020 08:26 AM    EOSINOPHILS 4.60 06/06/2020 08:26 AM    BASOPHILS 1.10 06/06/2020 08:26 AM        No results found for: HBA1C     Imaging: I personally reviewed following images, these are my reads  MRI lumbar spine 08/21/2019  At L5-S1, there is a right paracentral disc protrusion that causes right lateral recess stenosis and appears to abut the right S1 nerve root.   There is no central or foraminal canal stenosis  At L4-5, mild disc dessication.  There is a diffuse disc bulge without central or foraminal narrowing  At L3-4, mild disc dessication.  No central canal stenosis, mild left foraminal narrowing with left bulge with foraminal disc protrusion  At L2-3, mild diffuse disc bulge, no central or foraminal stenosis  At L1-2, no central or foraminal stenosis    IMAGING radiology reads. I reviewed the following radiology reads                                 Results for orders placed in visit on 08/21/19   MR-LUMBAR SPINE-W/O    Impression 1.  There is right paracentral disc protrusion at L5-S1 causing effacement of the right lateral recess. The disc is abutting the exiting right S1 nerve root at the lateral recess.  2.  Broad-based right foraminal disc protrusion at L3-4 causing mild stenosis of right L3 neural foramina.  3.  Degenerative disease as described above.                      Results for orders placed during the hospital encounter of 05/07/08   DX-CERVICAL SPINE-2 OR 3 VIEWS    Impression IMPRESSION:     1. NORMAL CERVICAL SPINE.    2. QUESTION OF THORACIC SCOLIOSIS.      MPW/llw      Read By ELENO NEVAREZ MD on May  7 2008 11:43AM  : [facility] Transcription Date: May  8 2008  2:45PM  THIS DOCUMENT HAS BEEN ELECTRONICALLY SIGNED BY: ELENO NEVAREZ MD on May    8 2008  4:28PM                                                         Results for orders placed in visit on 09/16/14   DX-PELVIS-1 OR 2 VIEWS    Impression Mild bilateral hip osteoarthritis and femoral head morphology could predispose to cam type impingement                                       Diagnosis   Visit Diagnoses     ICD-10-CM   1. Protrusion of intervertebral disc of lumbosacral region  M51.27   2. Lumbosacral radiculopathy  M54.17   3. Muscle spasm  M62.838         ASSESSMENT:  Adelia Durant 60 y.o. female seen for above     Adelia was seen today for follow-up.    Diagnoses and  all orders for this visit:    Protrusion of intervertebral disc of lumbosacral region  -     DULoxetine (CYMBALTA) 60 MG Cap DR Particles delayed-release capsule; Take 1 Cap by mouth every day for 30 days.    Lumbosacral radiculopathy  -     DULoxetine (CYMBALTA) 60 MG Cap DR Particles delayed-release capsule; Take 1 Cap by mouth every day for 30 days.    Muscle spasm  -     methocarbamol (ROBAXIN) 500 MG Tab; Take 1 Tab by mouth 4 times a day as needed for up to 10 days.         1. Continue duloxetine 60mg daily.  No side effects.  2. Discussed weaning methocarbamol as tolerated.  One refill for #40 pills given.  3. Discussed possible repeat TFESI.  She had a difficult time with the injections, we discussed that she was in a lot more pain at that time and getting on the table was painful.  She will continue with her home exercise program from PT.  4. Continue home exercise program as tolerated.      Outside records requested:  The patient signed outside records request form for her outside records including outside images.      Follow-up: Return in about 4 months (around 5/19/2021).    Thank you very much for asking me to participate in Adelia Durant's care.  Please contact me with any questions or concerns.      Please note that this dictation was created using voice recognition software. I have made every reasonable attempt to correct obvious errors but there may be errors of grammar and content that I may have overlooked prior to finalization of this note.      Danny King MD  Physical Medicine and Rehabilitation  Interventional Spine and Sports Physiatry  [facility]      Elsie Dueñas, [place]

## 2021-04-23 DIAGNOSIS — M51.27 PROTRUSION OF INTERVERTEBRAL DISC OF LUMBOSACRAL REGION: ICD-10-CM

## 2021-04-23 DIAGNOSIS — M54.17 LUMBOSACRAL RADICULOPATHY: ICD-10-CM

## 2021-04-24 RX ORDER — DULOXETIN HYDROCHLORIDE 60 MG/1
CAPSULE, DELAYED RELEASE ORAL
Qty: 30 CAPSULE | Refills: 2 | Status: SHIPPED | OUTPATIENT
Start: 2021-04-24 | End: 2021-05-18

## 2021-05-18 ENCOUNTER — OFFICE VISIT (OUTPATIENT)
Dept: PHYSICAL MEDICINE AND REHAB | Facility: MEDICAL CENTER | Age: 61
End: 2021-05-18
Payer: COMMERCIAL

## 2021-05-18 VITALS
TEMPERATURE: 97.9 F | OXYGEN SATURATION: 98 % | SYSTOLIC BLOOD PRESSURE: 140 MMHG | BODY MASS INDEX: 23.79 KG/M2 | HEART RATE: 90 BPM | WEIGHT: 134.26 LBS | HEIGHT: 63 IN | DIASTOLIC BLOOD PRESSURE: 86 MMHG

## 2021-05-18 DIAGNOSIS — M51.27 PROTRUSION OF INTERVERTEBRAL DISC OF LUMBOSACRAL REGION: ICD-10-CM

## 2021-05-18 DIAGNOSIS — M54.17 LUMBOSACRAL RADICULOPATHY: ICD-10-CM

## 2021-05-18 DIAGNOSIS — M51.36 DDD (DEGENERATIVE DISC DISEASE), LUMBAR: ICD-10-CM

## 2021-05-18 DIAGNOSIS — F43.21 SITUATIONAL DEPRESSION: ICD-10-CM

## 2021-05-18 PROCEDURE — 99214 OFFICE O/P EST MOD 30 MIN: CPT | Performed by: PHYSICAL MEDICINE & REHABILITATION

## 2021-05-18 RX ORDER — DULOXETIN HYDROCHLORIDE 30 MG/1
30 CAPSULE, DELAYED RELEASE ORAL DAILY
Qty: 30 CAPSULE | Refills: 1 | Status: SHIPPED | OUTPATIENT
Start: 2021-05-18 | End: 2021-07-07

## 2021-05-18 ASSESSMENT — PAIN SCALES - GENERAL: PAINLEVEL: 1=MINIMAL PAIN

## 2021-05-18 ASSESSMENT — PATIENT HEALTH QUESTIONNAIRE - PHQ9: CLINICAL INTERPRETATION OF PHQ2 SCORE: 0

## 2021-05-18 ASSESSMENT — FIBROSIS 4 INDEX: FIB4 SCORE: .8289473684210526316

## 2021-05-18 NOTE — PROGRESS NOTES
Follow-up patient note    Physiatry (physical medicine and  Rehabilitation), interventional spine and sports medicine    Date of Service: 05/18/2021    Chief complaint:   Chief Complaint   Patient presents with   • Follow-Up     Low back and right calf pain       HISTORY    HPI: Adelia Durant 61 y.o. female who presents today for evaluation of low back and right leg pain.  She was initially seen on 06/05/2020 and then was in [place], [place].    She feels like she is doing well.  Pain is much better.  She has completed physical therapy with  Cynthia Yuen, PT, DPT.  She has not been doing her exercises as regularly as she was travelling.     She does have some cramping in the right leg, but has stopped taking the gabapentin now.  The cramping seems worse if she sits for too long.  Pain 1/10 on the NRS.  Taking duloxetine 60mg daily.     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    No changes to bowel or bladder changes.       PHQ-9: 0    Medical records review:  I reviewed the note from the referring provider Elsie Pizarro A* dated 12/20/2019    Previous treatments:    Physical Therapy: Yes    Medications the patient is tried: NSAIDs, gabapentin and muscle relaxers    Previous interventions: injections at [facility], two epidurals    Previous surgeries to relieve the above pain:  none      ROS:   Red Flags ROS:   Fever, Chills, Sweats: Denies  Involuntary Weight Loss: Denies  Bladder Incontinence: Denies  Bowel Incontinence: Denies  Saddle Anesthesia: Denies    All other systems reviewed and negative.       PMHx:   Past Medical History:   Diagnosis Date   • Carcinoma in situ of cervix 1980   • Cataracts, bilateral    • Depression    • Family history of breast cancer in mother    • Family history of colon cancer    • SAL (generalized anxiety disorder) 4/4/2017   • GERD (gastroesophageal reflux disease)     hiatal hernia, Dr. Ozuna   • Joint stiffness of hand    • Moderate episode of recurrent major  depressive disorder (HCC) 11/30/2016   • Multiple gastric polyps 2008       PSHx:   Past Surgical History:   Procedure Laterality Date   • DILATION AND CURETTAGE CONE BIOPSY  1979    abnormal pap, carcinoma in situ   • CATARACT EXTRACTION WITH IOL      Dr. Finn   • COLONOSCOPY  2008, 4/2013    normal except for diverticulosis, Dr. Ozuna       Family history   Family History   Problem Relation Age of Onset   • Cancer Father 66        colon   • Alcohol abuse Father    • Bipolar disorder Father    • Cancer Mother 66        breast, lumpectomy   • Hypertension Mother    • Heart Disease Mother         enlarged heart   • GI Disease Mother         perforated diverticulitis, colostomy   • Stroke Maternal Grandmother    • Cancer Maternal Grandmother         ovarian and gastric   • Stroke Paternal Grandmother    • Heart Disease Maternal Uncle 42        heart demise   • Alcohol abuse Sister    • Alcohol abuse Brother    • Drug abuse Brother    • Suicide Attempts Child          Medications:   Current Outpatient Medications   Medication   • DULoxetine (CYMBALTA) 30 MG Cap DR Particles   • Probiotic Product (PROBIOTIC-10 PO)   • triamcinolone acetonide (KENALOG) 0.1 % Ointment   • Cimetidine (TAGAMET PO)   • Loratadine (CLARITIN PO)   • Cholecalciferol (VITAMIN D-3) 1000 units Cap   • Omega-3 Fatty Acids (FISH OIL) 1000 MG Cap capsule   • Multiple Vitamins-Minerals (CENTRUM SILVER PO)   • Naproxen Sodium (ALEVE PO)   • Acetaminophen (TYLENOL PO)   • diphenhydrAMINE (BENADRYL) 25 MG Tab     No current facility-administered medications for this visit.       Allergies:   Allergies   Allergen Reactions   • Pcn [Penicillins]    • Tetanus Toxoid Hives and Rash     Body aches       Social Hx:   Social History     Socioeconomic History   • Marital status:      Spouse name: Not on file   • Number of children: Not on file   • Years of education: Not on file   • Highest education level: Not on file   Occupational History   • Not on  file   Tobacco Use   • Smoking status: Former Smoker     Packs/day: 0.50     Years: 5.00     Pack years: 2.50     Types: Cigarettes     Quit date: 3/1/1983     Years since quittin.2   • Smokeless tobacco: Never Used   Vaping Use   • Vaping Use: Never used   Substance and Sexual Activity   • Alcohol use: Yes     Alcohol/week: 4.2 oz     Types: 7 Standard drinks or equivalent per week   • Drug use: Yes     Types: Marijuana     Comment: cbd oil   • Sexual activity: Yes     Partners: Male     Comment: Occassionally   Other Topics Concern   •  Service No   • Blood Transfusions No   • Caffeine Concern No   • Occupational Exposure No   • Hobby Hazards No   • Sleep Concern Yes   • Stress Concern Yes   • Weight Concern No   • Special Diet No   • Back Care Yes   • Exercise Yes   • Bike Helmet No   • Seat Belt Yes   • Self-Exams No   Social History Narrative   • Not on file     Social Determinants of Health     Financial Resource Strain:    • Difficulty of Paying Living Expenses:    Food Insecurity:    • Worried About Running Out of Food in the Last Year:    • Ran Out of Food in the Last Year:    Transportation Needs:    • Lack of Transportation (Medical):    • Lack of Transportation (Non-Medical):    Physical Activity:    • Days of Exercise per Week:    • Minutes of Exercise per Session:    Stress:    • Feeling of Stress :    Social Connections:    • Frequency of Communication with Friends and Family:    • Frequency of Social Gatherings with Friends and Family:    • Attends Yazidism Services:    • Active Member of Clubs or Organizations:    • Attends Club or Organization Meetings:    • Marital Status:    Intimate Partner Violence:    • Fear of Current or Ex-Partner:    • Emotionally Abused:    • Physically Abused:    • Sexually Abused:          EXAMINATION     Physical Exam:   Vitals: /86 (BP Location: Right arm, Patient Position: Sitting, BP Cuff Size: Small adult)   Pulse 90   Temp 36.6 °C (97.9 °F)  "(Temporal)   Ht 1.6 m (5' 3\")   Wt 60.9 kg (134 lb 4.2 oz)   SpO2 98%     Constitutional:   Body Habitus: Body mass index is 23.78 kg/m².  Cooperation: Fully cooperates with exam  Appearance: Well-groomed, well-nourished, not disheveled, in no acute distress    Eyes: No scleral icterus, no proptosis     ENT -no obvious auditory deficits, wearing a mask    Skin -no rashes or lesions noted     Respiratory-  breathing comfortable on room air, no audible wheezing    Cardiovascular- No lower extremity edema is noted.     Psychiatric- alert and oriented ×3. Normal affect.     Gait - normal gait, no use of ambulatory device, nonantalgic.     Musculoskeletal -     Thoracic/Lumbar Spine/Sacral Spine/Hips   Inspection: No evidence of atrophy in bilateral lower extremities throughout     ROM: Functional AROM with flexion, extension, lateral flexion, and rotation bilaterally, with pain at end range.      Palpation:   No tenderness to palpation in the left and right of the midline T1-L5    Lumbar spine Special tests  Neuro tension  Straight leg test bilaterally    HIP  Range of motion in the hips is within normal limits in internal and external ROM bilaterally    Neuro     Key points for the international standards for neurological classification of spinal cord injury (ISNCSCI) to light touch.     Dermatome R L   L2 2 2   L3 2 2   L4 2 2   L5 2 2   S1 2 2   S2 2 2       Motor Exam Lower Extremities    ? Myotome R L   Hip flexion L2 5 5   Knee extension L3 5 5   Ankle dorsiflexion L4 5 5   Toe extension L5 4+ 5   Ankle plantarflexion S1 5 5       Reflexes  ?  R L   Patella  2+ 2+   Achilles   2+ 2+       MEDICAL DECISION MAKING    Medical records review: see under HPI section.     DATA    Labs:   Lab Results   Component Value Date/Time    SODIUM 140 06/06/2020 08:26 AM    POTASSIUM 4.8 06/06/2020 08:26 AM    CHLORIDE 101 06/06/2020 08:26 AM    CO2 27 06/06/2020 08:26 AM    ANION 12.0 06/06/2020 08:26 AM    GLUCOSE 92 " 06/06/2020 08:26 AM    BUN 14 06/06/2020 08:26 AM    CREATININE 0.80 06/06/2020 08:26 AM    CREATININE 0.76 03/26/2010 07:29 AM    CALCIUM 9.7 06/06/2020 08:26 AM    ASTSGOT 16 06/06/2020 08:26 AM    ALTSGPT 16 06/06/2020 08:26 AM    TBILIRUBIN 0.5 06/06/2020 08:26 AM    ALBUMIN 4.8 06/06/2020 08:26 AM    TOTPROTEIN 7.5 06/06/2020 08:26 AM    GLOBULIN 2.7 06/06/2020 08:26 AM    AGRATIO 1.8 06/06/2020 08:26 AM       No results found for: PROTHROMBTM, INR     Lab Results   Component Value Date/Time    WBC 3.7 (L) 06/06/2020 08:26 AM    WBC 5.4 03/26/2010 07:29 AM    RBC 4.29 06/06/2020 08:26 AM    RBC 4.43 03/26/2010 07:29 AM    HEMOGLOBIN 13.9 06/06/2020 08:26 AM    HEMATOCRIT 42.9 06/06/2020 08:26 AM    .0 (H) 06/06/2020 08:26 AM    MCV 96 03/26/2010 07:29 AM    MCH 32.4 06/06/2020 08:26 AM    MCH 31.8 03/26/2010 07:29 AM    MCHC 32.4 (L) 06/06/2020 08:26 AM    MPV 9.9 06/06/2020 08:26 AM    NEUTSPOLYS 48.20 06/06/2020 08:26 AM    LYMPHOCYTES 39.10 06/06/2020 08:26 AM    MONOCYTES 6.70 06/06/2020 08:26 AM    EOSINOPHILS 4.60 06/06/2020 08:26 AM    BASOPHILS 1.10 06/06/2020 08:26 AM        No results found for: HBA1C     Imaging: I personally reviewed following images, these are my reads  MRI lumbar spine 08/21/2019  At L5-S1, there is a right paracentral disc protrusion that causes right lateral recess stenosis and appears to abut the right S1 nerve root.  There is no central or foraminal canal stenosis  At L4-5, mild disc dessication.  There is a diffuse disc bulge without central or foraminal narrowing  At L3-4, mild disc dessication.  No central canal stenosis, mild left foraminal narrowing with left bulge with foraminal disc protrusion  At L2-3, mild diffuse disc bulge, no central or foraminal stenosis  At L1-2, no central or foraminal stenosis    IMAGING radiology reads. I reviewed the following radiology reads                                 Results for orders placed in visit on 08/21/19   MR-LUMBAR  SPINE-W/O    Impression 1.  There is right paracentral disc protrusion at L5-S1 causing effacement of the right lateral recess. The disc is abutting the exiting right S1 nerve root at the lateral recess.  2.  Broad-based right foraminal disc protrusion at L3-4 causing mild stenosis of right L3 neural foramina.  3.  Degenerative disease as described above.                      Results for orders placed during the hospital encounter of 05/07/08   DX-CERVICAL SPINE-2 OR 3 VIEWS    Impression IMPRESSION:     1. NORMAL CERVICAL SPINE.    2. QUESTION OF THORACIC SCOLIOSIS.      MPW/llw      Read By ELENO NEVAREZ MD on May  7 2008 11:43AM  : [facility] Transcription Date: May  8 2008  2:45PM  THIS DOCUMENT HAS BEEN ELECTRONICALLY SIGNED BY: ELENO NEVAREZ MD on May    8 2008  4:28PM                                                         Results for orders placed in visit on 09/16/14   DX-PELVIS-1 OR 2 VIEWS    Impression Mild bilateral hip osteoarthritis and femoral head morphology could predispose to cam type impingement                                       Diagnosis   Visit Diagnoses     ICD-10-CM   1. Protrusion of intervertebral disc of lumbosacral region  M51.27   2. Lumbosacral radiculopathy  M54.17   3. DDD (degenerative disc disease), lumbar  M51.36   4. Situational depression  F43.21         ASSESSMENT:  Adelia Echevarria Bhargav 61 y.o. female seen for above     Adelia was seen today for follow-up.    Diagnoses and all orders for this visit:    Protrusion of intervertebral disc of lumbosacral region  -     DULoxetine (CYMBALTA) 30 MG Cap DR Particles; Take 1 capsule by mouth every day for 30 days.    Lumbosacral radiculopathy  -     DULoxetine (CYMBALTA) 30 MG Cap DR Particles; Take 1 capsule by mouth every day for 30 days.    DDD (degenerative disc disease), lumbar    Situational depression  Comments:  Improved       1. She has been able to discontinue the methocarbamol and gabapentin, not taking tylenol  or other OTCs  2. Discussed returning to her home exercise program.  3. Duloxetine 30mg for one month and then she can discontinue.  One refill given if she needs longer to wean or is unsure.  She will call if symptoms worsen or if she has difficulty weaning medications.  PHQ-9:0 today.  4. Continue home exercise program as tolerated.      Outside records requested:  The patient signed outside records request form for her outside records including outside images.      Follow-up: Return in about 3 months (around 8/18/2021).    Thank you very much for asking me to participate in Adelia Durant's care.  Please contact me with any questions or concerns.      Please note that this dictation was created using voice recognition software. I have made every reasonable attempt to correct obvious errors but there may be errors of grammar and content that I may have overlooked prior to finalization of this note.      Danny King MD  Physical Medicine and Rehabilitation  Interventional Spine and Sports Physiatry  [facility]      CC Elsie Pizarro, A

## 2021-07-03 DIAGNOSIS — M54.17 LUMBOSACRAL RADICULOPATHY: ICD-10-CM

## 2021-07-03 DIAGNOSIS — M51.27 PROTRUSION OF INTERVERTEBRAL DISC OF LUMBOSACRAL REGION: ICD-10-CM

## 2021-07-06 NOTE — TELEPHONE ENCOUNTER
Called this pt, Diana, to check on her need for a refill of duloxetine 30 mg tabs. Left message requesting call back at her convenience. ND

## 2021-07-07 ENCOUNTER — TELEPHONE (OUTPATIENT)
Dept: PHYSICAL MEDICINE AND REHAB | Facility: MEDICAL CENTER | Age: 61
End: 2021-07-07

## 2021-07-07 RX ORDER — DULOXETIN HYDROCHLORIDE 30 MG/1
CAPSULE, DELAYED RELEASE ORAL
Qty: 30 CAPSULE | Refills: 1 | Status: SHIPPED | OUTPATIENT
Start: 2021-07-07 | End: 2023-06-20

## 2021-07-27 ENCOUNTER — OFFICE VISIT (OUTPATIENT)
Dept: PHYSICAL MEDICINE AND REHAB | Facility: MEDICAL CENTER | Age: 61
End: 2021-07-27
Payer: COMMERCIAL

## 2021-07-27 VITALS
HEART RATE: 75 BPM | HEIGHT: 63 IN | TEMPERATURE: 97.7 F | OXYGEN SATURATION: 97 % | WEIGHT: 136.69 LBS | BODY MASS INDEX: 24.22 KG/M2 | SYSTOLIC BLOOD PRESSURE: 130 MMHG | DIASTOLIC BLOOD PRESSURE: 72 MMHG

## 2021-07-27 DIAGNOSIS — M51.27 PROTRUSION OF INTERVERTEBRAL DISC OF LUMBOSACRAL REGION: ICD-10-CM

## 2021-07-27 DIAGNOSIS — M51.36 DDD (DEGENERATIVE DISC DISEASE), LUMBAR: ICD-10-CM

## 2021-07-27 DIAGNOSIS — M54.17 LUMBOSACRAL RADICULOPATHY: ICD-10-CM

## 2021-07-27 PROCEDURE — 99214 OFFICE O/P EST MOD 30 MIN: CPT | Performed by: PHYSICAL MEDICINE & REHABILITATION

## 2021-07-27 ASSESSMENT — PATIENT HEALTH QUESTIONNAIRE - PHQ9
CLINICAL INTERPRETATION OF PHQ2 SCORE: 0
5. POOR APPETITE OR OVEREATING: 0 - NOT AT ALL

## 2021-07-27 ASSESSMENT — FIBROSIS 4 INDEX: FIB4 SCORE: .8289473684210526316

## 2021-07-27 ASSESSMENT — PAIN SCALES - GENERAL: PAINLEVEL: 2=MINIMAL-SLIGHT

## 2021-07-27 NOTE — PROGRESS NOTES
Follow-up patient note    Physiatry (physical medicine and  Rehabilitation), interventional spine and sports medicine    Date of Service: 07/27/2021    Chief complaint:   Chief Complaint   Patient presents with   • Follow-Up     Back Pain       HISTORY    HPI: Adelia Durant 61 y.o. female who presents today for evaluation of low back and right leg pain.  She was initially seen on 06/05/2020 and then was in [place], [place].    Since the last visit in May 2021, she has been able to reduce her duloxetine to 30mg every other day.      Pain is a 2/10 on the NRS.  Primarily axial back pain.  No radicular pain.    Doing home exercise program.    Taking dog for a walk about 15 minutes twice a day.    No changes to bowel or bladder changes.       PHQ-9: 0    Medical records review:  I reviewed the note from the referring provider Elsie Pizarro A* dated 12/20/2019    Previous treatments:    Physical Therapy: Yes    Medications the patient is tried: NSAIDs, gabapentin and muscle relaxers    Previous interventions: injections at [facility], two epidurals    Previous surgeries to relieve the above pain:  none      ROS:   Red Flags ROS:   Fever, Chills, Sweats: Denies  Involuntary Weight Loss: Denies  Bladder Incontinence: Denies  Bowel Incontinence: Denies  Saddle Anesthesia: Denies    All other systems reviewed and negative.       PMHx:   Past Medical History:   Diagnosis Date   • Carcinoma in situ of cervix 1980   • Cataracts, bilateral    • Depression    • Family history of breast cancer in mother    • Family history of colon cancer    • SAL (generalized anxiety disorder) 4/4/2017   • GERD (gastroesophageal reflux disease)     hiatal hernia, Dr. Ozuna   • Joint stiffness of hand    • Moderate episode of recurrent major depressive disorder (HCC) 11/30/2016   • Multiple gastric polyps 2008       PSHx:   Past Surgical History:   Procedure Laterality Date   • DILATION AND CURETTAGE CONE BIOPSY  1979    abnormal pap,  carcinoma in situ   • CATARACT EXTRACTION WITH IOL      Dr. Finn   • COLONOSCOPY  , 2013    normal except for diverticulosis, Dr. Ozuna       Family history   Family History   Problem Relation Age of Onset   • Cancer Father 66        colon   • Alcohol abuse Father    • Bipolar disorder Father    • Cancer Mother 66        breast, lumpectomy   • Hypertension Mother    • Heart Disease Mother         enlarged heart   • GI Disease Mother         perforated diverticulitis, colostomy   • Stroke Maternal Grandmother    • Cancer Maternal Grandmother         ovarian and gastric   • Stroke Paternal Grandmother    • Heart Disease Maternal Uncle 42        heart demise   • Alcohol abuse Sister    • Alcohol abuse Brother    • Drug abuse Brother    • Suicide Attempts Child          Medications:   Current Outpatient Medications   Medication   • DULoxetine (CYMBALTA) 30 MG Cap DR Particles   • Probiotic Product (PROBIOTIC-10 PO)   • triamcinolone acetonide (KENALOG) 0.1 % Ointment   • Cimetidine (TAGAMET PO)   • Loratadine (CLARITIN PO)   • Cholecalciferol (VITAMIN D-3) 1000 units Cap   • Omega-3 Fatty Acids (FISH OIL) 1000 MG Cap capsule   • Multiple Vitamins-Minerals (CENTRUM SILVER PO)   • Naproxen Sodium (ALEVE PO)   • Acetaminophen (TYLENOL PO)   • diphenhydrAMINE (BENADRYL) 25 MG Tab     No current facility-administered medications for this visit.       Allergies:   Allergies   Allergen Reactions   • Pcn [Penicillins]    • Tetanus Toxoid Hives and Rash     Body aches       Social Hx:   Social History     Socioeconomic History   • Marital status:      Spouse name: Not on file   • Number of children: Not on file   • Years of education: Not on file   • Highest education level: Not on file   Occupational History   • Not on file   Tobacco Use   • Smoking status: Former Smoker     Packs/day: 0.50     Years: 5.00     Pack years: 2.50     Types: Cigarettes     Quit date: 3/1/1983     Years since quittin.4   •  "Smokeless tobacco: Never Used   Vaping Use   • Vaping Use: Never used   Substance and Sexual Activity   • Alcohol use: Yes     Comment: 3 drinks /week   • Drug use: Yes     Types: Marijuana     Comment: cbd oil   • Sexual activity: Yes     Partners: Male     Comment: Occassionally   Other Topics Concern   •  Service No   • Blood Transfusions No   • Caffeine Concern No   • Occupational Exposure No   • Hobby Hazards No   • Sleep Concern Yes   • Stress Concern Yes   • Weight Concern No   • Special Diet No   • Back Care Yes   • Exercise Yes   • Bike Helmet No   • Seat Belt Yes   • Self-Exams No   Social History Narrative   • Not on file     Social Determinants of Health     Financial Resource Strain:    • Difficulty of Paying Living Expenses:    Food Insecurity:    • Worried About Running Out of Food in the Last Year:    • Ran Out of Food in the Last Year:    Transportation Needs:    • Lack of Transportation (Medical):    • Lack of Transportation (Non-Medical):    Physical Activity:    • Days of Exercise per Week:    • Minutes of Exercise per Session:    Stress:    • Feeling of Stress :    Social Connections:    • Frequency of Communication with Friends and Family:    • Frequency of Social Gatherings with Friends and Family:    • Attends [employer] Services:    • Active Member of Clubs or Organizations:    • Attends Club or Organization Meetings:    • Marital Status:    Intimate Partner Violence:    • Fear of Current or Ex-Partner:    • Emotionally Abused:    • Physically Abused:    • Sexually Abused:          EXAMINATION     Physical Exam:   Vitals: /72 (BP Location: Right arm, Patient Position: Sitting, BP Cuff Size: Small adult)   Pulse 75   Temp 36.5 °C (97.7 °F) (Temporal)   Ht 1.6 m (5' 3\")   Wt 62 kg (136 lb 11 oz)   SpO2 97%     Constitutional:   Body Habitus: Body mass index is 24.21 kg/m².  Cooperation: Fully cooperates with exam  Appearance: Well-groomed, well-nourished, not disheveled, in " no acute distress    Eyes: No scleral icterus, no proptosis     ENT -no obvious auditory deficits, wearing a mask    Skin -no rashes or lesions noted     Respiratory-  breathing comfortable on room air, no audible wheezing    Cardiovascular- No lower extremity edema is noted.     Psychiatric- alert and oriented ×3. Normal affect.     Gait - normal gait, no use of ambulatory device, nonantalgic.     Musculoskeletal -     Thoracic/Lumbar Spine/Sacral Spine/Hips   Inspection: No evidence of atrophy in bilateral lower extremities throughout     ROM: Functional AROM with flexion, extension, lateral flexion, and rotation bilaterally, with pain at end range.      Palpation:   No tenderness to palpation in the left and right of the midline T1-L5    Lumbar spine Special tests  Neuro tension  Straight leg test bilaterally    HIP  Range of motion in the hips is within normal limits in internal and external ROM bilaterally    Neuro     Key points for the international standards for neurological classification of spinal cord injury (ISNCSCI) to light touch.     Dermatome R L   L2 2 2   L3 2 2   L4 2 2   L5 2 2   S1 2 2   S2 2 2       Motor Exam Lower Extremities    ? Myotome R L   Hip flexion L2 5 5   Knee extension L3 5 5   Ankle dorsiflexion L4 5 5   Toe extension L5 4+ 5   Ankle plantarflexion S1 5 5       Reflexes  ?  R L   Patella  2+ 2+   Achilles   2+ 2+       MEDICAL DECISION MAKING    Medical records review: see under HPI section.     DATA    Labs:   Lab Results   Component Value Date/Time    SODIUM 140 06/06/2020 08:26 AM    POTASSIUM 4.8 06/06/2020 08:26 AM    CHLORIDE 101 06/06/2020 08:26 AM    CO2 27 06/06/2020 08:26 AM    ANION 12.0 06/06/2020 08:26 AM    GLUCOSE 92 06/06/2020 08:26 AM    BUN 14 06/06/2020 08:26 AM    CREATININE 0.80 06/06/2020 08:26 AM    CREATININE 0.76 03/26/2010 07:29 AM    CALCIUM 9.7 06/06/2020 08:26 AM    ASTSGOT 16 06/06/2020 08:26 AM    ALTSGPT 16 06/06/2020 08:26 AM    TBILIRUBIN 0.5  06/06/2020 08:26 AM    ALBUMIN 4.8 06/06/2020 08:26 AM    TOTPROTEIN 7.5 06/06/2020 08:26 AM    GLOBULIN 2.7 06/06/2020 08:26 AM    AGRATIO 1.8 06/06/2020 08:26 AM       No results found for: PROTHROMBTM, INR     Lab Results   Component Value Date/Time    WBC 3.7 (L) 06/06/2020 08:26 AM    WBC 5.4 03/26/2010 07:29 AM    RBC 4.29 06/06/2020 08:26 AM    RBC 4.43 03/26/2010 07:29 AM    HEMOGLOBIN 13.9 06/06/2020 08:26 AM    HEMATOCRIT 42.9 06/06/2020 08:26 AM    .0 (H) 06/06/2020 08:26 AM    MCV 96 03/26/2010 07:29 AM    MCH 32.4 06/06/2020 08:26 AM    MCH 31.8 03/26/2010 07:29 AM    MCHC 32.4 (L) 06/06/2020 08:26 AM    MPV 9.9 06/06/2020 08:26 AM    NEUTSPOLYS 48.20 06/06/2020 08:26 AM    LYMPHOCYTES 39.10 06/06/2020 08:26 AM    MONOCYTES 6.70 06/06/2020 08:26 AM    EOSINOPHILS 4.60 06/06/2020 08:26 AM    BASOPHILS 1.10 06/06/2020 08:26 AM        No results found for: HBA1C     Imaging: I personally reviewed following images, these are my reads  MRI lumbar spine 08/21/2019  At L5-S1, there is a right paracentral disc protrusion that causes right lateral recess stenosis and appears to abut the right S1 nerve root.  There is no central or foraminal canal stenosis  At L4-5, mild disc dessication.  There is a diffuse disc bulge without central or foraminal narrowing  At L3-4, mild disc dessication.  No central canal stenosis, mild left foraminal narrowing with left bulge with foraminal disc protrusion  At L2-3, mild diffuse disc bulge, no central or foraminal stenosis  At L1-2, no central or foraminal stenosis    IMAGING radiology reads. I reviewed the following radiology reads                                 Results for orders placed in visit on 08/21/19   MR-LUMBAR SPINE-W/O    Impression 1.  There is right paracentral disc protrusion at L5-S1 causing effacement of the right lateral recess. The disc is abutting the exiting right S1 nerve root at the lateral recess.  2.  Broad-based right foraminal disc protrusion  at L3-4 causing mild stenosis of right L3 neural foramina.  3.  Degenerative disease as described above.                      Results for orders placed during the hospital encounter of 05/07/08   DX-CERVICAL SPINE-2 OR 3 VIEWS    Impression IMPRESSION:     1. NORMAL CERVICAL SPINE.    2. QUESTION OF THORACIC SCOLIOSIS.      MPW/llw      Read By ELENO NEVAREZ MD on May  7 2008 11:43AM  : [facility] Transcription Date: May  8 2008  2:45PM  THIS DOCUMENT HAS BEEN ELECTRONICALLY SIGNED BY: ELENO NEVAREZ MD on May    8 2008  4:28PM                                                         Results for orders placed in visit on 09/16/14   DX-PELVIS-1 OR 2 VIEWS    Impression Mild bilateral hip osteoarthritis and femoral head morphology could predispose to cam type impingement                                       Diagnosis   Visit Diagnoses     ICD-10-CM   1. Protrusion of intervertebral disc of lumbosacral region  M51.27   2. Lumbosacral radiculopathy  M54.17   3. DDD (degenerative disc disease), lumbar  M51.36         ASSESSMENT:  Adelia Durant 61 y.o. female seen for above     Adelia was seen today for follow-up.    Diagnoses and all orders for this visit:    Protrusion of intervertebral disc of lumbosacral region    Lumbosacral radiculopathy    DDD (degenerative disc disease), lumbar         1. Encouraged her to work on weaning duloxetine further.   She continues to do well.  Discussed that she will discontinue.  2. She is now off all other medications.  3. Discussed returning to her home exercise program.  Discussed caution with returning to work that involves lifting.    Outside records requested:  The patient signed outside records request form for her outside records including outside images.      Follow-up: Return in about 3 months (around 10/27/2021), or if symptoms worsen or fail to improve.    Thank you very much for asking me to participate in Adelia Durant's care.  Please contact me  with any questions or concerns.      Please note that this dictation was created using voice recognition software. I have made every reasonable attempt to correct obvious errors but there may be errors of grammar and content that I may have overlooked prior to finalization of this note.      Danny King MD  Physical Medicine and Rehabilitation  Interventional Spine and Sports Physiatry  [facility]      CC Elsie Pizarro, A

## 2021-08-19 ENCOUNTER — HOSPITAL ENCOUNTER (OUTPATIENT)
Facility: MEDICAL CENTER | Age: 61
End: 2021-08-19
Attending: NURSE PRACTITIONER
Payer: COMMERCIAL

## 2021-08-19 ENCOUNTER — OFFICE VISIT (OUTPATIENT)
Dept: MEDICAL GROUP | Facility: PHYSICIAN GROUP | Age: 61
End: 2021-08-19
Payer: COMMERCIAL

## 2021-08-19 VITALS
DIASTOLIC BLOOD PRESSURE: 80 MMHG | BODY MASS INDEX: 21.86 KG/M2 | SYSTOLIC BLOOD PRESSURE: 126 MMHG | HEART RATE: 90 BPM | RESPIRATION RATE: 12 BRPM | OXYGEN SATURATION: 98 % | WEIGHT: 136 LBS | TEMPERATURE: 97.4 F | HEIGHT: 66 IN

## 2021-08-19 DIAGNOSIS — Z12.31 ENCOUNTER FOR SCREENING MAMMOGRAM FOR BREAST CANCER: ICD-10-CM

## 2021-08-19 DIAGNOSIS — Z00.00 WELLNESS EXAMINATION: ICD-10-CM

## 2021-08-19 DIAGNOSIS — Z11.51 SCREENING FOR HUMAN PAPILLOMAVIRUS: ICD-10-CM

## 2021-08-19 DIAGNOSIS — Z01.419 ENCOUNTER FOR GYNECOLOGICAL EXAMINATION: ICD-10-CM

## 2021-08-19 DIAGNOSIS — Z12.4 SCREENING FOR CERVICAL CANCER: ICD-10-CM

## 2021-08-19 DIAGNOSIS — Z23 NEED FOR VACCINATION: ICD-10-CM

## 2021-08-19 PROCEDURE — 99396 PREV VISIT EST AGE 40-64: CPT | Mod: 25 | Performed by: NURSE PRACTITIONER

## 2021-08-19 PROCEDURE — 90750 HZV VACC RECOMBINANT IM: CPT | Performed by: NURSE PRACTITIONER

## 2021-08-19 PROCEDURE — 88175 CYTOPATH C/V AUTO FLUID REDO: CPT

## 2021-08-19 PROCEDURE — 90471 IMMUNIZATION ADMIN: CPT | Performed by: NURSE PRACTITIONER

## 2021-08-19 PROCEDURE — 87624 HPV HI-RISK TYP POOLED RSLT: CPT

## 2021-08-19 ASSESSMENT — FIBROSIS 4 INDEX: FIB4 SCORE: .8289473684210526316

## 2021-08-19 NOTE — PROGRESS NOTES
Subjective  Chief Complaint  Chief Complaint   Patient presents with   • Gynecologic Exam     History of Present Illness  Adelia Durant is a 61 y.o. female who presents for annual exam. She is feeling well and denies any complaints.    Ob-Gyn/ History:    Patient has GYN provider: no  /Para:  2/1  Last Pap Smear:  2017. Yes history of abnormal pap smears around .  Cervical carcinoma in situ.   Gyn Surgery:  D&C cone biopsy .  No significant bloating/fluid retention, pelvic pain, or dyspareunia. No vaginal discharge  Post-menopausal bleeding: None.   Urinary incontinence: None.   Folate intake: N/A.      Health Maintenance  Advanced directive: N/A - consider at age 65.   Osteoporosis Screen/ DEXA: N/A - consider at age 65.   PT/vit D for falls prevention: N/A - consider at age 65.   Cholesterol Screening:  Order today - last 2020.   Diabetes Screening: Order today - last 2020.   Aspirin Use:  Recommended to start.    Diet: Advised continue heart healthy, low sodium diet.   Exercise: Advised exercise at least 30 minutes a day most days of the week.   Substance Abuse: N/A.   Safe in relationship.  Seat belts, bike helmet, gun safety discussed.  Sun protection used.    Cancer screening  Colorectal Cancer Screening:  Done 2018.    Lung Cancer Screening: Quit in ; pack years 2.5.   Cervical Cancer Screening: Complete today.    Breast Cancer Screening: Order today.     Infectious disease screening/Immunizations  --STI Screening: Deferred.   --Practices safe sex.  --HIV Screening: Deferred.   --Hepatitis C Screening: Done 2020.   --Immunizations:    Influenza: Out of season.    HPV:  Aged out.    Tetanus: Declines.    Shingles: Complete 2nd dose shingles today.    Pneumococcal :  N/A - consider at age 65.    Other immunizations:  None     Past Medical History    Allergies: Pcn [penicillins] and Tetanus toxoid  Past Medical History:   Diagnosis Date   • Carcinoma in situ of cervix  1980   • Cataracts, bilateral    • Depression    • Family history of breast cancer in mother    • Family history of colon cancer    • SAL (generalized anxiety disorder) 4/4/2017   • GERD (gastroesophageal reflux disease)     hiatal hernia, Dr. Ozuna   • Joint stiffness of hand    • Moderate episode of recurrent major depressive disorder (HCC) 11/30/2016   • Multiple gastric polyps 2008     Past Surgical History:   Procedure Laterality Date   • DILATION AND CURETTAGE CONE BIOPSY  1979    abnormal pap, carcinoma in situ   • CATARACT EXTRACTION WITH IOL      Dr. Finn   • COLONOSCOPY  2008, 4/2013    normal except for diverticulosis, Dr. Ozuna     Current Outpatient Medications Ordered in Epic   Medication Sig Dispense Refill   • DULoxetine (CYMBALTA) 30 MG Cap DR Particles TAKE 1 CAPSULE BY MOUTH EVERY DAY 30 capsule 1   • Probiotic Product (PROBIOTIC-10 PO) Take  by mouth.     • triamcinolone acetonide (KENALOG) 0.1 % Ointment Apply 1 g topically 2 times a day. 30 g 0   • Cimetidine (TAGAMET PO) Take  by mouth.     • Loratadine (CLARITIN PO) Take  by mouth.     • Cholecalciferol (VITAMIN D-3) 1000 units Cap Take 1 Cap by mouth every day. 30 Cap 11   • Omega-3 Fatty Acids (FISH OIL) 1000 MG Cap capsule Take 1,000 mg by mouth 3 times a day, with meals.     • Multiple Vitamins-Minerals (CENTRUM SILVER PO) Take  by mouth.       No current Epic-ordered facility-administered medications on file.     Family History:    Family History   Problem Relation Age of Onset   • Cancer Father 66        colon   • Alcohol abuse Father    • Bipolar disorder Father    • Cancer Mother 66        breast, lumpectomy   • Hypertension Mother    • Heart Disease Mother         enlarged heart   • GI Disease Mother         perforated diverticulitis, colostomy   • Stroke Maternal Grandmother    • Cancer Maternal Grandmother         ovarian and gastric   • Stroke Paternal Grandmother    • Heart Disease Maternal Uncle 42        heart demise   •  "Alcohol abuse Sister    • Alcohol abuse Brother    • Drug abuse Brother    • Suicide Attempts Child       Personal/Social History:    Social History     Tobacco Use   • Smoking status: Former Smoker     Packs/day: 0.50     Years: 5.00     Pack years: 2.50     Types: Cigarettes     Quit date: 3/1/1983     Years since quittin.4   • Smokeless tobacco: Never Used   Vaping Use   • Vaping Use: Never used   Substance Use Topics   • Alcohol use: Yes     Comment: 3 drinks /week   • Drug use: Yes     Types: Marijuana     Comment: cbd oil     Social History     Social History Narrative   • Not on file     Review of Systems  General: Negative for fever/chills and expected weight change.  Eyes:  Negative for vision changes, eye pain.  ENT:  Negative for hearing changes, ear pain, congestion, sore throat, and neck pain.   Respiratory:  Negative for cough and dyspnea.    Cardiovascular:  Negative for chest pain and palpitations.  Gastrointestinal:  Negative for nausea/vomiting, changes in bowel habits, and abdominal pain.   Genitourinary:  Negative for dysuria and hematuria.   Musculoskeletal:  Negative for myalgias.   Skin:  Negative for rash.   Heme/Lymph:  Does not bruise/bleed easily.   Neurological:  Negative for numbness/tingling and headaches.   Psychiatric/Behavioral:  Negative for depression.  The patient is not nervous/anxious.      Objective  Physical Exam:   /80 (BP Location: Left arm, Patient Position: Sitting, BP Cuff Size: Adult)   Pulse 90   Temp 36.3 °C (97.4 °F) (Temporal)   Resp 12   Ht 1.676 m (5' 6\")   Wt 61.7 kg (136 lb)   SpO2 98%  Body mass index is 21.95 kg/m².  Wt Readings from Last 4 Encounters:   21 61.7 kg (136 lb)   21 62 kg (136 lb 11 oz)   21 60.9 kg (134 lb 4.2 oz)   21 60.3 kg (132 lb 15 oz)     Constitutional: Well-developed and well-nourished. Not diaphoretic. No distress.   Skin: Skin is warm and dry. No rash noted.  Head: Atraumatic without " lesions.  Eyes: Conjunctivae and extraocular motions are normal. Pupils are equal, round, and reactive to light. No scleral icterus.   Ears:  External ears unremarkable. Tympanic membranes clear and intact.  Nose: Nares patent. Septum midline. Turbinates without erythema nor edema. No discharge.   Mouth/Throat: Dentition is good. Tongue normal. Oropharynx is clear and moist. Posterior pharynx without erythema or exudates.  Neck: Supple, trachea midline. Normal range of motion. No thyromegaly present. No lymphadenopathy--cervical or supraclavicular.  Cardiovascular: Regular rate and rhythm, S1 and S2 without murmur, rubs, or gallops.  Lungs: Normal inspiratory effort, CTA bilaterally, no wheezes/rhonchi/rales  Breast: Deferred.   Abdomen: Soft, non tender, and without distention. Active bowel sounds in all four quadrants. No rebound, guarding, masses or HSM.  :Perineum and external genitalia normal without rash. Vagina with normal and physiologic discharge. Cervix without visible lesions or discharge. Bimanual exam without adnexal masses or cervical motion tenderness.  Extremities: No cyanosis, clubbing, erythema, nor edema. Distal pulses intact and symmetric.   Musculoskeletal: All major joints AROM full in all directions without pain.  Neurologic: Grossly intact.  DTRs 2+/3 and symmetric.  No cranial nerve deficit.  Sensation intact.   Psychiatric:  Behavior, mood, and affect are appropriate.    A chaperone was offered to the patient during today's exam. Chaperone name: Alireza (MA) was present.    Assessment/Plan  1. Encounter for gynecological examination  THINPREP PAP WITH HPV   2. Screening for cervical cancer  THINPREP PAP WITH HPV   3. Screening for human papillomavirus  THINPREP PAP WITH HPV   4. Encounter for screening mammogram for breast cancer  MA-SCREENING MAMMO BILAT W/TOMOSYNTHESIS W/CAD   5. Wellness examination  CBC WITH DIFFERENTIAL    Comp Metabolic Panel    Lipid Profile    VITAMIN D,25 HYDROXY    6. Need for vaccination  Shingles Vaccine (Shingrix)     Health Maintenance: Completed  Labs per orders.  Immunizations per orders.  Patient counseled about skin care, diet, supplements, prenatal vitamins, safe sex and exercise.    Follow-up: Return if symptoms worsen or fail to improve, for will need to establish with new PCP.    I have placed the above orders and discussed them with an approved delegating provider.  The MA is performing the below orders under the direction of Dr. Veloz.     Please note that this dictation was created using voice recognition software. I have made every reasonable attempt to correct obvious errors, but I expect that there are errors of grammar and possibly content that I did not discover before finalizing the note.    PRASAD Mendiola  [facility]

## 2021-08-21 LAB
CYTOLOGY REG CYTOL: NORMAL
HPV HR 12 DNA CVX QL NAA+PROBE: NEGATIVE
HPV16 DNA SPEC QL NAA+PROBE: NEGATIVE
HPV18 DNA SPEC QL NAA+PROBE: NEGATIVE
SPECIMEN SOURCE: NORMAL

## 2021-09-14 ENCOUNTER — HOSPITAL ENCOUNTER (OUTPATIENT)
Dept: RADIOLOGY | Facility: MEDICAL CENTER | Age: 61
End: 2021-09-14
Attending: NURSE PRACTITIONER
Payer: COMMERCIAL

## 2021-09-14 DIAGNOSIS — Z12.31 ENCOUNTER FOR SCREENING MAMMOGRAM FOR BREAST CANCER: ICD-10-CM

## 2021-09-14 PROCEDURE — 77063 BREAST TOMOSYNTHESIS BI: CPT

## 2022-09-27 ENCOUNTER — NON-PROVIDER VISIT (OUTPATIENT)
Dept: OCCUPATIONAL MEDICINE | Facility: CLINIC | Age: 62
End: 2022-09-27

## 2022-09-27 DIAGNOSIS — Z11.1 ENCOUNTER FOR PPD TEST: ICD-10-CM

## 2022-09-27 PROCEDURE — 86580 TB INTRADERMAL TEST: CPT | Performed by: NURSE PRACTITIONER

## 2022-09-29 ENCOUNTER — NON-PROVIDER VISIT (OUTPATIENT)
Dept: OCCUPATIONAL MEDICINE | Facility: CLINIC | Age: 62
End: 2022-09-29

## 2022-09-29 DIAGNOSIS — Z11.1 ENCOUNTER FOR PPD SKIN TEST READING: ICD-10-CM

## 2022-09-29 LAB — TB WHEAL 3D P 5 TU DIAM: NORMAL MM

## 2022-12-07 NOTE — MR AVS SNAPSHOT
"Adelia Durant   2/10/2017 3:10 PM   Office Visit   MRN: [id number]    Department:  [facility]   Dept Phone:  [phone]    Description:  Female : 1960   Provider:  Zachary Zavala PA-C           Reason for Visit     Sinus Problem x2 weeks. Cough, sinus congestion, ear fullness, epistaxis.      Allergies as of 2/10/2017     Allergen Noted Reactions    Pcn [Penicillins] 2010       Tetanus Toxoid 2015   Hives, Rash    Body aches      You were diagnosed with     Acute non-recurrent maxillary sinusitis   [9035286]       Nasal sore   [798874]       Vitamin D deficiency   [8790935]         Vital Signs     Blood Pressure Pulse Temperature Respirations Height Weight    146/80 mmHg 80 36.6 °C (97.9 °F) 20 1.6 m (5' 3\") 60.328 kg (133 lb)    Body Mass Index Oxygen Saturation Last Menstrual Period Breastfeeding? Smoking Status       23.57 kg/m2 98% 2006 No Former Smoker       Basic Information     Date Of Birth Sex Race Ethnicity Preferred Language    1960 Female White Non- English      Your appointments     2017  9:00 AM   Individual Therapy with [facility]    [address]  [address]  [place] [address]   [phone]            Mar 15, 2017  2:00 PM   Individual Therapy with [facility] ([facility]    [address]  [address]  [place] [address]   [phone]            2017  9:00 AM   Individual Therapy with [facility] ([facility]    [address]  [address]  [place] [address]   [phone]              Problem List              ICD-10-CM Priority Class Noted - Resolved    History of carcinoma in situ of cervix uteri Z86.008   2011 - Present    Family history of breast cancer in mother Z80.3   Unknown - Present    GERD (gastroesophageal reflux disease) K21.9   2015 - Present    Sleep deficient Z72.820   2016 - Present    Depression F32.9 "   11/8/2016 - Present    Moderate episode of recurrent major depressive disorder (CMS-HCC) F33.1   11/30/2016 - Present      Health Maintenance        Date Due Completion Dates    IMM DTaP/Tdap/Td Vaccine (1 - Tdap) 5/17/1977 ---    MAMMOGRAM 9/3/2016 9/3/2015, 4/28/2014, 9/24/2012, 8/11/2011, 8/10/2011 (Prv Comp), 6/18/2010, 6/18/2010, 3/17/2009, 3/17/2009, 2/6/2008, 2/6/2008, 9/23/2005    Override on 8/10/2011: Previously completed    COLONOSCOPY 4/4/2018 4/4/2013 (Prv Comp), 3/31/2011 (Prv Comp), 7/10/2008 (Prv Comp)    Override on 4/4/2013: Previously completed (diverticulosis)    Override on 3/31/2011: Previously completed (done 2008, due 2013)    Override on 7/10/2008: Previously completed    PAP SMEAR 1/16/2019 1/16/2017, 8/24/2015, 8/6/2015 (Postponed), 4/18/2014, 9/18/2012 (Prv Comp), 9/10/2012, 7/27/2011, 6/4/2010    Override on 8/6/2015: Postponed    Override on 9/18/2012: Previously completed            Current Immunizations     Hepatitis B Vaccine Non-Recombivax (Ped/Adol) 2/16/2015, 10/20/2014, 8/12/2014    Influenza TIV (IM) 10/8/2016    Influenza Vaccine Pediatric 10/10/2009    Influenza Vaccine Quad Inj (Pf) 10/31/2015    Tuberculin Skin Test 4/13/2014  9:30 AM, 4/3/2014      Below and/or attached are the medications your provider expects you to take. Review all of your home medications and newly ordered medications with your provider and/or pharmacist. Follow medication instructions as directed by your provider and/or pharmacist. Please keep your medication list with you and share with your provider. Update the information when medications are discontinued, doses are changed, or new medications (including over-the-counter products) are added; and carry medication information at all times in the event of emergency situations     Allergies:  PCN - (reactions not documented)     TETANUS TOXOID - Hives,Rash               Medications  Valid as of: February 10, 2017 -  3:50 PM    Generic Name Brand Name  Tablet Size Instructions for use    Azithromycin (Tab) ZITHROMAX 250 MG Z-stephen, Use as directed.        Multiple Vitamins-Minerals   Take  by mouth.        Multiple Vitamins-Minerals (Tab) MULTI FOR HER 50+  Take  by mouth.        Mupirocin (Ointment) BACTROBAN 2 % Apply 1 Application to affected area(s) 2 times a day.        Omega-3 Fatty Acids (Cap) fish oil 1000 MG Take 1,000 mg by mouth 3 times a day, with meals.        RaNITidine HCl (Tab) ZANTAC 150 MG Take 1 Tab by mouth 2 times a day.        .                 Medicines prescribed today were sent to:     [id number] - [place] - [address] [place] [address]    Phone: [phone] Fax: [phone]    Open 24 Hours?: No      Medication refill instructions:       If your prescription bottle indicates you have medication refills left, it is not necessary to call your provider’s office. Please contact your pharmacy and they will refill your medication.    If your prescription bottle indicates you do not have any refills left, you may request refills at any time through one of the following ways: The online Touch Payments system (except Urgent Care), by calling your provider’s office, or by asking your pharmacy to contact your provider’s office with a refill request. Medication refills are processed only during regular business hours and may not be available until the next business day. Your provider may request additional information or to have a follow-up visit with you prior to refilling your medication.   *Please Note: Medication refills are assigned a new Rx number when refilled electronically. Your pharmacy may indicate that no refills were authorized even though a new prescription for the same medication is available at the pharmacy. Please request the medicine by name with the pharmacy before contacting your provider for a refill.           Touch Payments Access Code: Activation code not generated  Current Touch Payments Status: Active           MD Office

## 2023-04-03 ENCOUNTER — TELEPHONE (OUTPATIENT)
Dept: HEALTH INFORMATION MANAGEMENT | Facility: OTHER | Age: 63
End: 2023-04-03

## 2023-06-07 ENCOUNTER — TELEPHONE (OUTPATIENT)
Dept: MEDICAL GROUP | Facility: PHYSICIAN GROUP | Age: 63
End: 2023-06-07
Payer: MEDICARE

## 2023-06-07 NOTE — TELEPHONE ENCOUNTER
Phone Number Called: [phone]    Call outcome: Left detailed message for patient. Informed to call back with any additional questions.    Message: called patient to reschedule appointment for 06/08/2023 provider will be out of office if patient calls back please reschedule for another day. 1st call

## 2023-06-20 ENCOUNTER — OFFICE VISIT (OUTPATIENT)
Dept: MEDICAL GROUP | Facility: PHYSICIAN GROUP | Age: 63
End: 2023-06-20
Payer: MEDICARE

## 2023-06-20 VITALS
RESPIRATION RATE: 16 BRPM | HEART RATE: 96 BPM | SYSTOLIC BLOOD PRESSURE: 104 MMHG | TEMPERATURE: 97.4 F | BODY MASS INDEX: 24.41 KG/M2 | OXYGEN SATURATION: 97 % | WEIGHT: 143 LBS | HEIGHT: 64 IN | DIASTOLIC BLOOD PRESSURE: 70 MMHG

## 2023-06-20 DIAGNOSIS — G89.29 CHRONIC RIGHT-SIDED LOW BACK PAIN WITH RIGHT-SIDED SCIATICA: Chronic | ICD-10-CM

## 2023-06-20 DIAGNOSIS — K21.9 GASTROESOPHAGEAL REFLUX DISEASE WITHOUT ESOPHAGITIS: Chronic | ICD-10-CM

## 2023-06-20 DIAGNOSIS — Z23 NEED FOR VACCINATION: ICD-10-CM

## 2023-06-20 DIAGNOSIS — Z13.6 SCREENING FOR CARDIOVASCULAR CONDITION: ICD-10-CM

## 2023-06-20 DIAGNOSIS — N95.1 MENOPAUSAL STATE: ICD-10-CM

## 2023-06-20 DIAGNOSIS — M54.41 CHRONIC RIGHT-SIDED LOW BACK PAIN WITH RIGHT-SIDED SCIATICA: Chronic | ICD-10-CM

## 2023-06-20 DIAGNOSIS — Z12.11 COLON CANCER SCREENING: ICD-10-CM

## 2023-06-20 DIAGNOSIS — Z13.0 SCREENING FOR DEFICIENCY ANEMIA: ICD-10-CM

## 2023-06-20 DIAGNOSIS — E78.00 PURE HYPERCHOLESTEROLEMIA: ICD-10-CM

## 2023-06-20 DIAGNOSIS — Z13.29 THYROID DISORDER SCREEN: ICD-10-CM

## 2023-06-20 DIAGNOSIS — Z78.0 POSTMENOPAUSAL STATUS (AGE-RELATED) (NATURAL): ICD-10-CM

## 2023-06-20 DIAGNOSIS — Z13.79 GENETIC SCREENING: ICD-10-CM

## 2023-06-20 DIAGNOSIS — F41.1 GENERALIZED ANXIETY DISORDER: ICD-10-CM

## 2023-06-20 DIAGNOSIS — Z12.31 ENCOUNTER FOR SCREENING MAMMOGRAM FOR MALIGNANT NEOPLASM OF BREAST: ICD-10-CM

## 2023-06-20 DIAGNOSIS — E55.9 VITAMIN D DEFICIENCY: ICD-10-CM

## 2023-06-20 PROCEDURE — 90732 PPSV23 VACC 2 YRS+ SUBQ/IM: CPT

## 2023-06-20 PROCEDURE — G0009 ADMIN PNEUMOCOCCAL VACCINE: HCPCS

## 2023-06-20 PROCEDURE — 3074F SYST BP LT 130 MM HG: CPT

## 2023-06-20 PROCEDURE — 99397 PER PM REEVAL EST PAT 65+ YR: CPT | Mod: 25

## 2023-06-20 PROCEDURE — 3078F DIAST BP <80 MM HG: CPT

## 2023-06-20 ASSESSMENT — ENCOUNTER SYMPTOMS: BACK PAIN: 1

## 2023-06-20 ASSESSMENT — PATIENT HEALTH QUESTIONNAIRE - PHQ9: CLINICAL INTERPRETATION OF PHQ2 SCORE: 0

## 2023-06-20 NOTE — ASSESSMENT & PLAN NOTE
Chronic, unstable. Ongoing back pain. Has been seen by Dr. King for pain management in the past which was helpful requesting new referral.

## 2023-06-20 NOTE — ASSESSMENT & PLAN NOTE
Chronic, stable.  Previously improved symptoms with Cymbalta 30 mg which was provided for depression, anxiety and back pain with sufficient relief.  Would like to reevaluate with Dr. King.  Not currently taking this medication

## 2023-06-20 NOTE — PROGRESS NOTES
"Subjective:     CC: Diagnoses of Colon cancer screening, Encounter for screening mammogram for malignant neoplasm of breast, Postmenopausal status (age-related) (natural), Menopausal state, Need for vaccination, Genetic screening, Gastroesophageal reflux disease without esophagitis, Chronic right-sided low back pain with right-sided sciatica, SAL (generalized anxiety disorder), Pure hypercholesterolemia, Screening for cardiovascular condition, Screening for deficiency anemia, Thyroid disorder screen, and Vitamin D deficiency were pertinent to this visit.    Pt presents to establish care with me, prior PCP Umm Kirkpatrick. No concerns specifically today, but received notice from GI see that she is due for repeat colonoscopy, would like to get repeat mammogram as she has a strong family history of breast cancer.    HPI:   YASH presents today with    Problem   Chronic Right-Sided Low Back Pain With Right-Sided Sciatica   SAL (generalized anxiety disorder)    IMO load March 2020     Gerd (Gastroesophageal Reflux Disease)     ROS:  Review of Systems   Musculoskeletal:  Positive for back pain and joint pain (hip-left).   All other systems reviewed and are negative.      Objective:     Exam:  /70 (BP Location: Left arm, Patient Position: Sitting, BP Cuff Size: Small adult)   Pulse 96   Temp 36.3 °C (97.4 °F) (Temporal)   Resp 16   Ht 1.63 m (5' 4.17\")   Wt 64.9 kg (143 lb)   LMP 07/27/2006   SpO2 97%   BMI 24.41 kg/m²  Body mass index is 24.41 kg/m².    Physical Exam  Vitals reviewed.   Constitutional:       General: She is not in acute distress.     Appearance: Normal appearance. She is well-groomed. She is not ill-appearing.   HENT:      Head: Normocephalic and atraumatic.      Right Ear: Tympanic membrane, ear canal and external ear normal.      Left Ear: Tympanic membrane, ear canal and external ear normal.      Nose: Nose normal.      Mouth/Throat:      Mouth: Mucous membranes are moist.      Pharynx: " Oropharynx is clear.   Eyes:      Extraocular Movements: Extraocular movements intact.      Conjunctiva/sclera: Conjunctivae normal.      Pupils: Pupils are equal, round, and reactive to light.   Neck:      Thyroid: No thyromegaly.      Trachea: Trachea normal.   Cardiovascular:      Rate and Rhythm: Normal rate and regular rhythm.      Pulses: Normal pulses.      Heart sounds: Normal heart sounds. No murmur heard.  Pulmonary:      Effort: Pulmonary effort is normal. No respiratory distress.      Breath sounds: Normal breath sounds. No wheezing.   Abdominal:      General: Bowel sounds are normal.   Musculoskeletal:         General: No swelling, tenderness or deformity. Normal range of motion.      Cervical back: Full passive range of motion without pain.   Lymphadenopathy:      Cervical: No cervical adenopathy.   Skin:     General: Skin is warm and dry.      Capillary Refill: Capillary refill takes less than 2 seconds.   Neurological:      General: No focal deficit present.      Mental Status: She is alert and oriented to person, place, and time. Mental status is at baseline.      Cranial Nerves: No cranial nerve deficit.      Sensory: No sensory deficit.      Motor: No weakness.   Psychiatric:         Mood and Affect: Mood normal.         Behavior: Behavior normal.       Assessment & Plan:     63 y.o. female with the following -     Problem List Items Addressed This Visit       GERD (gastroesophageal reflux disease) (Chronic)     Chronic, stable. Taking cimetidine for this daily.          Chronic right-sided low back pain with right-sided sciatica (Chronic)     Chronic, unstable. Ongoing back pain. Has been seen by Dr. King for pain management in the past which was helpful requesting new referral.         Relevant Orders    Referral to Pain Management    SAL (generalized anxiety disorder)     Chronic, stable.  Previously improved symptoms with Cymbalta 30 mg which was provided for depression, anxiety and back pain  with sufficient relief.  Would like to reevaluate with Dr. King.  Not currently taking this medication         Vitamin D deficiency    Relevant Orders    VITAMIN D,25 HYDROXY (DEFICIENCY)     Other Visit Diagnoses       Colon cancer screening        Relevant Orders    Referral to GI for Colonoscopy    Encounter for screening mammogram for malignant neoplasm of breast        Relevant Orders    MA-SCREENING MAMMO BILAT W/TOMOSYNTHESIS W/CAD    Postmenopausal status (age-related) (natural)        Relevant Orders    DS-BONE DENSITY STUDY (DEXA)    Menopausal state        Relevant Orders    DS-BONE DENSITY STUDY (DEXA)    Need for vaccination        Relevant Orders    Pneumovax Vaccine (PPSV23)    Genetic screening        Relevant Orders    Referral to Genetic Research Studies    Pure hypercholesterolemia        Relevant Orders    Lipid Profile    Screening for cardiovascular condition        Relevant Orders    Comp Metabolic Panel    Screening for deficiency anemia        Relevant Orders    CBC WITHOUT DIFFERENTIAL    Thyroid disorder screen        Relevant Orders    TSH          Patient was educated in proper administration of medication(s) ordered today including safety, possible SE, risks, benefits, rationale and alternatives to therapy.   Supportive care, differential diagnoses, and indications for immediate follow-up discussed with patient.    Pathogenesis of diagnosis discussed including typical length and natural progression.    Instructed to return to clinic or nearest emergency department for any change in condition, further concerns, or worsening of symptoms.  Patient states understanding of the plan of care and discharge instructions.    Return in about 1 year (around 6/20/2024), or if symptoms worsen or fail to improve, for wellness, Labs.    Please note that this dictation was created using voice recognition software. I have made every reasonable attempt to correct obvious errors, but I expect that there  are errors of grammar and possibly content that I did not discover before finalizing the note.

## 2023-06-29 ENCOUNTER — HOSPITAL ENCOUNTER (OUTPATIENT)
Dept: RADIOLOGY | Facility: MEDICAL CENTER | Age: 63
End: 2023-06-29
Payer: MEDICARE

## 2023-06-29 DIAGNOSIS — Z12.31 ENCOUNTER FOR SCREENING MAMMOGRAM FOR MALIGNANT NEOPLASM OF BREAST: ICD-10-CM

## 2023-06-29 PROCEDURE — 77063 BREAST TOMOSYNTHESIS BI: CPT

## 2023-07-11 ENCOUNTER — OFFICE VISIT (OUTPATIENT)
Dept: PHYSICAL MEDICINE AND REHAB | Facility: MEDICAL CENTER | Age: 63
End: 2023-07-11
Payer: MEDICARE

## 2023-07-11 ENCOUNTER — RESEARCH ENCOUNTER (OUTPATIENT)
Dept: RESEARCH | Facility: WORKSITE | Age: 63
End: 2023-07-11
Payer: MEDICARE

## 2023-07-11 VITALS
DIASTOLIC BLOOD PRESSURE: 78 MMHG | WEIGHT: 145 LBS | HEIGHT: 64 IN | TEMPERATURE: 97 F | OXYGEN SATURATION: 96 % | HEART RATE: 97 BPM | SYSTOLIC BLOOD PRESSURE: 130 MMHG | BODY MASS INDEX: 24.75 KG/M2

## 2023-07-11 DIAGNOSIS — M16.0 PRIMARY OSTEOARTHRITIS OF BOTH HIPS: ICD-10-CM

## 2023-07-11 DIAGNOSIS — M51.36 DDD (DEGENERATIVE DISC DISEASE), LUMBAR: ICD-10-CM

## 2023-07-11 DIAGNOSIS — M51.27 PROTRUSION OF INTERVERTEBRAL DISC OF LUMBOSACRAL REGION: ICD-10-CM

## 2023-07-11 DIAGNOSIS — Z00.6 RESEARCH STUDY PATIENT: ICD-10-CM

## 2023-07-11 PROCEDURE — 3078F DIAST BP <80 MM HG: CPT | Performed by: PHYSICAL MEDICINE & REHABILITATION

## 2023-07-11 PROCEDURE — 99214 OFFICE O/P EST MOD 30 MIN: CPT | Performed by: PHYSICAL MEDICINE & REHABILITATION

## 2023-07-11 PROCEDURE — 3075F SYST BP GE 130 - 139MM HG: CPT | Performed by: PHYSICAL MEDICINE & REHABILITATION

## 2023-07-11 ASSESSMENT — PATIENT HEALTH QUESTIONNAIRE - PHQ9: CLINICAL INTERPRETATION OF PHQ2 SCORE: 0

## 2023-07-11 NOTE — PROGRESS NOTES
"Follow up patient note  Pain Medicine, Interventional spine and sports physiatry, Physical medicine rehabilitation      Chief complaint:   Chief Complaint   Patient presents with    Follow-Up          HISTORY    Please see new patient note dated 06/05/2020 by Dr King,  for more details.     HPI  Patient identification: YASH CRUZ 63 y.o. female with history of GERD, vitamin D deficiency, history of disc retrusion presents for follow-up.    Interval history:   Since her last visit, she reports that she has been having cracking in her left and right hips.  The left is bothering her more.  She feels like she can feel it pop when she is standing or when she stands after sitting for a while.  This seems not to \"hurt\" a lot.  She does have a feeling of tightness in her hips.    She also notes some cracking in her neck although this is nonpainful.    Not taking any medications for pain.  She has done well with regard to her low back.  She does occasionally have some back aching but this is significantly improved since that time which I saw her after she had a right paracentral disc retrusion at L5-S1 abutting the S1 nerve root.    No bowel or bladder changes.    Working part-time as a care-giver     ROS Red Flags :   Fever, Chills, Sweats: Denies  Involuntary Weight Loss: Denies  Bowel/Bladder Incontinence: Denies  Saddle Anesthesia: Denies    PMHx:   Past Medical History:   Diagnosis Date    Carcinoma in situ of cervix 1980    Cataracts, bilateral     Depression     Family history of breast cancer in mother     Family history of colon cancer     SAL (generalized anxiety disorder) 4/4/2017    GERD (gastroesophageal reflux disease)     hiatal hernia, Dr. Ozuna    Joint stiffness of hand     Moderate episode of recurrent major depressive disorder (HCC) 11/30/2016    Multiple gastric polyps 2008       PSHx:   Past Surgical History:   Procedure Laterality Date    DILATION AND CURETTAGE CONE BIOPSY  1979    " abnormal pap, carcinoma in situ    CATARACT EXTRACTION WITH IOL      Dr. Finn    COLONOSCOPY  , 2013    normal except for diverticulosis, Dr. Ozuna       Family history   Family History   Problem Relation Age of Onset    Breast Cancer Mother     Cancer Mother 66        breast, lumpectomy    Hypertension Mother     Heart Disease Mother         enlarged heart    GI Disease Mother         perforated diverticulitis, colostomy    Colorectal Cancer Father     Cancer Father 66        colon    Alcohol abuse Father     Bipolar disorder Father     Alcohol abuse Sister     Alcohol abuse Brother     Drug abuse Brother     Heart Disease Maternal Uncle 42        heart demise    Ovarian Cancer Maternal Grandmother     Stroke Maternal Grandmother     Cancer Maternal Grandmother         ovarian and gastric    Stroke Paternal Grandmother     Suicide Attempts Child     Tubal Cancer Neg Hx     Peritoneal Cancer Neg Hx     Diabetes Neg Hx     Hyperlipidemia Neg Hx        Medications:   Current Outpatient Medications   Medication    Probiotic Product (PROBIOTIC-10 PO)    triamcinolone acetonide (KENALOG) 0.1 % Ointment    Cimetidine (TAGAMET PO)    Loratadine (CLARITIN PO)    Cholecalciferol (VITAMIN D-3) 1000 units Cap    Multiple Vitamins-Minerals (CENTRUM SILVER PO)     No current facility-administered medications for this visit.       Allergies:   Allergies   Allergen Reactions    Pcn [Penicillins]     Tetanus Toxoid Hives and Rash     Body aches       Social Hx:   Social History     Socioeconomic History    Marital status:      Spouse name: Not on file    Number of children: Not on file    Years of education: Not on file    Highest education level: Not on file   Occupational History    Not on file   Tobacco Use    Smoking status: Former     Packs/day: 0.50     Years: 5.00     Pack years: 2.50     Types: Cigarettes     Quit date: 3/1/1983     Years since quittin.3    Smokeless tobacco: Never   Vaping Use    Vaping  "Use: Never used   Substance and Sexual Activity    Alcohol use: Yes     Comment: 3 drinks /week    Drug use: Not Currently     Types: Marijuana     Comment: cbd oil    Sexual activity: Yes     Partners: Male     Comment: Occassionally   Other Topics Concern     Service No    Blood Transfusions No    Caffeine Concern No    Occupational Exposure No    Hobby Hazards No    Sleep Concern Yes    Stress Concern Yes    Weight Concern No    Special Diet No    Back Care Yes    Exercise Yes    Bike Helmet No    Seat Belt Yes    Self-Exams No   Social History Narrative    Not on file     Social Determinants of Health     Financial Resource Strain: Not on file   Food Insecurity: Not on file   Transportation Needs: Not on file   Physical Activity: Not on file   Stress: Not on file   Social Connections: Not on file   Intimate Partner Violence: Not on file   Housing Stability: Not on file         EXAMINATION     Physical Exam:   Vitals: /78 (BP Location: Right arm, Patient Position: Sitting, BP Cuff Size: Adult)   Pulse 97   Temp 36.1 °C (97 °F) (Temporal)   Ht 1.626 m (5' 4\")   Wt 65.8 kg (145 lb)   SpO2 96%     Constitutional:   Body Habitus: Body mass index is 24.89 kg/m².  Cooperation: Fully cooperates with exam  Appearance: Well-groomed no disheveled, in no acute distress    Respiratory-  breathing comfortable on room air, no audible wheezing  Cardiovascular-  No lower extremity edema is noted.    Psychiatric- alert and oriented ×3. Normal affect.    Spine:   Cervical spine range of motion is full in flexion extension left and right rotation bilaterally.  No focal motor or sensory deficits in the upper extremities bilaterally.  Reflexes are 2+ in the biceps and triceps bilaterally.  Keely's is negative bilaterally.    Functional range of motion of the lumbar spine in flexion extension left and right rotation bilaterally  No tenderness to palpation over the PSIS or gluteal region bilaterally  4+/5 hip " abduction on the left, otherwise no focal motor or sensory deficits in the lower extremities bilaterally.  Reflexes are 2+ patella and Achilles bilaterally.  Heel raise is negative bilaterally.  Osiris's test is negative for back pain bilaterally.  Internal and external rotation in the hips is symmetric          MEDICAL DECISION MAKING    DATA    Labs:   Lab Results   Component Value Date/Time    SODIUM 140 06/06/2020 08:26 AM    POTASSIUM 4.8 06/06/2020 08:26 AM    CHLORIDE 101 06/06/2020 08:26 AM    CO2 27 06/06/2020 08:26 AM    GLUCOSE 92 06/06/2020 08:26 AM    BUN 14 06/06/2020 08:26 AM    CREATININE 0.80 06/06/2020 08:26 AM    CREATININE 0.76 03/26/2010 07:29 AM    BUNCREATRAT 13 03/26/2010 07:29 AM    GLOMRATE >59 03/26/2010 07:29 AM        No results found for: PROTHROMBTM, INR     Lab Results   Component Value Date/Time    WBC 3.7 (L) 06/06/2020 08:26 AM    WBC 5.4 03/26/2010 07:29 AM    RBC 4.29 06/06/2020 08:26 AM    RBC 4.43 03/26/2010 07:29 AM    HEMOGLOBIN 13.9 06/06/2020 08:26 AM    HEMATOCRIT 42.9 06/06/2020 08:26 AM    .0 (H) 06/06/2020 08:26 AM    MCV 96 03/26/2010 07:29 AM    MCH 32.4 06/06/2020 08:26 AM    MCH 31.8 03/26/2010 07:29 AM    MCHC 32.4 (L) 06/06/2020 08:26 AM    MPV 9.9 06/06/2020 08:26 AM    NEUTSPOLYS 48.20 06/06/2020 08:26 AM    LYMPHOCYTES 39.10 06/06/2020 08:26 AM    MONOCYTES 6.70 06/06/2020 08:26 AM    EOSINOPHILS 4.60 06/06/2020 08:26 AM    BASOPHILS 1.10 06/06/2020 08:26 AM        No results found for: HBA1C       Imaging: I personally reviewed following images    X-ray pelvis June 16, 2014  There is note of loss on the femoral neck suggestive of cam type impingement.  Mild hip osteoarthritis is noted bilaterally    I reviewed the following radiology reports    X-ray pelvis June 16, 2014  Impression    Mild bilateral hip osteoarthritis and femoral head morphology could predispose to cam type impingement    Results for orders placed in visit on 08/21/19    MR-LUMBAR  "SPINE-W/O    Impression  1.  There is right paracentral disc protrusion at L5-S1 causing effacement of the right lateral recess. The disc is abutting the exiting right S1 nerve root at the lateral recess.  2.  Broad-based right foraminal disc protrusion at L3-4 causing mild stenosis of right L3 neural foramina.  3.  Degenerative disease as described above.                                                                                         DIAGNOSIS   Visit Diagnoses     ICD-10-CM   1. Primary osteoarthritis of both hips  M16.0   2. DDD (degenerative disc disease), lumbar  M51.36   3. Protrusion of intervertebral disc of lumbosacral region  M51.27         ASSESSMENT and PLAN:     YASH CRUZ 63 y.o. female seen for above    YASH was seen today for follow-up.    Diagnoses and all orders for this visit:    Primary osteoarthritis of both hips  -     DX-HIP-COMPLETE - UNILATERAL 2+ LEFT; Future    DDD (degenerative disc disease), lumbar    Protrusion of intervertebral disc of lumbosacral region        Discussed that she is having some \"joint cracking\" but this is nonpainful.  She does have some left hip abduction weakness  And stiffness.  Ordered x-ray of left hip to reassess.  Discussed treatment options including referral to physical therapy.  We reviewed exercises for hip abductors as she prefers to work on home program.  She will contact the office if she like to pursue physical therapy.  No significant findings noted in the cervical spine and upper extremity exam.  No further imaging for now.  Advised that we would be concerned if she had cracking that she felt was associated with pain and to let us know if she develops any other symptoms or weakness/paresthesias.  She is doing well as regards to the previous disc protrusion.  Continue home exercise and activity as tolerated.  Encouraged her to follow-up with lab work as ordered by her PCP.    Follow up: after imaging, prn worsening of " symptoms    Thank you for allowing me to participate in the care of this patient. If you have any questions please not hesitate to contact me.        Please note that this dictation was created using voice recognition software. I have made every reasonable attempt to correct obvious errors but there may be errors of grammar and content that I may have overlooked prior to finalization of this note.      Danny King MD  Interventional Spine and Sports Physiatry  Physical Medicine and Rehabilitation  [facility]

## 2023-07-15 ENCOUNTER — HOSPITAL ENCOUNTER (OUTPATIENT)
Dept: RADIOLOGY | Facility: MEDICAL CENTER | Age: 63
End: 2023-07-15
Attending: PHYSICAL MEDICINE & REHABILITATION
Payer: MEDICARE

## 2023-07-15 DIAGNOSIS — M16.0 PRIMARY OSTEOARTHRITIS OF BOTH HIPS: ICD-10-CM

## 2023-07-15 PROCEDURE — 73502 X-RAY EXAM HIP UNI 2-3 VIEWS: CPT | Mod: LT

## 2023-07-18 ENCOUNTER — HOSPITAL ENCOUNTER (OUTPATIENT)
Dept: RADIOLOGY | Facility: MEDICAL CENTER | Age: 63
End: 2023-07-18
Attending: PHYSICIAN ASSISTANT
Payer: MEDICARE

## 2023-07-18 DIAGNOSIS — Z80.0 FAMILY HISTORY OF COLON CANCER: ICD-10-CM

## 2023-07-18 DIAGNOSIS — K21.9 GASTROESOPHAGEAL REFLUX DISEASE, UNSPECIFIED WHETHER ESOPHAGITIS PRESENT: ICD-10-CM

## 2023-07-18 DIAGNOSIS — R12 HEARTBURN: ICD-10-CM

## 2023-07-18 PROCEDURE — 74220 X-RAY XM ESOPHAGUS 1CNTRST: CPT

## 2023-07-28 ENCOUNTER — HOSPITAL ENCOUNTER (OUTPATIENT)
Dept: RADIOLOGY | Facility: MEDICAL CENTER | Age: 63
End: 2023-07-28
Payer: MEDICARE

## 2023-07-28 DIAGNOSIS — Z78.0 POSTMENOPAUSAL STATUS (AGE-RELATED) (NATURAL): ICD-10-CM

## 2023-07-28 DIAGNOSIS — N95.1 MENOPAUSAL STATE: ICD-10-CM

## 2023-07-28 PROCEDURE — 77080 DXA BONE DENSITY AXIAL: CPT

## 2023-08-09 ENCOUNTER — HOSPITAL ENCOUNTER (OUTPATIENT)
Dept: LAB | Facility: MEDICAL CENTER | Age: 63
End: 2023-08-09
Payer: MEDICARE

## 2023-08-09 DIAGNOSIS — E55.9 VITAMIN D DEFICIENCY: ICD-10-CM

## 2023-08-09 DIAGNOSIS — Z13.29 THYROID DISORDER SCREEN: ICD-10-CM

## 2023-08-09 DIAGNOSIS — Z13.0 SCREENING FOR DEFICIENCY ANEMIA: ICD-10-CM

## 2023-08-09 DIAGNOSIS — Z13.6 SCREENING FOR CARDIOVASCULAR CONDITION: ICD-10-CM

## 2023-08-09 DIAGNOSIS — E78.00 PURE HYPERCHOLESTEROLEMIA: ICD-10-CM

## 2023-08-09 LAB
25(OH)D3 SERPL-MCNC: 37 NG/ML (ref 30–100)
ALBUMIN SERPL BCP-MCNC: 4.7 G/DL (ref 3.2–4.9)
ALBUMIN/GLOB SERPL: 1.6 G/DL
ALP SERPL-CCNC: 72 U/L (ref 30–99)
ALT SERPL-CCNC: 16 U/L (ref 2–50)
ANION GAP SERPL CALC-SCNC: 14 MMOL/L (ref 7–16)
APOB+LDLR+PCSK9 GENE MUT ANL BLD/T: NOT DETECTED
AST SERPL-CCNC: 16 U/L (ref 12–45)
BILIRUB SERPL-MCNC: 0.4 MG/DL (ref 0.1–1.5)
BRCA1+BRCA2 DEL+DUP + FULL MUT ANL BLD/T: NOT DETECTED
BUN SERPL-MCNC: 8 MG/DL (ref 8–22)
CALCIUM ALBUM COR SERPL-MCNC: 9.3 MG/DL (ref 8.5–10.5)
CALCIUM SERPL-MCNC: 9.9 MG/DL (ref 8.5–10.5)
CHLORIDE SERPL-SCNC: 103 MMOL/L (ref 96–112)
CHOLEST SERPL-MCNC: 215 MG/DL (ref 100–199)
CO2 SERPL-SCNC: 24 MMOL/L (ref 20–33)
CREAT SERPL-MCNC: 0.71 MG/DL (ref 0.5–1.4)
ERYTHROCYTE [DISTWIDTH] IN BLOOD BY AUTOMATED COUNT: 46.4 FL (ref 35.9–50)
FASTING STATUS PATIENT QL REPORTED: NORMAL
GENETIC ANALYSIS OVERALL INTERPRETATION: POSITIVE
GENOMIC SOURCE CLASS: ABNORMAL
GFR SERPLBLD CREATININE-BSD FMLA CKD-EPI: 94 ML/MIN/1.73 M 2
GLOBULIN SER CALC-MCNC: 2.9 G/DL (ref 1.9–3.5)
GLUCOSE SERPL-MCNC: 88 MG/DL (ref 65–99)
HCT VFR BLD AUTO: 44.6 % (ref 37–47)
HDLC SERPL-MCNC: 67 MG/DL
HGB BLD-MCNC: 14.5 G/DL (ref 12–16)
LDLC SERPL CALC-MCNC: 109 MG/DL
MCH RBC QN AUTO: 31.6 PG (ref 27–33)
MCHC RBC AUTO-ENTMCNC: 32.5 G/DL (ref 32.2–35.5)
MCV RBC AUTO: 97.2 FL (ref 81.4–97.8)
MLH1+MSH2+MSH6+PMS2 GN DEL+DUP+FUL M: ABNORMAL
PLATELET # BLD AUTO: 323 K/UL (ref 164–446)
PMV BLD AUTO: 10.1 FL (ref 9–12.9)
POTASSIUM SERPL-SCNC: 4.2 MMOL/L (ref 3.6–5.5)
PROT SERPL-MCNC: 7.6 G/DL (ref 6–8.2)
RBC # BLD AUTO: 4.59 M/UL (ref 4.2–5.4)
SODIUM SERPL-SCNC: 141 MMOL/L (ref 135–145)
TRIGL SERPL-MCNC: 195 MG/DL (ref 0–149)
TSH SERPL DL<=0.005 MIU/L-ACNC: 8.75 UIU/ML (ref 0.38–5.33)
WBC # BLD AUTO: 5.4 K/UL (ref 4.8–10.8)

## 2023-08-09 PROCEDURE — 80061 LIPID PANEL: CPT

## 2023-08-09 PROCEDURE — 82306 VITAMIN D 25 HYDROXY: CPT

## 2023-08-09 PROCEDURE — 84443 ASSAY THYROID STIM HORMONE: CPT

## 2023-08-09 PROCEDURE — 80053 COMPREHEN METABOLIC PANEL: CPT

## 2023-08-09 PROCEDURE — 36415 COLL VENOUS BLD VENIPUNCTURE: CPT

## 2023-08-09 PROCEDURE — 85027 COMPLETE CBC AUTOMATED: CPT

## 2023-08-10 DIAGNOSIS — R79.89 ELEVATED TSH: ICD-10-CM

## 2023-08-10 DIAGNOSIS — E03.9 HYPOTHYROIDISM, UNSPECIFIED TYPE: ICD-10-CM

## 2023-08-23 ENCOUNTER — TELEPHONE (OUTPATIENT)
Dept: RESEARCH | Facility: WORKSITE | Age: 63
End: 2023-08-23
Payer: MEDICARE

## 2023-08-23 NOTE — RESULT ENCOUNTER NOTE
Arcadio Mueller,    I contacted this patient and disclosed their genetic results from hospitals to them. Patient will also be referred to genetic counseling. Please follow up with patient at their next appt or at your convenience if you would like to consult with the patient further.    Thank you,  Ruth Reno RN

## 2023-08-23 NOTE — TELEPHONE ENCOUNTER
Patient informed of genetic test results on 8/23/2023. Patient requests follow up genetic counseling and will be referred to [employer].

## 2023-08-30 DIAGNOSIS — Z15.09 PMS2-RELATED LYNCH SYNDROME (HNPCC4): ICD-10-CM

## 2023-09-19 ENCOUNTER — NON-PROVIDER VISIT (OUTPATIENT)
Dept: OCCUPATIONAL MEDICINE | Facility: CLINIC | Age: 63
End: 2023-09-19

## 2023-09-19 DIAGNOSIS — Z11.1 ENCOUNTER FOR PPD TEST: Primary | ICD-10-CM

## 2023-09-19 PROCEDURE — 86580 TB INTRADERMAL TEST: CPT | Performed by: NURSE PRACTITIONER

## 2023-09-21 ENCOUNTER — NON-PROVIDER VISIT (OUTPATIENT)
Dept: OCCUPATIONAL MEDICINE | Facility: CLINIC | Age: 63
End: 2023-09-21

## 2023-09-21 DIAGNOSIS — Z11.1 ENCOUNTER FOR PPD SKIN TEST READING: ICD-10-CM

## 2023-09-21 LAB — TB WHEAL 3D P 5 TU DIAM: NORMAL MM

## 2023-10-30 ENCOUNTER — OFFICE VISIT (OUTPATIENT)
Dept: DERMATOLOGY | Facility: IMAGING CENTER | Age: 63
End: 2023-10-30
Payer: MEDICARE

## 2023-10-30 DIAGNOSIS — Z12.83 SKIN CANCER SCREENING: ICD-10-CM

## 2023-10-30 DIAGNOSIS — L82.1 SK (SEBORRHEIC KERATOSIS): ICD-10-CM

## 2023-10-30 DIAGNOSIS — L81.4 LENTIGINES: ICD-10-CM

## 2023-10-30 DIAGNOSIS — D22.9 MULTIPLE NEVI: ICD-10-CM

## 2023-10-30 DIAGNOSIS — D18.01 CHERRY ANGIOMA: ICD-10-CM

## 2023-10-30 PROCEDURE — 99212 OFFICE O/P EST SF 10 MIN: CPT | Performed by: NURSE PRACTITIONER

## 2023-10-30 NOTE — PROGRESS NOTES
DERMATOLOGY NOTE  NEW VISIT       Chief complaint: Establish Care (Upper Body Exam)     Pt was referred to us by PCP. 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     REVIEW OF SYSTEMS:   Positive for skin (see HPI)  Negative for fevers and chills       EXAM:  LMP 07/27/2006   Constitutional: Well-developed, well-nourished, and in no distress.     A focused skin exam was performed including the affected areas of the upper body only--head/face, neck, trunk and BUEs. Notable findings on exam today listed below and/or in assessment/plan.     -sun exposed skin of trunk and b/l upper extremities and face with scattered clinically benign light brown reticulated macules all of which were morphologically similar and none of which were suspicious for skin cancer today on exam  Several scattered 1-3mm bright red macules and thin papules on the trunk and extremities  Several light brown medium brown skin-colored stuck-on waxy papules scattered on the trunk and extremities  Multiple light brown medium brown skin-colored macules papules scattered over the trunk >> extremities--All with benign-appearing pigment network patterns on dermoscopy        IMPRESSION / PLAN:    1. Lentigines  - Benign-appearing nature of lesions discussed during exam.   - Advised to continue to monitor for any return to clinic for new or concerning changes.      2. Cherry angioma  - Benign-appearing nature of lesions discussed during exam.   - Advised to continue to monitor for any return to  clinic for new or concerning changes.      3. Multiple nevi  - Benign-appearing nature of lesions discussed during exam.   - Advised to continue to monitor for any return to clinic for new or concerning changes.  - ABCDE's of melanoma discussed/handout given      4. SK (seborrheic keratosis)  - Benign-appearing nature of lesions discussed during exam.   - Advised to continue to monitor for any return to clinic for new or concerning changes.      5. Skin cancer screening  Skin cancer education  discussed importance of sun protective clothing, eyewear in addition to the use of broad spectrum sunscreen with SPF 30 or greater, as well as need for reapplication ~every 2 hours when exposed to UVR/handout given  discussed importance following up for any new or changing lesions as noted in handout given, but every 12 months exams in clinic in the setting of dermatologic history  ABCDE's of melanoma discussed/handout given        Please note that this dictation was created using voice recognition software. I have made every reasonable attempt to correct obvious errors, but I expect that there are errors of grammar and possibly content that I did not discover before finalizing the note.      Return to clinic in: Return in about 1 year (around 10/30/2024) for LARS. and as needed for any new or changing skin lesions.

## 2024-03-15 ENCOUNTER — TELEPHONE (OUTPATIENT)
Dept: HEALTH INFORMATION MANAGEMENT | Facility: OTHER | Age: 64
End: 2024-03-15
Payer: MEDICARE

## 2024-03-25 ENCOUNTER — OFFICE VISIT (OUTPATIENT)
Dept: FAMILY PLANNING/WOMEN'S HEALTH CLINIC | Facility: PHYSICIAN GROUP | Age: 64
End: 2024-03-25
Payer: MEDICARE

## 2024-03-25 VITALS
SYSTOLIC BLOOD PRESSURE: 136 MMHG | HEIGHT: 63 IN | BODY MASS INDEX: 25.69 KG/M2 | WEIGHT: 145 LBS | DIASTOLIC BLOOD PRESSURE: 78 MMHG

## 2024-03-25 DIAGNOSIS — G89.29 CHRONIC RIGHT-SIDED LOW BACK PAIN WITH RIGHT-SIDED SCIATICA: Chronic | ICD-10-CM

## 2024-03-25 DIAGNOSIS — M54.41 CHRONIC RIGHT-SIDED LOW BACK PAIN WITH RIGHT-SIDED SCIATICA: Chronic | ICD-10-CM

## 2024-03-25 DIAGNOSIS — K21.9 GASTROESOPHAGEAL REFLUX DISEASE WITHOUT ESOPHAGITIS: Chronic | ICD-10-CM

## 2024-03-25 DIAGNOSIS — G47.09 OTHER INSOMNIA: ICD-10-CM

## 2024-03-25 DIAGNOSIS — E55.9 VITAMIN D DEFICIENCY: ICD-10-CM

## 2024-03-25 DIAGNOSIS — F41.1 GENERALIZED ANXIETY DISORDER: ICD-10-CM

## 2024-03-25 PROBLEM — R09.89 LABILE BLOOD PRESSURE: Status: RESOLVED | Noted: 2019-12-20 | Resolved: 2024-03-25

## 2024-03-25 PROCEDURE — G0439 PPPS, SUBSEQ VISIT: HCPCS

## 2024-03-25 PROCEDURE — 3075F SYST BP GE 130 - 139MM HG: CPT

## 2024-03-25 PROCEDURE — 1126F AMNT PAIN NOTED NONE PRSNT: CPT

## 2024-03-25 PROCEDURE — 3078F DIAST BP <80 MM HG: CPT

## 2024-03-25 SDOH — SOCIAL STABILITY: SOCIAL NETWORK: HOW OFTEN DO YOU ATTENT MEETINGS OF THE CLUB OR ORGANIZATION YOU BELONG TO?: 1 TO 4 TIMES PER YEAR

## 2024-03-25 SDOH — HEALTH STABILITY: PHYSICAL HEALTH: ON AVERAGE, HOW MANY MINUTES DO YOU ENGAGE IN EXERCISE AT THIS LEVEL?: 30 MIN

## 2024-03-25 SDOH — ECONOMIC STABILITY: INCOME INSECURITY: IN THE PAST 12 MONTHS, HAS THE ELECTRIC, GAS, OIL, OR WATER COMPANY THREATENED TO SHUT OFF SERVICE IN YOUR HOME?: NO

## 2024-03-25 SDOH — SOCIAL STABILITY: SOCIAL NETWORK: ARE YOU MARRIED, WIDOWED, DIVORCED, SEPARATED, NEVER MARRIED, OR LIVING WITH A PARTNER?: MARRIED

## 2024-03-25 SDOH — HEALTH STABILITY: MENTAL HEALTH: HOW MANY STANDARD DRINKS CONTAINING ALCOHOL DO YOU HAVE ON A TYPICAL DAY?: PATIENT DOES NOT DRINK

## 2024-03-25 SDOH — HEALTH STABILITY: MENTAL HEALTH: HOW OFTEN DO YOU HAVE A DRINK CONTAINING ALCOHOL?: NEVER

## 2024-03-25 SDOH — HEALTH STABILITY: MENTAL HEALTH: HOW OFTEN DO YOU HAVE 6 OR MORE DRINKS ON ONE OCCASION?: NEVER

## 2024-03-25 SDOH — ECONOMIC STABILITY: HOUSING INSECURITY
IN THE LAST 12 MONTHS, WAS THERE A TIME WHEN YOU DID NOT HAVE A STEADY PLACE TO SLEEP OR SLEPT IN A SHELTER (INCLUDING NOW)?: NO

## 2024-03-25 SDOH — ECONOMIC STABILITY: HOUSING INSECURITY: IN THE LAST 12 MONTHS, HOW MANY PLACES HAVE YOU LIVED?: 1

## 2024-03-25 SDOH — ECONOMIC STABILITY: INCOME INSECURITY: IN THE LAST 12 MONTHS, WAS THERE A TIME WHEN YOU WERE NOT ABLE TO PAY THE MORTGAGE OR RENT ON TIME?: NO

## 2024-03-25 SDOH — SOCIAL STABILITY: SOCIAL NETWORK: HOW OFTEN DO YOU ATTEND CHURCH OR RELIGIOUS SERVICES?: 1 TO 4 TIMES PER YEAR

## 2024-03-25 SDOH — ECONOMIC STABILITY: FOOD INSECURITY: WITHIN THE PAST 12 MONTHS, YOU WORRIED THAT YOUR FOOD WOULD RUN OUT BEFORE YOU GOT MONEY TO BUY MORE.: NEVER TRUE

## 2024-03-25 SDOH — SOCIAL STABILITY: SOCIAL NETWORK: IN A TYPICAL WEEK, HOW MANY TIMES DO YOU TALK ON THE PHONE WITH FAMILY, FRIENDS, OR NEIGHBORS?: ONCE A WEEK

## 2024-03-25 SDOH — HEALTH STABILITY: MENTAL HEALTH
STRESS IS WHEN SOMEONE FEELS TENSE, NERVOUS, ANXIOUS, OR CAN'T SLEEP AT NIGHT BECAUSE THEIR MIND IS TROUBLED. HOW STRESSED ARE YOU?: NOT AT ALL

## 2024-03-25 SDOH — SOCIAL STABILITY: SOCIAL NETWORK
DO YOU BELONG TO ANY CLUBS OR ORGANIZATIONS SUCH AS CHURCH GROUPS UNIONS, FRATERNAL OR ATHLETIC GROUPS, OR SCHOOL GROUPS?: YES

## 2024-03-25 SDOH — ECONOMIC STABILITY: INCOME INSECURITY: HOW HARD IS IT FOR YOU TO PAY FOR THE VERY BASICS LIKE FOOD, HOUSING, MEDICAL CARE, AND HEATING?: NOT HARD AT ALL

## 2024-03-25 SDOH — ECONOMIC STABILITY: FOOD INSECURITY: WITHIN THE PAST 12 MONTHS, THE FOOD YOU BOUGHT JUST DIDN'T LAST AND YOU DIDN'T HAVE MONEY TO GET MORE.: NEVER TRUE

## 2024-03-25 SDOH — SOCIAL STABILITY: SOCIAL NETWORK: HOW OFTEN DO YOU GET TOGETHER WITH FRIENDS OR RELATIVES?: THREE TIMES A WEEK

## 2024-03-25 SDOH — ECONOMIC STABILITY: TRANSPORTATION INSECURITY
IN THE PAST 12 MONTHS, HAS LACK OF TRANSPORTATION KEPT YOU FROM MEETINGS, WORK, OR FROM GETTING THINGS NEEDED FOR DAILY LIVING?: NO

## 2024-03-25 SDOH — HEALTH STABILITY: PHYSICAL HEALTH: ON AVERAGE, HOW MANY DAYS PER WEEK DO YOU ENGAGE IN MODERATE TO STRENUOUS EXERCISE (LIKE A BRISK WALK)?: 3 DAYS

## 2024-03-25 SDOH — ECONOMIC STABILITY: TRANSPORTATION INSECURITY
IN THE PAST 12 MONTHS, HAS THE LACK OF TRANSPORTATION KEPT YOU FROM MEDICAL APPOINTMENTS OR FROM GETTING MEDICATIONS?: NO

## 2024-03-25 ASSESSMENT — ENCOUNTER SYMPTOMS: GENERAL WELL-BEING: GOOD

## 2024-03-25 ASSESSMENT — FIBROSIS 4 INDEX: FIB4 SCORE: 0.8

## 2024-03-25 ASSESSMENT — PATIENT HEALTH QUESTIONNAIRE - PHQ9: CLINICAL INTERPRETATION OF PHQ2 SCORE: 0

## 2024-03-25 ASSESSMENT — PAIN SCALES - GENERAL: PAINLEVEL: NO PAIN

## 2024-03-25 ASSESSMENT — LIFESTYLE VARIABLES
AUDIT-C TOTAL SCORE: 0
SKIP TO QUESTIONS 9-10: 1

## 2024-03-25 ASSESSMENT — ACTIVITIES OF DAILY LIVING (ADL): BATHING_REQUIRES_ASSISTANCE: 0

## 2024-03-25 NOTE — PROGRESS NOTES
Comprehensive Health Assessment Program     Adelia Durant is a 63 y.o. here for her comprehensive health assessment.    Patient Active Problem List    Diagnosis Date Noted    BMI 25.0-25.9,adult 2024    Chronic right-sided low back pain with right-sided sciatica 2019    Vitamin D deficiency 05/15/2018    SAL (generalized anxiety disorder) 2017    Other insomnia 2016    GERD (gastroesophageal reflux disease) 2015    History of carcinoma in situ of cervix uteri 2011       Current Outpatient Medications   Medication Sig Dispense Refill    Probiotic Product (PROBIOTIC-10 PO) Take  by mouth.      triamcinolone acetonide (KENALOG) 0.1 % Ointment Apply 1 g topically 2 times a day. 30 g 0    Cimetidine (TAGAMET PO) Take  by mouth.      Loratadine (CLARITIN PO) Take  by mouth.      Cholecalciferol (VITAMIN D-3) 1000 units Cap Take 1 Cap by mouth every day. 30 Cap 11    Multiple Vitamins-Minerals (CENTRUM SILVER PO) Take  by mouth.       No current facility-administered medications for this visit.          Current supplements as per medication list.     Allergies:   Pcn [penicillins] and Tetanus toxoid  Social History     Tobacco Use    Smoking status: Former     Current packs/day: 0.00     Average packs/day: 0.5 packs/day for 5.0 years (2.5 ttl pk-yrs)     Types: Cigarettes     Start date: 3/1/1978     Quit date: 3/1/1983     Years since quittin.0    Smokeless tobacco: Never   Vaping Use    Vaping Use: Never used   Substance Use Topics    Alcohol use: Yes     Comment: 3 drinks /week    Drug use: Not Currently     Types: Marijuana     Comment: cbd oil     Family History   Problem Relation Age of Onset    Breast Cancer Mother     Cancer Mother 66        breast, lumpectomy    Hypertension Mother     Heart Disease Mother         enlarged heart    GI Disease Mother         perforated diverticulitis, colostomy    Colorectal Cancer Father     Cancer Father 66        colon     Alcohol abuse Father     Bipolar disorder Father     Alcohol abuse Sister     Alcohol abuse Brother     Drug abuse Brother     Heart Disease Maternal Uncle 42        heart demise    Ovarian Cancer Maternal Grandmother     Stroke Maternal Grandmother     Cancer Maternal Grandmother         ovarian and gastric    Stroke Paternal Grandmother     Suicide Attempts Child     Tubal Cancer Neg Hx     Peritoneal Cancer Neg Hx     Diabetes Neg Hx     Hyperlipidemia Neg Hx      Adelia  has a past medical history of Carcinoma in situ of cervix (1980), Cataracts, bilateral, Depression, Family history of breast cancer in mother, Family history of colon cancer, SAL (generalized anxiety disorder) (04/04/2017), GERD (gastroesophageal reflux disease), Joint stiffness of hand, Labile blood pressure, Moderate episode of recurrent major depressive disorder (HCC) (11/30/2016), Multiple gastric polyps (2008), and Situational depression.   Past Surgical History:   Procedure Laterality Date    DILATION AND CURETTAGE CONE BIOPSY  1979    abnormal pap, carcinoma in situ    CATARACT EXTRACTION WITH IOL      Dr. Finn    COLONOSCOPY  2008, 4/2013    normal except for diverticulosis, Dr. Ozuna       Screening:  In the last six months have you experienced any leakage of urine? No    Depression Screening  Little interest or pleasure in doing things?  0 - not at all  Feeling down, depressed , or hopeless? 0 - not at all  Patient Health Questionnaire Score: 0     If depressive symptoms identified deferred to follow up visit unless specifically addressed in assessment and plan.    Interpretation of PHQ-9 Total Score   Score Severity   1-4 No Depression   5-9 Mild Depression   10-14 Moderate Depression   15-19 Moderately Severe Depression   20-27 Severe Depression    Screening for Cognitive Impairment  Do you or any of your friends or family members have any concern about your memory? No  Three Minute Recall (Leader, Season, Table) 2/3 Per pt she  has recently been diagnosed w/ short term memory loss by a provider.  Danyel clock face with all 12 numbers and set the hands to show 10 minutes after 11.  Yes    Cognitive concerns identified deferred for follow up unless specifically addressed in assessment and plan.    Fall Risk Assessment  Has the patient had two or more falls in the last year or any fall with injury in the last year?  No    Safety Assessment  Do you always wear your seatbelt?  Yes  Any changes to home needed to function safely? No  Difficulty hearing.  No  Patient counseled about all safety risks that were identified.    Functional Assessment ADLs  Are there any barriers preventing you from cooking for yourself or meeting nutritional needs?  No.    Are there any barriers preventing you from driving safely or obtaining transportation?  No.    Are there any barriers preventing you from using a telephone or calling for help?  No    Are there any barriers preventing you from shopping?  No.    Are there any barriers preventing you from taking care of your own finances?  No    Are there any barriers preventing you from managing your medications?  No    Are there any barriers preventing you from showering, bathing or dressing yourself? No    Are there any barriers preventing you from doing housework or laundry? No  Are there any barriers preventing you from using the toilet?No  Are you currently engaging in any exercise or physical activity?  Yes. Treadmill 3x a week, no walks    Self-Assessment of Health  What is your perception of your health? Good  Do you sleep more than six hours a night? Yes  In the past 7 days, how much did pain keep you from doing your normal work? Some  Do you spend quality time with family or friends (virtually or in person)? Yes  Do you usually eat a heart healthy diet that constists of a variety of fruits, vegetables, whole grains and fiber? Yes  Do you eat foods high in fat and/or Fast Food more than three times per week?  No    Advance Care Planning  Do you have an Advance Directive, Living Will, Durable Power of , or POLST? Yes    Living Will            Health Maintenance Summary            Overdue - HIV Screening (Once) Never done      No completion history exists for this topic.              Overdue - Cervical Cancer Screening (Every 2 Years) Overdue since 8/19/2023 08/19/2021  Pathology Gynecology Specimen    08/19/2021  THINPREP PAP WITH HPV    01/16/2017  PATHOLOGY GYN SPECIMEN    01/16/2017  THINPREP PAP, REFLEX HPV ON ASC-US AND ABOVE    08/24/2015  THINPREP PAP ONLY    Only the first 5 history entries have been loaded, but more history exists.              Pneumococcal Vaccine: 65+ Years (2 of 2 - PCV) Next due on 6/20/2024 06/20/2023  Imm Admin: Pneumococcal polysaccharide vaccine (PPSV-23)              Annual Wellness Visit (Yearly) Next due on 3/25/2025      03/25/2024  Done - Comprehensive Health Assessment    03/25/2024  Level of Service: NV ANNUAL WELLNESS VISIT-INCLUDES PPPS SUBSEQUE*    06/20/2023  Level of Service: NV PREVENTIVE VISIT,EST,65 & OVER              Mammogram (Every 2 Years) Next due on 6/29/2025 06/29/2023  MA-SCREENING MAMMO BILAT W/TOMOSYNTHESIS W/CAD    09/14/2021  MA-SCREENING MAMMO BILAT W/TOMOSYNTHESIS W/CAD    05/21/2020  MA-SCREENING MAMMO BILAT W/TOMOSYNTHESIS W/CAD    06/13/2018  MA-SCREEN MAMMO W/CAD-BILAT    06/01/2017  MA-MAMMO SCREENING BILAT W/GOMEZ W/CAD    Only the first 5 history entries have been loaded, but more history exists.              Bone Density Scan (Every 5 Years) Next due on 7/28/2028 07/28/2023  DS-BONE DENSITY STUDY (DEXA)              Colorectal Cancer Screening (Colonoscopy - Every 5 Years) Next due on 10/4/2028      10/04/2023  AMB EXTERNAL COLONOSCOPY RESULTS    06/19/2018  REFERRAL TO GI FOR COLONOSCOPY              Hepatitis C Screening  Completed      06/06/2020  Hepatitis C Antibody component of HEP C VIRUS ANTIBODY              Zoster  (Shingles) Vaccines (Series Information) Completed      08/19/2021  Imm Admin: Zoster Vaccine Recombinant (RZV) (SHINGRIX)    11/07/2020  Imm Admin: Zoster Vaccine Recombinant (RZV) (SHINGRIX)              Influenza Vaccine (Series Information) Completed      09/15/2023  Imm Admin: Influenza Vaccine Adult HD    10/25/2022  Imm Admin: Influenza Vaccine Quad Inj (Pf)    11/03/2021  Imm Admin: Influenza Vac Subunit Quad Inj (Pf)    09/28/2020  Imm Admin: Influenza Vaccine Quad Inj (Pf)    09/25/2019  Imm Admin: Influenza Vaccine Quad Inj (Pf)    Only the first 5 history entries have been loaded, but more history exists.              COVID-19 Vaccine (Series Information) Completed      10/14/2023  Imm Admin: Comirnaty (Covid-19 Vaccine, Mrna, [id number] Formula)    11/05/2022  Imm Admin: MODERNA BIVALENT BOOSTER SARS-COV-2 VACCINE (6+)    01/17/2022  Outside Immunization: COVID-19 mRNA (MOD)    07/14/2021  Outside Immunization: COVID-19 mRNA (MOD)    03/26/2021  Outside Immunization: COVID-19 mRNA (MOD)    Only the first 5 history entries have been loaded, but more history exists.              Hepatitis A Vaccine (Hep A) (Series Information) Aged Out      No completion history exists for this topic.              HPV Vaccines (Series Information) Aged Out      No completion history exists for this topic.              Polio Vaccine (Inactivated Polio) (Series Information) Aged Out      No completion history exists for this topic.              Meningococcal Immunization (Series Information) Aged Out      No completion history exists for this topic.              Discontinued - Hepatitis B Vaccine (Hep B)  Discontinued      02/16/2015  Imm Admin: Hepatitis B Vaccine Adolescent/Pediatric    10/20/2014  Imm Admin: Hepatitis B Vaccine Adolescent/Pediatric    09/16/2014  Imm Admin: Hepatitis B Vaccine (Adol/Adult)    08/12/2014  Imm Admin: Hepatitis B Vaccine Adolescent/Pediatric              Discontinued - IMM DTaP/Tdap/Td  "Vaccine  Discontinued      No completion history exists for this topic.                    Patient Care Team:  KARUNA Moore as PCP - General (Nurse Practitioner Family)  KARUNA Moore as PCP - [facility] (Nurse Practitioner Family)  Jet Ozuna M.D. as Consulting Physician (Gastroenterology)  Elie Finn M.D. as Consulting Physician (Ophthalmology)      Financial Resource Strain: Low Risk  (3/25/2024)    Overall Financial Resource Strain (CARDIA)     Difficulty of Paying Living Expenses: Not hard at all      Transportation Needs: No Transportation Needs (3/25/2024)    PRAPARE - Transportation     Lack of Transportation (Medical): No     Lack of Transportation (Non-Medical): No      Food Insecurity: No Food Insecurity (3/25/2024)    Hunger Vital Sign     Worried About Running Out of Food in the Last Year: Never true     Ran Out of Food in the Last Year: Never true        Encounter Vitals  Blood Pressure : 136/78  Weight: 65.8 kg (145 lb)  Height: 160 cm (5' 3\")  BMI (Calculated): 25.69  Pain Score: No pain     Alert, oriented in no acute distress.  Eye contact is good, speech goal directed, affect calm.    Assessment and Plan. The following treatment and monitoring plan is recommended:    BMI 25.0-25.9,adult  BMI is 25.69 kg/m2. Provided education on heart healthy diet with adequate intake of fruits, vegetables, and whole grains. Reports that she has a home treadmill and tries to use it three times a week and does physical therapy exercises daily. Provided [facility] gym resources. Encourage 30 minutes of moderate exercise 3-4 times a week.    Chronic right-sided low back pain with right-sided sciatica  Chronic, stable. Reports completion of physical therapy in 2020. Denies need for over-the-counter medication. Previously followed by physiatry, Dr. King.    SAL (generalized anxiety disorder)  Chronic, stable. No current medication use, previously managed on duloxetine. " Reports that anxiety and mood are stable.    GERD (gastroesophageal reflux disease)  Chronic, ongoing. Currently taking cimetidine daily and calcium carbonate as needed. Reports avoiding dietary triggers. Denies early satiety, unintentional weight loss, belching, and persistent burning pain in chest or upper abdomen. Followed by GI, Dr. Ozuna.     Other insomnia  Chronic, stable. Currently taking melatonin 5 mg nightly. Reports an average of 6-7 hours of sleep per night.     Vitamin D deficiency  Chronic, stable. Currently supplementing with 1,000 units of cholecalciferol daily. Followed by primary care provider.       Services suggested: No services needed at this time  Health Care Screening: Age-appropriate preventive services recommended by USPTF and ACIP covered by Medicare were discussed today. Services ordered if indicated and agreed upon by the patient.  Referrals offered: Community-based lifestyle interventions to reduce health risks and promote self-management and wellness, fall prevention, nutrition, physical activity, tobacco-use cessation, weight loss, and mental health services as per orders if indicated.    Discussion today about general wellness and lifestyle habits:    Prevent falls and reduce trip hazards; Cautioned about securing or removing rugs.  Have a working fire alarm and carbon monoxide detector.  Engage in regular physical activity and social activities.    Follow-up: Return for appointment with Primary Care Provider as needed.

## 2024-04-24 ENCOUNTER — HOSPITAL ENCOUNTER (OUTPATIENT)
Facility: MEDICAL CENTER | Age: 64
End: 2024-04-24
Attending: OBSTETRICS & GYNECOLOGY
Payer: MEDICARE

## 2024-04-24 ENCOUNTER — GYNECOLOGY VISIT (OUTPATIENT)
Dept: OBGYN | Facility: CLINIC | Age: 64
End: 2024-04-24
Payer: MEDICARE

## 2024-04-24 VITALS — WEIGHT: 155.2 LBS | BODY MASS INDEX: 27.49 KG/M2 | SYSTOLIC BLOOD PRESSURE: 130 MMHG | DIASTOLIC BLOOD PRESSURE: 80 MMHG

## 2024-04-24 DIAGNOSIS — Z15.09 LYNCH SYNDROME: ICD-10-CM

## 2024-04-24 DIAGNOSIS — Z12.31 ENCOUNTER FOR SCREENING MAMMOGRAM FOR MALIGNANT NEOPLASM OF BREAST: ICD-10-CM

## 2024-04-24 DIAGNOSIS — Z15.09 LYNCH SYNDROME: Primary | ICD-10-CM

## 2024-04-24 LAB
AMBIGUOUS DTTM AMBI4: NORMAL
AMBIGUOUS DTTM AMBI4: NORMAL
PATHOLOGY CONSULT NOTE: NORMAL

## 2024-04-24 PROCEDURE — 88175 CYTOPATH C/V AUTO FLUID REDO: CPT

## 2024-04-24 PROCEDURE — 87624 HPV HI-RISK TYP POOLED RSLT: CPT

## 2024-04-24 PROCEDURE — 88305 TISSUE EXAM BY PATHOLOGIST: CPT

## 2024-04-24 RX ORDER — CHLORAL HYDRATE 500 MG
1000 CAPSULE ORAL
COMMUNITY

## 2024-04-24 RX ORDER — FEXOFENADINE HCL 60 MG/1
60 TABLET, FILM COATED ORAL
COMMUNITY

## 2024-04-24 RX ORDER — DIAPER,BRIEF,INFANT-TODD,DISP
EACH MISCELLANEOUS 2 TIMES DAILY
COMMUNITY

## 2024-04-24 RX ORDER — ECHINACEA 400 MG
CAPSULE ORAL
COMMUNITY

## 2024-04-24 ASSESSMENT — FIBROSIS 4 INDEX: FIB4 SCORE: 0.8

## 2024-04-24 NOTE — PROGRESS NOTES
GYN Consult    CC/reason for consult: Bull syndrome    HPI: Adelia Durant is a 63 y.o.  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 Patients mother recently passed from what sounds like Pagets disease. Her dad passed away at age 66 from colon cancer. Maternal grandmother had ovarian cancer that spread to her stomach.   She has seen GI and had a colonoscopy and states that she was told she doesn't need to go back for 5 years. She reports severe diverticulosis.     ROS:  Constitutional: No fever, weight loss, weight gain, or fatigue  Skin/breast: No breast lump, nipple discharge, acne  Cardiovascular: No chest pain, leg swelling, palpitations  Respiratory: No shortness of breath, wheezing, or cough  Genitourinary: No pelvic pain, vaginal discharge, painful urination, abnormal periods, involuntary loss of urine, or vaginal bulge.  GI: No diarrhea, constipation, blood in stool, vomiting, abdominal pain  Endocrine: No hot flashes, abnormal thirst  Psychiatric: No depression, anxiety, or thoughts of self-harm  Neurologic: No headaches or seizures  Heme/lymph: No enlarged lymph nodes, denies bruising/bleeding that will not stop  Allergic: Denies allergies  MSK: Denies muscle weakness        GYN History:  Menopause- age 46. Had a cone biopsy in . Paps have been normal since then. She thinks her last pap was in 2019. No history of sexually transmitted diseases.       OB history:    OB History    Para Term  AB Living   2 1 1   1 1   SAB IAB Ectopic Molar Multiple Live Births   1         1      # Outcome Date GA Lbr Chung/2nd Weight Sex Delivery Anes PTL Lv   2 SAB  4w0d          1 Term 10/16/84 40w0d   M CS-Unspec   NABOR       Past Medical History:   Diagnosis Date    Carcinoma in situ of cervix     Cataracts, bilateral     Depression     Family history of breast cancer in mother     Family history of colon cancer     SAL (generalized anxiety disorder) 2017    GERD  (gastroesophageal reflux disease)     hiatal hernia, Dr. Ozuna    Joint stiffness of hand     Labile blood pressure     Moderate episode of recurrent major depressive disorder (HCC) 11/30/2016    Multiple gastric polyps 2008    Situational depression        Past Surgical History:   Procedure Laterality Date    DILATION AND CURETTAGE CONE BIOPSY  1979    abnormal pap, carcinoma in situ    CATARACT EXTRACTION WITH IOL      Dr. Finn    COLONOSCOPY  2008, 4/2013    normal except for diverticulosis, Dr. Ozuna       Medications:   Current Outpatient Medications Ordered in Epic   Medication Sig Dispense Refill    cimetidine (TAGAMET HB) 200 MG tablet tablet      fexofenadine (ALLEGRA) 60 MG Tab Take 60 mg by mouth.      Omega-3 Fatty Acids (FISH OIL) 1000 MG Cap capsule Take 1,000 mg by mouth.      SODIUM BICARBONATE PO Take  by mouth.      hydrocortisone 0.5 % Cream Apply  topically 2 times a day.      Ca Carbonate-Mag Hydroxide (ROLAIDS PO) Take  by mouth.      Flaxseed, Linseed, (FLAXSEED OIL) 1000 MG Cap Take  by mouth.      Omega 3-6-9 Fatty Acids (OMEGA 3-6-9 PO) Take  by mouth.      Probiotic Product (PROBIOTIC-10 PO) Take  by mouth.      triamcinolone acetonide (KENALOG) 0.1 % Ointment Apply 1 g topically 2 times a day. 30 g 0    Loratadine (CLARITIN PO) Take 10 mg by mouth.      Cholecalciferol (VITAMIN D-3) 1000 units Cap Take 1 Cap by mouth every day. 30 Cap 11    Multiple Vitamins-Minerals (CENTRUM SILVER PO) Take  by mouth.      Cimetidine (TAGAMET PO) Take  by mouth. (Patient not taking: Reported on 4/24/2024)       No current [facility]-ordered facility-administered medications on file.       Allergies: Pcn [penicillins] and Tetanus toxoid    Social History     Socioeconomic History    Marital status:    Tobacco Use    Smoking status: Former     Current packs/day: 0.00     Average packs/day: 0.5 packs/day for 5.0 years (2.5 ttl pk-yrs)     Types: Cigarettes     Start date: 3/1/1978     Quit date:  3/1/1983     Years since quittin.1    Smokeless tobacco: Never   Vaping Use    Vaping Use: Never used   Substance and Sexual Activity    Alcohol use: Yes     Comment: 1 drinks /week    Drug use: Not Currently     Types: Marijuana     Comment: cbd oil    Sexual activity: Not Currently     Partners: Male     Comment: Occassionally   Other Topics Concern     Service No    Blood Transfusions No    Caffeine Concern No    Occupational Exposure No    Hobby Hazards No    Sleep Concern Yes    Stress Concern Yes    Weight Concern No    Special Diet No    Back Care Yes    Exercise Yes    Bike Helmet No    Seat Belt Yes    Self-Exams No     Social Determinants of Health     Financial Resource Strain: Low Risk  (3/25/2024)    Overall Financial Resource Strain (CARDIA)     Difficulty of Paying Living Expenses: Not hard at all   Food Insecurity: No Food Insecurity (3/25/2024)    Hunger Vital Sign     Worried About Running Out of Food in the Last Year: Never true     Ran Out of Food in the Last Year: Never true   Transportation Needs: No Transportation Needs (3/25/2024)    PRAPARE - Transportation     Lack of Transportation (Medical): No     Lack of Transportation (Non-Medical): No   Physical Activity: Insufficiently Active (3/25/2024)    Exercise Vital Sign     Days of Exercise per Week: 3 days     Minutes of Exercise per Session: 30 min   Stress: No Stress Concern Present (3/25/2024)    [employer] - Occupational Stress Questionnaire     Feeling of Stress : Not at all   Social Connections: Socially Integrated (3/25/2024)    Social Connection and Isolation Panel [NHANES]     Frequency of Communication with Friends and Family: Once a week     Frequency of Social Gatherings with Friends and Family: Three times a week     Attends Yarsani Services: 1 to 4 times per year     Active Member of Clubs or Organizations: Yes     Attends Club or Organization Meetings: 1 to 4 times per year      Marital Status:    Housing Stability: Low Risk  (3/25/2024)    Housing Stability Vital Sign     Unable to Pay for Housing in the Last Year: No     Number of Places Lived in the Last Year: 1     Unstable Housing in the Last Year: No       Family History   Problem Relation Age of Onset    Breast Cancer Mother     Cancer Mother 66        breast, lumpectomy    Hypertension Mother     Heart Disease Mother         enlarged heart    GI Disease Mother         perforated diverticulitis, colostomy    Colorectal Cancer Father     Cancer Father 66        colon    Alcohol abuse Father     Bipolar disorder Father     Alcohol abuse Sister     Alcohol abuse Brother     Drug abuse Brother     Heart Disease Maternal Uncle 42        heart demise    Ovarian Cancer Maternal Grandmother     Stroke Maternal Grandmother     Cancer Maternal Grandmother         ovarian and gastric    Stroke Paternal Grandmother     Suicide Attempts Child     Tubal Cancer Neg Hx     Peritoneal Cancer Neg Hx     Diabetes Neg Hx     Hyperlipidemia Neg Hx          Physical Exam:  /80 (BP Location: Right arm, Patient Position: Sitting, BP Cuff Size: Adult)   Wt 155 lb 3.2 oz   LMP 2006   BMI 27.49 kg/m²   gen: AAO, NAD, affect appropriate  Neck: non-tender, no masses, no thyromegaly/nodules appreciated  CV: RRR; no LE edema  Resp: Symmetric non labored breathing, CTAB  Abd: soft, NT, ND, no masses, no organomegaly, no hernias  : NEFG, normal urethral meatus, normal anus/perineum, normal vagina and cervix.  Uterus midline, anteverted, no adnexal masses/tenderness  Skin: warm/dry, no lesions    A/P: 63 y.o.  with Bull Syndrome  1. Bull syndrome  THINPREP PAP W/HPV     US-PELVIC COMPLETE (TRANSABDOMINAL/TRANSVAGINAL) (COMBO)    Referral to Gynecologic Oncology    Consent for all Surgical, Special Diagnostic or Therapeutic Procedures    THINPREP PAP WITH HPV    Pathology Specimen    MA-SCREENING MAMMO BILAT W/TOMOSYNTHESIS W/CAD       2. Encounter for screening mammogram for malignant neoplasm of breast  MA-SCREENING MAMMO BILAT W/TOMOSYNTHESIS W/CAD        Today we discussed that typical monitoring of Bull syndrome for patients older than the age of 35 is annual endometrial biopsies as well as annual transvaginal ultrasounds.  We also discussed that there is a recommendation for women who have Bull syndrome that are finished with the reproductive years to have a prophylactic hysterectomy with bilateral salpingo-oophorectomy.  This was new information for the patient so she asked for time to process.  I also discussed that she can have a second opinion with a gynecologic oncologist which she would like.  This referral was placed today.  Endometrial biopsy was performed today and transvaginal ultrasound was ordered as well as a screening mammogram.  I will follow-up with patient if there are any concerning findings and she will follow-up with me if she decides that she would like to move forward with scheduling a total laparoscopic hysterectomy with bilateral salpingo-oophorectomy.  If she opts to not pursue surgery, she understands and my recommendation is to follow-up in 1 year for an endometrial biopsy.

## 2024-04-24 NOTE — PROCEDURES
EMB Procedure note     Indications for biopsy: Bull Syndrome       Patient was counselled regarding the indications for an endometrial biopsy, the small but potential risks of perforation, infection, or inadequate sampling.  All of the patient’s questions were answered and she consented for an endometrial biopsy. Consent was signed.      Pregnancy test not indicated     Time out was done to confirm patient, procedure and appropriate equipment was present.       Patient is placed in dorsal lithotomy position and a speculum was placed into the vagina. The cervix was prepped with betadine x3. A tenaculum was placed on the anterior lip of the cervix. The uterus sounded to 6 cm.     An endometrial pipelle was passed through the cervical os into the endometrial cavity. 2 passes were made. Scant tissue was obtained and sent to pathology.      Hemostasis was noted. All instruments were removed.      Patient tolerated the procedure well and there were no complications.  She was instructed that she may have bleeding and cramping but it should be minimal. She is to contact our office with signs of infection, severe pain or fever greater than 101. Follow up for results were arranged.

## 2024-04-24 NOTE — PROGRESS NOTES
Pt here to discuss Bull Syndrome  Pt states that she was referred over from PCP.  Good #[phone]  Pharmacy verified.

## 2024-04-28 LAB
CYTOLOGIST CVX/VAG CYTO: NORMAL
CYTOLOGY CVX/VAG DOC CYTO: NORMAL
CYTOLOGY CVX/VAG DOC THIN PREP: NORMAL
HPV I/H RISK 4 DNA CVX QL PROBE+SIG AMP: NEGATIVE
NOTE NL11727A: NORMAL
OTHER STN SPEC: NORMAL
STAT OF ADQ CVX/VAG CYTO-IMP: NORMAL

## 2024-05-10 ENCOUNTER — APPOINTMENT (OUTPATIENT)
Dept: ADMISSIONS | Facility: MEDICAL CENTER | Age: 64
End: 2024-05-10
Attending: OBSTETRICS & GYNECOLOGY
Payer: MEDICARE

## 2024-05-16 ENCOUNTER — PRE-ADMISSION TESTING (OUTPATIENT)
Dept: ADMISSIONS | Facility: MEDICAL CENTER | Age: 64
End: 2024-05-16
Attending: OBSTETRICS & GYNECOLOGY
Payer: MEDICARE

## 2024-05-16 RX ORDER — LORATADINE 10 MG/1
10 TABLET ORAL PRN
COMMUNITY

## 2024-05-16 RX ORDER — CALCIUM CARBONATE AND MAGNESIUM HYDROXIDE 675; 135 MG/1; MG/1
1 TABLET, CHEWABLE ORAL
COMMUNITY

## 2024-05-16 NOTE — OR NURSING
Pre admit appointment completed with Diana.    Medication and fasting instructions given per RN pre procedure protocol. Pt instructed to stop taking all vitamins and herbal supplements 7 days prior to surgery. Pt instructed to stop any NSAIDS 5 days prior to surgery, unless otherwise instructed by medical provider.     Pt instructed to take the following medications day of surgery: allegra, claritin and tagamet.     Pt verbalizes understanding of all instructions given. No further questions at this time. Medication instruction sheet stapled to testing passport for patient to receive at testing appointment on 5/17/24.

## 2024-05-17 ENCOUNTER — PRE-ADMISSION TESTING (OUTPATIENT)
Dept: ADMISSIONS | Facility: MEDICAL CENTER | Age: 64
End: 2024-05-17
Attending: OBSTETRICS & GYNECOLOGY
Payer: MEDICARE

## 2024-05-17 ENCOUNTER — HOSPITAL ENCOUNTER (OUTPATIENT)
Dept: RADIOLOGY | Facility: MEDICAL CENTER | Age: 64
End: 2024-05-17
Attending: OBSTETRICS & GYNECOLOGY | Admitting: OBSTETRICS & GYNECOLOGY
Payer: MEDICARE

## 2024-05-17 DIAGNOSIS — Z01.812 PRE-OPERATIVE LABORATORY EXAMINATION: ICD-10-CM

## 2024-05-17 DIAGNOSIS — Z01.810 PRE-OPERATIVE CARDIOVASCULAR EXAMINATION: ICD-10-CM

## 2024-05-17 DIAGNOSIS — Z01.811 PRE-OPERATIVE RESPIRATORY EXAMINATION: ICD-10-CM

## 2024-05-17 LAB
ABO GROUP BLD: NORMAL
ALBUMIN SERPL BCP-MCNC: 4.5 G/DL (ref 3.2–4.9)
ALBUMIN/GLOB SERPL: 1.6 G/DL
ALP SERPL-CCNC: 72 U/L (ref 30–99)
ALT SERPL-CCNC: 28 U/L (ref 2–50)
ANION GAP SERPL CALC-SCNC: 12 MMOL/L (ref 7–16)
APTT PPP: 28.7 SEC (ref 24.7–36)
AST SERPL-CCNC: 24 U/L (ref 12–45)
BASOPHILS # BLD AUTO: 1 % (ref 0–1.8)
BASOPHILS # BLD: 0.05 K/UL (ref 0–0.12)
BILIRUB SERPL-MCNC: 0.3 MG/DL (ref 0.1–1.5)
BLD GP AB SCN SERPL QL: NORMAL
BUN SERPL-MCNC: 14 MG/DL (ref 8–22)
CALCIUM ALBUM COR SERPL-MCNC: 9.6 MG/DL (ref 8.5–10.5)
CALCIUM SERPL-MCNC: 10 MG/DL (ref 8.5–10.5)
CHLORIDE SERPL-SCNC: 102 MMOL/L (ref 96–112)
CO2 SERPL-SCNC: 26 MMOL/L (ref 20–33)
CREAT SERPL-MCNC: 0.91 MG/DL (ref 0.5–1.4)
EKG IMPRESSION: NORMAL
EOSINOPHIL # BLD AUTO: 0.17 K/UL (ref 0–0.51)
EOSINOPHIL NFR BLD: 3.3 % (ref 0–6.9)
ERYTHROCYTE [DISTWIDTH] IN BLOOD BY AUTOMATED COUNT: 45.7 FL (ref 35.9–50)
GFR SERPLBLD CREATININE-BSD FMLA CKD-EPI: 70 ML/MIN/1.73 M 2
GLOBULIN SER CALC-MCNC: 2.9 G/DL (ref 1.9–3.5)
GLUCOSE SERPL-MCNC: 93 MG/DL (ref 65–99)
HCT VFR BLD AUTO: 43.8 % (ref 37–47)
HGB BLD-MCNC: 14.8 G/DL (ref 12–16)
IMM GRANULOCYTES # BLD AUTO: 0.01 K/UL (ref 0–0.11)
IMM GRANULOCYTES NFR BLD AUTO: 0.2 % (ref 0–0.9)
INR PPP: 0.93 (ref 0.87–1.13)
LYMPHOCYTES # BLD AUTO: 1.8 K/UL (ref 1–4.8)
LYMPHOCYTES NFR BLD: 35.4 % (ref 22–41)
MCH RBC QN AUTO: 32.2 PG (ref 27–33)
MCHC RBC AUTO-ENTMCNC: 33.8 G/DL (ref 32.2–35.5)
MCV RBC AUTO: 95.2 FL (ref 81.4–97.8)
MONOCYTES # BLD AUTO: 0.32 K/UL (ref 0–0.85)
MONOCYTES NFR BLD AUTO: 6.3 % (ref 0–13.4)
NEUTROPHILS # BLD AUTO: 2.74 K/UL (ref 1.82–7.42)
NEUTROPHILS NFR BLD: 53.8 % (ref 44–72)
NRBC # BLD AUTO: 0 K/UL
NRBC BLD-RTO: 0 /100 WBC (ref 0–0.2)
PLATELET # BLD AUTO: 318 K/UL (ref 164–446)
PMV BLD AUTO: 9.8 FL (ref 9–12.9)
POTASSIUM SERPL-SCNC: 4.3 MMOL/L (ref 3.6–5.5)
PROT SERPL-MCNC: 7.4 G/DL (ref 6–8.2)
PROTHROMBIN TIME: 12.6 SEC (ref 12–14.6)
RBC # BLD AUTO: 4.6 M/UL (ref 4.2–5.4)
RH BLD: NORMAL
SODIUM SERPL-SCNC: 140 MMOL/L (ref 135–145)
WBC # BLD AUTO: 5.1 K/UL (ref 4.8–10.8)

## 2024-05-17 PROCEDURE — 93010 ELECTROCARDIOGRAM REPORT: CPT | Performed by: INTERNAL MEDICINE

## 2024-05-21 ENCOUNTER — ANESTHESIA EVENT (OUTPATIENT)
Dept: SURGERY | Facility: MEDICAL CENTER | Age: 64
End: 2024-05-21
Payer: MEDICARE

## 2024-05-21 ENCOUNTER — ANESTHESIA (OUTPATIENT)
Dept: SURGERY | Facility: MEDICAL CENTER | Age: 64
End: 2024-05-21
Payer: MEDICARE

## 2024-05-21 ENCOUNTER — HOSPITAL ENCOUNTER (OUTPATIENT)
Facility: MEDICAL CENTER | Age: 64
End: 2024-05-21
Attending: OBSTETRICS & GYNECOLOGY | Admitting: OBSTETRICS & GYNECOLOGY
Payer: MEDICARE

## 2024-05-21 VITALS
OXYGEN SATURATION: 91 % | RESPIRATION RATE: 18 BRPM | HEART RATE: 73 BPM | BODY MASS INDEX: 26.38 KG/M2 | HEIGHT: 64 IN | TEMPERATURE: 97.5 F | SYSTOLIC BLOOD PRESSURE: 128 MMHG | WEIGHT: 154.54 LBS | DIASTOLIC BLOOD PRESSURE: 61 MMHG

## 2024-05-21 LAB
ABO + RH BLD: NORMAL
PATHOLOGY CONSULT NOTE: NORMAL

## 2024-05-21 RX ORDER — DIPHENHYDRAMINE HYDROCHLORIDE 50 MG/ML
12.5 INJECTION INTRAMUSCULAR; INTRAVENOUS
Status: DISCONTINUED | OUTPATIENT
Start: 2024-05-21 | End: 2024-05-21 | Stop reason: HOSPADM

## 2024-05-21 RX ORDER — DEXAMETHASONE SODIUM PHOSPHATE 4 MG/ML
INJECTION, SOLUTION INTRA-ARTICULAR; INTRALESIONAL; INTRAMUSCULAR; INTRAVENOUS; SOFT TISSUE PRN
Status: DISCONTINUED | OUTPATIENT
Start: 2024-05-21 | End: 2024-05-21 | Stop reason: SURG

## 2024-05-21 RX ORDER — OXYCODONE HCL 5 MG/5 ML
5 SOLUTION, ORAL ORAL
Status: COMPLETED | OUTPATIENT
Start: 2024-05-21 | End: 2024-05-21

## 2024-05-21 RX ORDER — LABETALOL HYDROCHLORIDE 5 MG/ML
5 INJECTION, SOLUTION INTRAVENOUS
Status: DISCONTINUED | OUTPATIENT
Start: 2024-05-21 | End: 2024-05-21 | Stop reason: HOSPADM

## 2024-05-21 RX ORDER — ONDANSETRON 2 MG/ML
4 INJECTION INTRAMUSCULAR; INTRAVENOUS
Status: COMPLETED | OUTPATIENT
Start: 2024-05-21 | End: 2024-05-21

## 2024-05-21 RX ORDER — KETOROLAC TROMETHAMINE 15 MG/ML
15 INJECTION, SOLUTION INTRAMUSCULAR; INTRAVENOUS ONCE
Status: COMPLETED | OUTPATIENT
Start: 2024-05-21 | End: 2024-05-21

## 2024-05-21 RX ORDER — ROCURONIUM BROMIDE 10 MG/ML
INJECTION, SOLUTION INTRAVENOUS PRN
Status: DISCONTINUED | OUTPATIENT
Start: 2024-05-21 | End: 2024-05-21 | Stop reason: SURG

## 2024-05-21 RX ORDER — IPRATROPIUM BROMIDE AND ALBUTEROL SULFATE 2.5; .5 MG/3ML; MG/3ML
3 SOLUTION RESPIRATORY (INHALATION)
Status: DISCONTINUED | OUTPATIENT
Start: 2024-05-21 | End: 2024-05-21 | Stop reason: HOSPADM

## 2024-05-21 RX ORDER — SODIUM CHLORIDE, SODIUM LACTATE, POTASSIUM CHLORIDE, CALCIUM CHLORIDE 600; 310; 30; 20 MG/100ML; MG/100ML; MG/100ML; MG/100ML
INJECTION, SOLUTION INTRAVENOUS
Status: DISCONTINUED | OUTPATIENT
Start: 2024-05-21 | End: 2024-05-21 | Stop reason: SURG

## 2024-05-21 RX ORDER — HYDRALAZINE HYDROCHLORIDE 20 MG/ML
5 INJECTION INTRAMUSCULAR; INTRAVENOUS
Status: DISCONTINUED | OUTPATIENT
Start: 2024-05-21 | End: 2024-05-21 | Stop reason: HOSPADM

## 2024-05-21 RX ORDER — HYDROMORPHONE HYDROCHLORIDE 1 MG/ML
0.2 INJECTION, SOLUTION INTRAMUSCULAR; INTRAVENOUS; SUBCUTANEOUS
Status: DISCONTINUED | OUTPATIENT
Start: 2024-05-21 | End: 2024-05-21 | Stop reason: HOSPADM

## 2024-05-21 RX ORDER — EPHEDRINE SULFATE 50 MG/ML
5 INJECTION, SOLUTION INTRAVENOUS
Status: DISCONTINUED | OUTPATIENT
Start: 2024-05-21 | End: 2024-05-21 | Stop reason: HOSPADM

## 2024-05-21 RX ORDER — SODIUM CHLORIDE, SODIUM LACTATE, POTASSIUM CHLORIDE, CALCIUM CHLORIDE 600; 310; 30; 20 MG/100ML; MG/100ML; MG/100ML; MG/100ML
INJECTION, SOLUTION INTRAVENOUS CONTINUOUS
Status: DISCONTINUED | OUTPATIENT
Start: 2024-05-21 | End: 2024-05-21 | Stop reason: HOSPADM

## 2024-05-21 RX ORDER — PROMETHAZINE HYDROCHLORIDE 25 MG/1
25 SUPPOSITORY RECTAL EVERY 4 HOURS PRN
Status: DISCONTINUED | OUTPATIENT
Start: 2024-05-21 | End: 2024-05-21 | Stop reason: HOSPADM

## 2024-05-21 RX ORDER — HYDROMORPHONE HYDROCHLORIDE 1 MG/ML
0.1 INJECTION, SOLUTION INTRAMUSCULAR; INTRAVENOUS; SUBCUTANEOUS
Status: DISCONTINUED | OUTPATIENT
Start: 2024-05-21 | End: 2024-05-21 | Stop reason: HOSPADM

## 2024-05-21 RX ORDER — HYDROMORPHONE HYDROCHLORIDE 1 MG/ML
0.4 INJECTION, SOLUTION INTRAMUSCULAR; INTRAVENOUS; SUBCUTANEOUS
Status: DISCONTINUED | OUTPATIENT
Start: 2024-05-21 | End: 2024-05-21 | Stop reason: HOSPADM

## 2024-05-21 RX ORDER — SODIUM CHLORIDE, SODIUM LACTATE, POTASSIUM CHLORIDE, CALCIUM CHLORIDE 600; 310; 30; 20 MG/100ML; MG/100ML; MG/100ML; MG/100ML
INJECTION, SOLUTION INTRAVENOUS CONTINUOUS
Status: ACTIVE | OUTPATIENT
Start: 2024-05-21 | End: 2024-05-21

## 2024-05-21 RX ORDER — OXYCODONE HCL 5 MG/5 ML
10 SOLUTION, ORAL ORAL
Status: COMPLETED | OUTPATIENT
Start: 2024-05-21 | End: 2024-05-21

## 2024-05-21 RX ORDER — CEFAZOLIN SODIUM 1 G/3ML
INJECTION, POWDER, FOR SOLUTION INTRAMUSCULAR; INTRAVENOUS PRN
Status: DISCONTINUED | OUTPATIENT
Start: 2024-05-21 | End: 2024-05-21 | Stop reason: SURG

## 2024-05-21 RX ORDER — LIDOCAINE HYDROCHLORIDE 20 MG/ML
INJECTION, SOLUTION EPIDURAL; INFILTRATION; INTRACAUDAL; PERINEURAL PRN
Status: DISCONTINUED | OUTPATIENT
Start: 2024-05-21 | End: 2024-05-21 | Stop reason: SURG

## 2024-05-21 RX ORDER — BUPIVACAINE HYDROCHLORIDE AND EPINEPHRINE 2.5; 5 MG/ML; UG/ML
INJECTION, SOLUTION EPIDURAL; INFILTRATION; INTRACAUDAL; PERINEURAL
Status: DISCONTINUED | OUTPATIENT
Start: 2024-05-21 | End: 2024-05-21 | Stop reason: HOSPADM

## 2024-05-21 RX ADMIN — ONDANSETRON 4 MG: 2 INJECTION INTRAMUSCULAR; INTRAVENOUS at 08:39

## 2024-05-21 RX ADMIN — FENTANYL CITRATE 25 MCG: 50 INJECTION, SOLUTION INTRAMUSCULAR; INTRAVENOUS at 08:39

## 2024-05-21 RX ADMIN — FENTANYL CITRATE 100 MCG: 50 INJECTION, SOLUTION INTRAMUSCULAR; INTRAVENOUS at 07:29

## 2024-05-21 RX ADMIN — SODIUM CHLORIDE, POTASSIUM CHLORIDE, SODIUM LACTATE AND CALCIUM CHLORIDE: 600; 310; 30; 20 INJECTION, SOLUTION INTRAVENOUS at 07:24

## 2024-05-21 RX ADMIN — DEXAMETHASONE SODIUM PHOSPHATE 4 MG: 4 INJECTION INTRA-ARTICULAR; INTRALESIONAL; INTRAMUSCULAR; INTRAVENOUS; SOFT TISSUE at 07:32

## 2024-05-21 RX ADMIN — KETOROLAC TROMETHAMINE 15 MG: 15 INJECTION, SOLUTION INTRAMUSCULAR; INTRAVENOUS at 08:59

## 2024-05-21 RX ADMIN — FENTANYL CITRATE 50 MCG: 50 INJECTION, SOLUTION INTRAMUSCULAR; INTRAVENOUS at 08:23

## 2024-05-21 RX ADMIN — FENTANYL CITRATE 25 MCG: 50 INJECTION, SOLUTION INTRAMUSCULAR; INTRAVENOUS at 09:35

## 2024-05-21 RX ADMIN — PROPOFOL 150 MG: 10 INJECTION, EMULSION INTRAVENOUS at 07:29

## 2024-05-21 RX ADMIN — ROCURONIUM BROMIDE 50 MG: 50 INJECTION, SOLUTION INTRAVENOUS at 07:29

## 2024-05-21 RX ADMIN — OXYCODONE HYDROCHLORIDE 10 MG: 5 SOLUTION ORAL at 08:39

## 2024-05-21 RX ADMIN — FENTANYL CITRATE 25 MCG: 50 INJECTION, SOLUTION INTRAMUSCULAR; INTRAVENOUS at 09:10

## 2024-05-21 RX ADMIN — LIDOCAINE HYDROCHLORIDE 80 MG: 20 INJECTION, SOLUTION EPIDURAL; INFILTRATION; INTRACAUDAL at 07:29

## 2024-05-21 RX ADMIN — FENTANYL CITRATE 25 MCG: 50 INJECTION, SOLUTION INTRAMUSCULAR; INTRAVENOUS at 08:55

## 2024-05-21 RX ADMIN — CEFAZOLIN 2 G: 1 INJECTION, POWDER, FOR SOLUTION INTRAMUSCULAR; INTRAVENOUS at 07:32

## 2024-05-21 RX ADMIN — SODIUM CHLORIDE, POTASSIUM CHLORIDE, SODIUM LACTATE AND CALCIUM CHLORIDE: 600; 310; 30; 20 INJECTION, SOLUTION INTRAVENOUS at 06:51

## 2024-05-21 ASSESSMENT — PAIN DESCRIPTION - PAIN TYPE
TYPE: SURGICAL PAIN
TYPE: CHRONIC PAIN
TYPE: SURGICAL PAIN;CHRONIC PAIN
TYPE: SURGICAL PAIN

## 2024-05-21 ASSESSMENT — FIBROSIS 4 INDEX: FIB4 SCORE: 0.94

## 2024-05-21 ASSESSMENT — PAIN SCALES - GENERAL: PAIN_LEVEL: 2

## 2024-05-21 NOTE — ANESTHESIA TIME REPORT
Anesthesia Start and Stop Event Times       Date Time Event    5/21/2024 0710 Ready for Procedure     0724 Anesthesia Start     0838 Anesthesia Stop          Responsible Staff  05/21/24      Name Role Begin End    Caden Higuera M.D. Anesth 0724 0838          Overtime Reason:  no overtime (within assigned shift)    Comments:

## 2024-05-21 NOTE — ANESTHESIA PREPROCEDURE EVALUATION
Case: [id number] Date/Time: 05/21/24 0715    Procedure: ROBOTIC ASSISTED TOTAL LAPAROSCOPIC HYSTERECTOMY, RIGHT AND OR LEFT SALPINGECTOMY, POSSIBLE RIGHT AND OR LEFT OOPHORECTOMY, CYSTOSCOPY    Pre-op diagnosis: COLON CANCER    Location: [facility] OR 14 / SURGERY [facility]    Surgeons: Gladys Zuniga D.O.          65 yo F w/ GERD, cervical cancer, h/o PONV  Relevant Problems   GI   (positive) GERD (gastroesophageal reflux disease)       Physical Exam    Airway   Mallampati: II  TM distance: >3 FB  Neck ROM: full       Cardiovascular - normal exam  Rhythm: regular  Rate: normal  (-) murmur     Dental - normal exam           Pulmonary - normal exam  Breath sounds clear to auscultation     Abdominal    Neurological - normal exam                   Anesthesia Plan    ASA 2       Plan - general       Airway plan will be ETT          Induction: intravenous    Postoperative Plan: Postoperative administration of opioids is intended.    Pertinent diagnostic labs and testing reviewed    Informed Consent:    Anesthetic plan and risks discussed with patient.    Use of blood products discussed with: patient whom consented to blood products.

## 2024-05-21 NOTE — OP REPORT
PREOPERATIVE DIAGNOSES:   64 y.o.  with Bull syndrome     POSTOPERATIVE DIAGNOSES:   Same      SURGEON: Gladys Zuniga DO     ASSISTANT: Silvia TANNER      PROCEDURES PERFORMED:   Robotic Assisted total laparoscopic hysterectomy, bilateral salpingo-oophorectomy, cystoscopy     ANESTHESIA: General endotracheal anesthesia.      ANESTHESIOLOGIST: Caden Higuera MD      ESTIMATED BLOOD LOSS: 25 mL      URINE OUTPUT: 100 mL of clear urine at the end of procedure.      FINDINGS: Normal-appearing external female genitalia, on bimanual exam uterus is mid position, small, mobile, no adnexal masses.  Upon speculum exam vagina is pink with flatttened rugae, normal cervix, uterus sounded to 6cm.  Upon entry into the peritoneal cavity normal-appearing upper abdominal anatomy is noted.  Normal uterus, normal ovaries and tubes bilaterally.  On cystoscopy at the conclusion of the case normal-appearing bladder is intact without any masses or lesions.  Bilateral ureteral efflux is noted.      COMPLICATIONS: none      PROCEDURE IN DETAIL: Patient is identified in the preoperative area.  Procedure and name are verified.  Informed consent is verified and patient reports all of her questions have been answered to her satisfaction.  The patient was then taken to the operating room and transferred to the OR table in supine position.  Serial compression devices were activated.  General endotracheal anesthesia was induced without difficulty.  The patient was placed in dorsal lithotomy position with her arms tucked at each side with careful attention to pad all flexion points.  A timeout was performed in which the patient's identity and procedures to be performed were all agreed upon by all members present. She was then prepped and draped in the usual sterile fashion. Preoperative antibiotics had been given. Examination under anesthesia was performed and a Mendes catheter was placed under sterile technique.  A speculum was  placed into the vagina single-tooth tenaculum was used to grasp the cervix.  The cervix was sounded to 6 cm and dilated to accommodate the uterine manipulator.  Surgeon then changed gloves and attention was turned to the abdominal portion of the case.   A Veress needle was used to enter the peritoneal cavity with an opening pressure of 2 mmHg.  Pressure was then turned to high flow and the abdomen was insufflated with CO2 gas at a pressure of 15 mmHg and pneumoperitoneum was maintained.  Approximately 5 cc of quarter percent Marcaine was injected into the base of the umbilicus. An umbilical incision was then created to accommodate an 8 millimeter robotic trocar. The 8 millimeter robotic trocar was inserted into the abdomen. The scope was introduced to confirm intraperitoneal placement with no injuries. The remainder of the trocars were then all placed under direct visualization after injection with marcaine. The patient was placed in steep Trendelenburg positioning to assist with visualization of the pelvis and the robot was docked per the routine. The surgeon moved to the console. Bilateral ureters were visualized transperitoneally to be coursing in the normal anatomic position.  The peritoneum lateral to the infundibulopelvic ligament on the right is incised in a parallel fashion to gain entry into the retroperitoneal space.  The ureter is well-visualized.  A window was created under the infundibulopelvic ligament to isolate the vessels.  The vessels were then coagulated and transected.  Hemostasis is maintained.  This dissection was then carried down to the level of the round ligament.   The round ligament was then taken down with monopolar cautery. This allowed entry into the retroperitoneal space. The vaginal assistant then placed the uterus on cephalad traction so that the ring impression could be seen within the vagina. The leafs of the broad ligament were then carefully dissected.  This was performed  anteriorly first to create the bladder flap and drop the bladder down off of the anterior surface of the uterus and cervix. The vesicouterine space was easily identified and the bladder was dropped off of the uterus and cervix until the surface of the ring impression could be seen. The posterior leaf was then dissected above the uterosacral ligaments.  The uterine arteries were then skeletonized. The uterine artery were taken with bipolar cautery to create a pedicle.  The pubocervical fascia was identified anteriorly and posteriorly.  The superior branch was then transected. The uterine artery pedicle was further delineated and coagulated and then transected.  Similar procedure was then carried out on the  left.  The bladder was further dissected off the pubocervical fascia.   A circumferential colpotomy was created around the vaginal mucosa. Once the colpotomy was completed, the cervix was brought down through the vagina. The uterus, cervix, and manipulator were all removed from the vagina.  Uterus, cervix, and bilateral tubes were sent to pathology. The cuff was irrigated.  The cuff was noted to be hemostatic. The bladder was ensured to be far enough off the anterior vaginal tissue to assist with vaginal cuff closureand some further dissection as performed where necessary. The vaginal cuff was closed with running suture of 0-vicryl beginning at the left apex.  A 0-monocryl stratifix suture was anchored at the right apex and then run to the other apex to form a second imbricating layer.  This imbricating layer incorporated the uterosacral ligaments. Pelvis and cuff were all irrigated. All surgical sites were noted to be hemostatic. The robot was then undocked and backed safely away from the patient.  Surgeon re scrubbed.      The arteaga was removed from the bladder and attention was turned to the cystoscopy.  Cystoscopy was performed with a full bladder survey revealing no foreign objects in the bladder nor any  defects.  Bilateral ureteral reflux was noted.  The Mendes was left out.  Vaginal cuff was inspected and confirmed to be well approximated and hemostatic.  No vaginal lacerations noted.      Gloves were changed and the skin incisions were closed with Dermabond. Sponge, needle, instrument, and lap counts were correct x2. Patient tolerated the procedure well and went to recovery room in stable condition.         ____________________________________   Gladys Zuniga DO FACOG

## 2024-05-21 NOTE — DISCHARGE INSTRUCTIONS
HOME CARE INSTRUCTIONS    ACTIVITY: Rest and take it easy for the first 24 hours.  A responsible adult is recommended to remain with you during that time.  It is normal to feel sleepy.  We encourage you to not do anything that requires balance, judgment or coordination.    FOR 24 HOURS DO NOT:  Drive, operate machinery or run household appliances.  Drink beer or alcoholic beverages.  Make important decisions or sign legal documents.    SPECIAL INSTRUCTIONS: see above    MEDICATIONS: Resume taking daily medication.  Take prescribed pain medication with food.  If no medication is prescribed, you may take non-aspirin pain medication if needed.  PAIN MEDICATION CAN BE VERY CONSTIPATING.  Take a stool softener or laxative such as senokot, pericolace, or milk of magnesia if needed.    Prescriptions as directed.  Last pain medication given at 08:29 am.    A follow-up appointment should be arranged with your doctor; call to schedule.    You should CALL YOUR PHYSICIAN if you develop:  Fever greater than 101 degrees F.  Pain not relieved by medication, or persistent nausea or vomiting.  Excessive bleeding (blood soaking through dressing) or unexpected drainage from the wound.  Extreme redness or swelling around the incision site, drainage of pus or foul smelling drainage.  Inability to urinate or empty your bladder within 8 hours.  Problems with breathing or chest pain.    You should call 911 if you develop problems with breathing or chest pain.  If you are unable to contact your doctor or surgical center, you should go to the nearest emergency room or urgent care center.  Physician's telephone #: [phone]    MILD FLU-LIKE SYMPTOMS ARE NORMAL.  YOU MAY EXPERIENCE GENERALIZED MUSCLE ACHES, THROAT IRRITATION, HEADACHE AND/OR SOME NAUSEA.    If any questions arise, call your doctor.  If your doctor is not available, please feel free to call the Surgical Center at [phone].  The Center is open Monday through Friday from  7AM to 7PM.      A registered nurse may call you a few days after your surgery to see how you are doing after your procedure.    You may also receive a survey in the mail within the next two weeks and we ask that you take a few moments to complete the survey and return it to us.  Our goal is to provide you with very good care and we value your comments.     Depression / Suicide Risk    As you are discharged from this [facility] facility, it is important to learn how to keep safe from harming yourself.    Recognize the warning signs:  Abrupt changes in personality, positive or negative- including increase in energy   Giving away possessions  Change in eating patterns- significant weight changes-  positive or negative  Change in sleeping patterns- unable to sleep or sleeping all the time   Unwillingness or inability to communicate  Depression  Unusual sadness, discouragement and loneliness  Talk of wanting to die  Neglect of personal appearance   Rebelliousness- reckless behavior  Withdrawal from people/activities they love  Confusion- inability to concentrate     If you or a loved one observes any of these behaviors or has concerns about self-harm, here's what you can do:  Talk about it- your feelings and reasons for harming yourself  Remove any means that you might use to hurt yourself (examples: pills, rope, extension cords, firearm)  Get professional help from the community (Mental Health, Substance Abuse, psychological counseling)  Do not be alone:Call your Safe Contact- someone whom you trust who will be there for you.  Call your local CRISIS HOTLINE 573-1618 or 788-583-1911  Call your local Children's Mobile Crisis Response Team [place] (202) 889-7020 or www.[employer]  Call the toll free National Suicide Prevention Hotlines   National Suicide Prevention Lifeline 206-140-EGQQ (5972)  National Hope Line Network 800-SUICIDE (000-6737)    I acknowledge receipt and understanding of these Home Care  instructions.

## 2024-05-21 NOTE — ANESTHESIA POSTPROCEDURE EVALUATION
Patient: Adelia Durant    Procedure Summary       Date: 05/21/24 Room / Location: [place] [address] / SURGERY [facility]    Anesthesia Start: 0724 Anesthesia Stop: 0838    Procedure: ROBOTIC ASSISTED TOTAL LAPAROSCOPIC HYSTERECTOMY, BILATERAL SALPINGECTOMY,BILATERAL OOPHORECTOMY, CYSTOSCOPY (Pelvis) Diagnosis: (machado syndrome)    Surgeons: Gladys Zuniga D.O. Responsible Provider: Caden Higuera M.D.    Anesthesia Type: general ASA Status: 2            Final Anesthesia Type: general  Last vitals  BP   Blood Pressure : 135/62    Temp   36.3 °C (97.4 °F)    Pulse   79   Resp   14    SpO2   95 %      Anesthesia Post Evaluation    Patient location during evaluation: PACU  Patient participation: complete - patient participated  Level of consciousness: awake  Pain score: 2    Airway patency: patent  Anesthetic complications: no  Cardiovascular status: hemodynamically stable  Respiratory status: acceptable  Hydration status: acceptable    PONV: none          No notable events documented.     Nurse Pain Score: 3 (NPRS)

## 2024-05-21 NOTE — ANESTHESIA PROCEDURE NOTES
Airway    Date/Time: 5/21/2024 7:31 AM    Performed by: Caden Higuera M.D.  Authorized by: Caden Higuera M.D.    Location:  OR  Urgency:  Elective  Indications for Airway Management:  Anesthesia      Spontaneous Ventilation: absent    Sedation Level:  Deep  Preoxygenated: Yes    Patient Position:  Sniffing  Mask Difficulty Assessment:  1 - vent by mask  Final Airway Type:  Endotracheal airway  Final Endotracheal Airway:  ETT  Cuffed: Yes    Technique Used for Successful ETT Placement:  Direct laryngoscopy  Devices/Methods Used in Placement:  Anterior pressure/BURP    Insertion Site:  Oral  Blade Type:  Candace  Laryngoscope Blade/Videolaryngoscope Blade Size:  3  ETT Size (mm):  7.0  Measured from:  Teeth  ETT to Teeth (cm):  2  Placement Verified by: auscultation and capnometry    Cormack-Lehane Classification:  Grade I - full view of glottis  Number of Attempts at Approach:  1

## 2024-05-21 NOTE — OR NURSING
Patient AAOx4, calm with c/o 7/10 mid lower abdominal and mid lower back discomfort post op. Medicated per MAR, patient states pain is improving. VSS, afebrile, room air 91% breathing even and unlabored. Abdomen soft, non distended, lap site dressings x4 CDI, ice applied. Patients nausea improved with zofran, tolerating ginger ale and popsicle. Patients spouse updated on status via text message.

## 2024-05-21 NOTE — PROGRESS NOTES
1015 Arrived from PACU NAD noted. Vital signs stable. 5 lap sites noted CDI.   1130 bladder scan complete 14cc of urine noted.  1148 Discharged home with spouse. Verbalized understanding of discharge instructions. Transported via wheelchair to [facility] without incident.

## 2024-05-24 ENCOUNTER — APPOINTMENT (OUTPATIENT)
Dept: RADIOLOGY | Facility: MEDICAL CENTER | Age: 64
End: 2024-05-24
Attending: OBSTETRICS & GYNECOLOGY
Payer: MEDICARE

## 2024-06-11 ENCOUNTER — OFFICE VISIT (OUTPATIENT)
Dept: MEDICAL GROUP | Facility: PHYSICIAN GROUP | Age: 64
End: 2024-06-11
Payer: MEDICARE

## 2024-06-11 VITALS
HEIGHT: 64 IN | DIASTOLIC BLOOD PRESSURE: 90 MMHG | BODY MASS INDEX: 25.95 KG/M2 | HEART RATE: 72 BPM | RESPIRATION RATE: 14 BRPM | WEIGHT: 152 LBS | OXYGEN SATURATION: 95 % | SYSTOLIC BLOOD PRESSURE: 152 MMHG | TEMPERATURE: 97.9 F

## 2024-06-11 DIAGNOSIS — G89.29 CHRONIC INTRACTABLE HEADACHE, UNSPECIFIED HEADACHE TYPE: ICD-10-CM

## 2024-06-11 DIAGNOSIS — R51.9 CHRONIC INTRACTABLE HEADACHE, UNSPECIFIED HEADACHE TYPE: ICD-10-CM

## 2024-06-11 DIAGNOSIS — K21.9 GASTROESOPHAGEAL REFLUX DISEASE WITHOUT ESOPHAGITIS: Chronic | ICD-10-CM

## 2024-06-11 DIAGNOSIS — I10 PRIMARY HYPERTENSION: ICD-10-CM

## 2024-06-11 PROBLEM — Z15.09 HEREDITARY NONPOLYPOSIS COLORECTAL CANCER SYNDROME: Status: ACTIVE | Noted: 2023-10-04

## 2024-06-11 PROBLEM — F32.9 MAJOR DEPRESSIVE DISORDER, SINGLE EPISODE, UNSPECIFIED: Status: ACTIVE | Noted: 2024-06-11

## 2024-06-11 PROBLEM — R10.9 ABDOMINAL PAIN: Status: ACTIVE | Noted: 2024-06-11

## 2024-06-11 PROBLEM — K57.92 DIVERTICULITIS: Status: ACTIVE | Noted: 2024-06-11

## 2024-06-11 PROCEDURE — 3077F SYST BP >= 140 MM HG: CPT

## 2024-06-11 PROCEDURE — 99214 OFFICE O/P EST MOD 30 MIN: CPT

## 2024-06-11 PROCEDURE — 3080F DIAST BP >= 90 MM HG: CPT

## 2024-06-11 RX ORDER — NAPROXEN 500 MG/1
500 TABLET ORAL 2 TIMES DAILY WITH MEALS
Qty: 60 TABLET | Refills: 1 | Status: SHIPPED | OUTPATIENT
Start: 2024-06-11

## 2024-06-11 RX ORDER — ONDANSETRON 4 MG/1
4 TABLET, FILM COATED ORAL EVERY 6 HOURS PRN
COMMUNITY
Start: 2024-05-20 | End: 2024-06-11

## 2024-06-11 RX ORDER — LISINOPRIL AND HYDROCHLOROTHIAZIDE 12.5; 1 MG/1; MG/1
1 TABLET ORAL DAILY
Qty: 90 TABLET | Refills: 1 | Status: SHIPPED | OUTPATIENT
Start: 2024-06-11

## 2024-06-11 RX ORDER — OXYCODONE HYDROCHLORIDE 5 MG/1
TABLET ORAL
COMMUNITY
Start: 2024-05-20 | End: 2024-06-11

## 2024-06-11 RX ORDER — IBUPROFEN 800 MG/1
TABLET ORAL
COMMUNITY
Start: 2024-05-20 | End: 2024-06-11

## 2024-06-11 RX ORDER — DIPHENHYDRAMINE HCL 25 MG
CAPSULE ORAL
COMMUNITY
End: 2024-06-11

## 2024-06-11 ASSESSMENT — ENCOUNTER SYMPTOMS
HEADACHES: 1
DIZZINESS: 0
EYE DISCHARGE: 1
PALPITATIONS: 0

## 2024-06-11 ASSESSMENT — PATIENT HEALTH QUESTIONNAIRE - PHQ9
1. LITTLE INTEREST OR PLEASURE IN DOING THINGS: NOT AT ALL
2. FEELING DOWN, DEPRESSED, IRRITABLE, OR HOPELESS: NOT AT ALL
SUM OF ALL RESPONSES TO PHQ9 QUESTIONS 1 AND 2: 0

## 2024-06-11 ASSESSMENT — FIBROSIS 4 INDEX: FIB4 SCORE: 0.94

## 2024-06-11 NOTE — ASSESSMENT & PLAN NOTE
Chronic, unstable. 152/90 in clinic today. Reports she has been having worsening headaches, worse with exercise, waking up with headaches.  Denies vision changes, palpitations, worsening chest pain. Reports she has ongoing chronic heartburn for which she takes antacids.     Has experienced some weight gain in the past year with back pain, recent surgery, feels this may be causing increase in bp.    Encouraged home bp monitoring. Given symptoms, will start lisinopril/hctz 10/12.5 mg dialy.  F/u 4 weeks for reevaluation

## 2024-06-11 NOTE — PROGRESS NOTES
"Subjective:     CC: Diagnoses of Primary hypertension, Chronic intractable headache, unspecified headache type, and Gastroesophageal reflux disease without esophagitis were pertinent to this visit.    Pt presents today reporting worsening headaches over the past couple months.     Noticing increase in blood pressures. Does not have home readings available.    5/21/24 had GEO BSO with cystoscopy    HPI:   Adelia presents today with    Problem   Diverticulitis   Major Depressive Disorder, Single Episode, Unspecified   Abdominal Pain   Primary Hypertension   Chronic Headaches   Hereditary Nonpolyposis Colorectal Cancer Syndrome   Gerd (Gastroesophageal Reflux Disease)   Diverticulosis       ROS:  Review of Systems   Eyes:  Positive for discharge (watery eyes with headaches).   Cardiovascular:  Negative for chest pain, palpitations and leg swelling.   Neurological:  Positive for headaches. Negative for dizziness.   All other systems reviewed and are negative.      Objective:     Exam:  BP (!) 152/90 (BP Location: Left arm, Patient Position: Sitting, BP Cuff Size: Adult)   Pulse 72   Temp 36.6 °C (97.9 °F) (Temporal)   Resp 14   Ht 1.626 m (5' 4\")   Wt 68.9 kg (152 lb)   LMP 07/27/2006   SpO2 95%   BMI 26.09 kg/m²  Body mass index is 26.09 kg/m².    Physical Exam  Vitals reviewed.   Constitutional:       General: She is not in acute distress.     Appearance: Normal appearance. She is not ill-appearing.   HENT:      Head: Normocephalic and atraumatic.   Cardiovascular:      Rate and Rhythm: Normal rate.      Pulses: Normal pulses.   Pulmonary:      Effort: Pulmonary effort is normal. No respiratory distress.   Skin:     General: Skin is warm and dry.      Findings: No rash.   Neurological:      General: No focal deficit present.      Mental Status: She is alert and oriented to person, place, and time.   Psychiatric:         Mood and Affect: Mood normal.         Behavior: Behavior normal.         Assessment & " Plan:     64 y.o. female with the following -     Problem List Items Addressed This Visit          Family Medicine Problems    Primary hypertension     Chronic, unstable. 152/90 in clinic today. Reports she has been having worsening headaches, worse with exercise, waking up with headaches.  Denies vision changes, palpitations, worsening chest pain. Reports she has ongoing chronic heartburn for which she takes antacids.     Has experienced some weight gain in the past year with back pain, recent surgery, feels this may be causing increase in bp.    Encouraged home bp monitoring. Given symptoms, will start lisinopril/hctz 10/12.5 mg dialy.  F/u 4 weeks for reevaluation          Relevant Medications    lisinopril-hydrochlorothiazide (PRINZIDE) 10-12.5 MG per tablet    Chronic headaches     Chronic, unstable. Reports sharp stabbing headaches, causing eyes to water. Reports pain moves from one side to the other. Has had history of mitigating migraines in the past which were during menopause. Reports in the past month headaches have increased since having hysterectomy. Reports intensity 6/10.     If no improvement with conservative management nsaid, avoiding triggers, bp management  Consider CT head for worsening headaches         Relevant Medications    naproxen (NAPROSYN) 500 MG Tab       Other    GERD (gastroesophageal reflux disease) (Chronic)     Chronic, ongoing. Taking tagamet BID, tums, probiotic, sodium bicarb for symptoms. Reports ppi caused joint pain- unable to tolerate.              Patient was educated in proper administration of medication(s) ordered today including safety, possible SE, risks, benefits, rationale and alternatives to therapy.   Supportive care, differential diagnoses, and indications for immediate follow-up discussed with patient.    Pathogenesis of diagnosis discussed including typical length and natural progression.    Instructed to return to clinic or nearest emergency department for any  change in condition, further concerns, or worsening of symptoms.  Patient states understanding of the plan of care and discharge instructions.      Return in about 4 weeks (around 7/9/2024) for Blood Pressure, headaches.    Please note that this dictation was created using voice recognition software. I have made every reasonable attempt to correct obvious errors, but I expect that there are errors of grammar and possibly content that I did not discover before finalizing the note.

## 2024-06-11 NOTE — ASSESSMENT & PLAN NOTE
Chronic, ongoing. Taking tagamet BID, tums, probiotic, sodium bicarb for symptoms. Reports ppi caused joint pain- unable to tolerate.

## 2024-06-11 NOTE — ASSESSMENT & PLAN NOTE
Chronic, unstable. Reports sharp stabbing headaches, causing eyes to water. Reports pain moves from one side to the other. Has had history of mitigating migraines in the past which were during menopause. Reports in the past month headaches have increased since having hysterectomy. Reports intensity 6/10.     If no improvement with conservative management nsaid, avoiding triggers, bp management  Consider CT head for worsening headaches

## 2024-08-02 DIAGNOSIS — G89.29 CHRONIC INTRACTABLE HEADACHE, UNSPECIFIED HEADACHE TYPE: ICD-10-CM

## 2024-08-02 DIAGNOSIS — R51.9 CHRONIC INTRACTABLE HEADACHE, UNSPECIFIED HEADACHE TYPE: ICD-10-CM

## 2024-08-05 RX ORDER — NAPROXEN 500 MG/1
500 TABLET ORAL 2 TIMES DAILY WITH MEALS
Qty: 60 TABLET | Refills: 0 | Status: SHIPPED | OUTPATIENT
Start: 2024-08-05 | End: 2024-08-20

## 2024-08-20 ENCOUNTER — OFFICE VISIT (OUTPATIENT)
Dept: MEDICAL GROUP | Facility: PHYSICIAN GROUP | Age: 64
End: 2024-08-20
Payer: MEDICARE

## 2024-08-20 VITALS
HEIGHT: 64 IN | SYSTOLIC BLOOD PRESSURE: 130 MMHG | WEIGHT: 151 LBS | BODY MASS INDEX: 25.78 KG/M2 | DIASTOLIC BLOOD PRESSURE: 80 MMHG | OXYGEN SATURATION: 100 % | TEMPERATURE: 97.6 F | RESPIRATION RATE: 16 BRPM | HEART RATE: 75 BPM

## 2024-08-20 DIAGNOSIS — N95.2 VAGINAL ATROPHY: ICD-10-CM

## 2024-08-20 DIAGNOSIS — G89.29 CHRONIC RIGHT-SIDED LOW BACK PAIN WITH RIGHT-SIDED SCIATICA: Chronic | ICD-10-CM

## 2024-08-20 DIAGNOSIS — M54.41 CHRONIC RIGHT-SIDED LOW BACK PAIN WITH RIGHT-SIDED SCIATICA: Chronic | ICD-10-CM

## 2024-08-20 DIAGNOSIS — E78.2 MIXED HYPERLIPIDEMIA: ICD-10-CM

## 2024-08-20 DIAGNOSIS — R51.9 INTRACTABLE EPISODIC HEADACHE, UNSPECIFIED HEADACHE TYPE: ICD-10-CM

## 2024-08-20 DIAGNOSIS — Z15.09 PMS2-RELATED LYNCH SYNDROME (HNPCC4): ICD-10-CM

## 2024-08-20 DIAGNOSIS — R79.89 ELEVATED TSH: ICD-10-CM

## 2024-08-20 DIAGNOSIS — Z23 NEED FOR VACCINATION: ICD-10-CM

## 2024-08-20 DIAGNOSIS — F33.42 RECURRENT MAJOR DEPRESSIVE DISORDER, IN FULL REMISSION (HCC): ICD-10-CM

## 2024-08-20 DIAGNOSIS — I10 PRIMARY HYPERTENSION: ICD-10-CM

## 2024-08-20 PROCEDURE — 3075F SYST BP GE 130 - 139MM HG: CPT

## 2024-08-20 PROCEDURE — G0009 ADMIN PNEUMOCOCCAL VACCINE: HCPCS

## 2024-08-20 PROCEDURE — 3079F DIAST BP 80-89 MM HG: CPT

## 2024-08-20 PROCEDURE — 90677 PCV20 VACCINE IM: CPT

## 2024-08-20 PROCEDURE — 99214 OFFICE O/P EST MOD 30 MIN: CPT | Mod: 25

## 2024-08-20 RX ORDER — ESTRADIOL 0.1 MG/G
CREAM VAGINAL
COMMUNITY
Start: 2024-07-16

## 2024-08-20 ASSESSMENT — PATIENT HEALTH QUESTIONNAIRE - PHQ9
6. FEELING BAD ABOUT YOURSELF - OR THAT YOU ARE A FAILURE OR HAVE LET YOURSELF OR YOUR FAMILY DOWN: NOT AL ALL
9. THOUGHTS THAT YOU WOULD BE BETTER OFF DEAD, OR OF HURTING YOURSELF: NOT AT ALL
1. LITTLE INTEREST OR PLEASURE IN DOING THINGS: NOT AT ALL
4. FEELING TIRED OR HAVING LITTLE ENERGY: NOT AT ALL
SUM OF ALL RESPONSES TO PHQ9 QUESTIONS 1 AND 2: 0
5. POOR APPETITE OR OVEREATING: NOT AT ALL
SUM OF ALL RESPONSES TO PHQ QUESTIONS 1-9: 0
7. TROUBLE CONCENTRATING ON THINGS, SUCH AS READING THE NEWSPAPER OR WATCHING TELEVISION: NOT AT ALL
3. TROUBLE FALLING OR STAYING ASLEEP OR SLEEPING TOO MUCH: NOT AT ALL
8. MOVING OR SPEAKING SO SLOWLY THAT OTHER PEOPLE COULD HAVE NOTICED. OR THE OPPOSITE, BEING SO FIGETY OR RESTLESS THAT YOU HAVE BEEN MOVING AROUND A LOT MORE THAN USUAL: NOT AT ALL
2. FEELING DOWN, DEPRESSED, IRRITABLE, OR HOPELESS: NOT AT ALL

## 2024-08-20 ASSESSMENT — FIBROSIS 4 INDEX: FIB4 SCORE: 0.94

## 2024-08-20 ASSESSMENT — ENCOUNTER SYMPTOMS: HEADACHES: 1

## 2024-08-20 NOTE — ASSESSMENT & PLAN NOTE
Chronic, intermittent. Reports intermittent stabbing headaches, which last less than 30 seconds, followed by watering of eye. Reports headaches will be on either side, alleviated by drinking cold water.

## 2024-08-20 NOTE — PROGRESS NOTES
"Subjective:     CC: Diagnoses of PMS2-related Bull syndrome (HNPCC4), Need for vaccination, Recurrent major depressive disorder, in full remission (HCC), Primary hypertension, Intractable episodic headache, unspecified headache type, Chronic right-sided low back pain with right-sided sciatica, Vaginal atrophy, Elevated TSH, and Mixed hyperlipidemia were pertinent to this visit.    Pt presents today for follow up.  She had hysterectomy 5/2024, doing well after surgery.    HPI:   Adelia presents today with    Problem   Recurrent Major Depressive Disorder, in Full Remission (Hcc)   Vaginal Atrophy   Primary Hypertension   Headache   Chronic Right-Sided Low Back Pain With Right-Sided Sciatica     ROS:  Review of Systems   Neurological:  Positive for headaches (ice pick headache, with ipsilateral lacrimation).   All other systems reviewed and are negative.      Objective:     Exam:  /80 (BP Location: Right arm, Patient Position: Sitting, BP Cuff Size: Adult)   Pulse 75   Temp 36.4 °C (97.6 °F) (Temporal)   Resp 16   Ht 1.626 m (5' 4\")   Wt 68.5 kg (151 lb)   LMP 07/27/2006   SpO2 100%   BMI 25.92 kg/m²  Body mass index is 25.92 kg/m².    Physical Exam  Vitals reviewed.   Constitutional:       General: She is not in acute distress.     Appearance: Normal appearance. She is not ill-appearing.   HENT:      Head: Normocephalic and atraumatic.   Cardiovascular:      Rate and Rhythm: Normal rate.      Pulses: Normal pulses.   Pulmonary:      Effort: Pulmonary effort is normal. No respiratory distress.   Skin:     General: Skin is warm and dry.      Findings: No rash.   Neurological:      General: No focal deficit present.      Mental Status: She is alert and oriented to person, place, and time.   Psychiatric:         Mood and Affect: Mood normal.         Behavior: Behavior normal.       Assessment & Plan:     64 y.o. female with the following -     Problem List Items Addressed This Visit          Family " Medicine Problems    Chronic right-sided low back pain with right-sided sciatica (Chronic)     Chronic, stable. Ongoing. Reports ongoing pain to low back and neck. Doing exercises and stretches. PT provided exercises which she continues.   Unable to take NSAID due to GERD.          Primary hypertension     Chronic, stable. Bp in clinic 130/80. Home readings 109-122/69-86. Reports frequent headaches  Continue lisinopril  10/12.5 mg daily.            Other    Headache     Chronic, intermittent. Reports intermittent stabbing headaches, which last less than 30 seconds, followed by watering of eye. Reports headaches will be on either side, alleviated by drinking cold water.           Recurrent major depressive disorder, in full remission (HCC)    Vaginal atrophy     Using topical estrogen for this, prescribed by GYN s/p hysterectomy 5/2024          Other Visit Diagnoses       PMS2-related Bull syndrome (HNPCC4)        Relevant Orders    MR-BREAST-BILATERAL-WITH & W/O    Need for vaccination        Relevant Orders    Pneumococcal Conjugate Vaccine 20-Valent (6 wks+)    Elevated TSH        Relevant Orders    THYROID PEROXIDASE  (TPO) AB    TSH WITH REFLEX TO FT4    Mixed hyperlipidemia        Relevant Orders    Lipid Profile            Patient was educated in proper administration of medication(s) ordered today including safety, possible SE, risks, benefits, rationale and alternatives to therapy.   Supportive care, differential diagnoses, and indications for immediate follow-up discussed with patient.    Pathogenesis of diagnosis discussed including typical length and natural progression.    Instructed to return to clinic or nearest emergency department for any change in condition, further concerns, or worsening of symptoms.  Patient states understanding of the plan of care and discharge instructions.    Return in about 6 months (around 2/20/2025), or if symptoms worsen or fail to improve, for wellness, Blood Pressure,  Labs.    Please note that this dictation was created using voice recognition software. I have made every reasonable attempt to correct obvious errors, but I expect that there are errors of grammar and possibly content that I did not discover before finalizing the note.

## 2024-08-20 NOTE — ASSESSMENT & PLAN NOTE
Chronic, stable. Bp in clinic 130/80. Home readings 109-122/69-86. Reports frequent headaches  Continue lisinopril  10/12.5 mg daily.

## 2024-08-20 NOTE — ASSESSMENT & PLAN NOTE
Chronic, stable. Ongoing. Reports ongoing pain to low back and neck. Doing exercises and stretches. PT provided exercises which she continues.   Unable to take NSAID due to GERD.

## 2024-08-28 DIAGNOSIS — I10 PRIMARY HYPERTENSION: ICD-10-CM

## 2024-08-28 RX ORDER — LISINOPRIL/HYDROCHLOROTHIAZIDE 10-12.5 MG
1 TABLET ORAL DAILY
Qty: 100 TABLET | Refills: 1 | Status: SHIPPED | OUTPATIENT
Start: 2024-08-28

## 2024-08-28 NOTE — TELEPHONE ENCOUNTER
Refill X 6 months, sent to pharmacy.Pt. Seen in the last 6 months per protocol.   Lab Results   Component Value Date/Time    SODIUM 140 05/17/2024 09:48 AM    POTASSIUM 4.3 05/17/2024 09:48 AM    CHLORIDE 102 05/17/2024 09:48 AM    CO2 26 05/17/2024 09:48 AM    GLUCOSE 93 05/17/2024 09:48 AM    BUN 14 05/17/2024 09:48 AM    CREATININE 0.91 05/17/2024 09:48 AM    CREATININE 0.76 03/26/2010 07:29 AM    BUNCREATRAT 13 03/26/2010 07:29 AM    GLOMRATE >59 03/26/2010 07:29 AM       Steph Bartlett, Clinical Pharmacist, CDE, CACP  Managed Care Pharmacist for [facility] and [facility]

## 2024-09-12 NOTE — ASSESSMENT & PLAN NOTE
BMI is 25.69 kg/m2. Provided education on heart healthy diet with adequate intake of fruits, vegetables, and whole grains. Reports that she has a home treadmill and tries to use it three times a week and does physical therapy exercises daily. Provided [employer] gym resources. Encourage 30 minutes of moderate exercise 3-4 times a week.    
Chronic, ongoing. Currently taking cimetidine daily and calcium carbonate as needed. Reports avoiding dietary triggers. Denies early satiety, unintentional weight loss, belching, and persistent burning pain in chest or upper abdomen. Followed by GI, Dr. Ozuna.     
Chronic, stable. Currently melatonin 5 mg nightly. Reports an average of 6-7 hours of sleep per night.   
Chronic, stable. Currently taking 1,000 units of cholecalciferol daily. Followed by primary care provider.     
Chronic, stable. No current medication use, previously managed on duloxetine. Reports that anxiety is stable.      
Chronic, stable. Reports completion of physical therapy in 2020. Denies need for over-the-counter medication and interference with activities of daily living. Previously followed by physiatry, Dr. King.    
100

## 2024-10-03 ENCOUNTER — PATIENT MESSAGE (OUTPATIENT)
Dept: HEALTH INFORMATION MANAGEMENT | Facility: OTHER | Age: 64
End: 2024-10-03

## 2024-10-10 ENCOUNTER — PATIENT MESSAGE (OUTPATIENT)
Dept: HEALTH INFORMATION MANAGEMENT | Facility: OTHER | Age: 64
End: 2024-10-10

## 2024-10-17 ENCOUNTER — HOSPITAL ENCOUNTER (OUTPATIENT)
Dept: RADIOLOGY | Facility: MEDICAL CENTER | Age: 64
End: 2024-10-17
Payer: MEDICARE

## 2024-10-17 DIAGNOSIS — Z15.09 PMS2-RELATED LYNCH SYNDROME (HNPCC4): ICD-10-CM

## 2024-10-17 PROCEDURE — 700117 HCHG RX CONTRAST REV CODE 255: Mod: JZ

## 2024-10-17 PROCEDURE — A9579 GAD-BASE MR CONTRAST NOS,1ML: HCPCS | Mod: JZ

## 2024-10-17 PROCEDURE — 77049 MRI BREAST C-+ W/CAD BI: CPT

## 2024-10-17 RX ADMIN — GADOTERIDOL 15 ML: 279.3 INJECTION, SOLUTION INTRAVENOUS at 18:58

## 2024-11-04 ENCOUNTER — OFFICE VISIT (OUTPATIENT)
Dept: DERMATOLOGY | Facility: IMAGING CENTER | Age: 64
End: 2024-11-04
Payer: MEDICARE

## 2024-11-04 DIAGNOSIS — D18.01 CHERRY ANGIOMA: ICD-10-CM

## 2024-11-04 DIAGNOSIS — L82.1 SK (SEBORRHEIC KERATOSIS): ICD-10-CM

## 2024-11-04 DIAGNOSIS — L81.4 LENTIGINES: ICD-10-CM

## 2024-11-04 DIAGNOSIS — D22.9 MULTIPLE NEVI: ICD-10-CM

## 2024-11-04 DIAGNOSIS — Z12.83 SKIN CANCER SCREENING: ICD-10-CM

## 2024-11-04 PROCEDURE — 99212 OFFICE O/P EST SF 10 MIN: CPT | Performed by: NURSE PRACTITIONER

## 2024-11-04 NOTE — PROGRESS NOTES
DERMATOLOGY NOTE  Follow up VISIT       Chief complaint: Follow-Up      Annual skin exam   Discuss itchy neck x 3 months       History of skin cancer: No  History of precancers/actinic keratoses: No  History of biopsies:No  History of blistering/severe sunburns:Yes, Details: Teens  Family history of skin cancer:No  Family history of atypical moles:Yes, Details: Maternal grandmother      Allergies   Allergen Reactions    Mushroom Extract Complex      Hives     Pcn [Penicillins]      Swelling     Tetanus Toxoid Hives and Rash     Body aches        MEDICATIONS:  Medications relevant to specialty reviewed.     REVIEW OF SYSTEMS:   Positive for skin (see HPI)  Negative for fevers and chills       EXAM:  LMP 07/27/2006   Constitutional: Well-developed, well-nourished, and in no distress.     A focused skin exam was performed including the affected areas of the upper body only--head/face, neck, trunk and BUEs. Notable findings on exam today listed below and/or in assessment/plan.     -sun exposed skin of trunk and b/l upper extremities and face with scattered clinically benign light brown reticulated macules all of which were morphologically similar and none of which were suspicious for skin cancer today on exam  Several scattered 1-3mm bright red macules and thin papules on the trunk and extremities  Several light brown medium brown skin-colored stuck-on waxy papules scattered on the trunk and extremities  Multiple light brown medium brown skin-colored macules papules scattered over the trunk >> extremities--All with benign-appearing pigment network patterns on dermoscopy        IMPRESSION / PLAN:    1. Lentigines  - Benign-appearing nature of lesions discussed during exam.   - Advised to continue to monitor for any return to clinic for new or concerning changes.      2. Cherry angioma  - Benign-appearing nature of lesions discussed during exam.   - Advised to continue to monitor for any return to clinic for new or  concerning changes.      3. Multiple nevi  - Benign-appearing nature of lesions discussed during exam.   - Advised to continue to monitor for any return to clinic for new or concerning changes.  - ABCDE's of melanoma discussed/handout given      4. SK (seborrheic keratosis)  - Benign-appearing nature of lesions discussed during exam.   - Advised to continue to monitor for any return to clinic for new or concerning changes.      5. Skin cancer screening  Skin cancer education  discussed importance of sun protective clothing, eyewear in addition to the use of broad spectrum sunscreen with SPF 30 or greater, as well as need for reapplication ~every 2 hours when exposed to UVR/handout given  discussed importance following up for any new or changing lesions as noted in handout given, but every 12 months exams in clinic in the setting of dermatologic history  ABCDE's of melanoma discussed/handout given      I have performed a physical exam and reviewed and updated ROS and Plan today (11/4/2024). In review of dermatology visit (10/30/2023), there are no changes except as documented above.     Please note that this dictation was created using voice recognition software. I have made every reasonable attempt to correct obvious errors, but I expect that there are errors of grammar and possibly content that I did not discover before finalizing the note.      Return to clinic in: Return in about 1 year (around 11/4/2025) for LARS. and as needed for any new or changing skin lesions.

## 2024-12-10 ENCOUNTER — HOSPITAL ENCOUNTER (OUTPATIENT)
Dept: LAB | Facility: MEDICAL CENTER | Age: 64
End: 2024-12-10
Payer: MEDICARE

## 2024-12-10 DIAGNOSIS — E78.2 MIXED HYPERLIPIDEMIA: ICD-10-CM

## 2024-12-10 DIAGNOSIS — R79.89 ELEVATED TSH: ICD-10-CM

## 2024-12-10 PROCEDURE — 86376 MICROSOMAL ANTIBODY EACH: CPT

## 2024-12-10 PROCEDURE — 84443 ASSAY THYROID STIM HORMONE: CPT

## 2024-12-10 PROCEDURE — 36415 COLL VENOUS BLD VENIPUNCTURE: CPT

## 2024-12-10 PROCEDURE — 80061 LIPID PANEL: CPT

## 2024-12-10 PROCEDURE — 84439 ASSAY OF FREE THYROXINE: CPT

## 2024-12-11 LAB
CHOLEST SERPL-MCNC: 209 MG/DL (ref 100–199)
HDLC SERPL-MCNC: 71 MG/DL
LDLC SERPL CALC-MCNC: 106 MG/DL
T4 FREE SERPL-MCNC: 0.92 NG/DL (ref 0.93–1.7)
THYROPEROXIDASE AB SERPL-ACNC: 262 IU/ML (ref 0–9)
TRIGL SERPL-MCNC: 162 MG/DL (ref 0–149)
TSH SERPL DL<=0.005 MIU/L-ACNC: 14.9 UIU/ML (ref 0.38–5.33)

## 2024-12-12 DIAGNOSIS — E06.3 HASHIMOTO THYROIDITIS: ICD-10-CM

## 2024-12-12 RX ORDER — LEVOTHYROXINE SODIUM 100 UG/1
100 TABLET ORAL
Qty: 100 TABLET | Refills: 1 | Status: SHIPPED | OUTPATIENT
Start: 2024-12-12 | End: 2024-12-30

## 2024-12-30 DIAGNOSIS — E06.3 HASHIMOTO THYROIDITIS: ICD-10-CM

## 2024-12-30 RX ORDER — LEVOTHYROXINE SODIUM 50 UG/1
50 TABLET ORAL
Qty: 90 TABLET | Refills: 1 | Status: SHIPPED | OUTPATIENT
Start: 2024-12-30

## 2025-01-03 ENCOUNTER — OFFICE VISIT (OUTPATIENT)
Dept: MEDICAL GROUP | Facility: PHYSICIAN GROUP | Age: 65
End: 2025-01-03
Payer: MEDICARE

## 2025-01-03 VITALS
TEMPERATURE: 98.2 F | HEIGHT: 64 IN | SYSTOLIC BLOOD PRESSURE: 104 MMHG | BODY MASS INDEX: 25.78 KG/M2 | OXYGEN SATURATION: 95 % | RESPIRATION RATE: 12 BRPM | HEART RATE: 68 BPM | DIASTOLIC BLOOD PRESSURE: 62 MMHG | WEIGHT: 151 LBS

## 2025-01-03 DIAGNOSIS — R51.9 INTRACTABLE EPISODIC HEADACHE, UNSPECIFIED HEADACHE TYPE: ICD-10-CM

## 2025-01-03 DIAGNOSIS — E06.3 HASHIMOTO'S THYROIDITIS: ICD-10-CM

## 2025-01-03 PROCEDURE — 99214 OFFICE O/P EST MOD 30 MIN: CPT

## 2025-01-03 PROCEDURE — 3074F SYST BP LT 130 MM HG: CPT

## 2025-01-03 PROCEDURE — 3078F DIAST BP <80 MM HG: CPT

## 2025-01-03 ASSESSMENT — PATIENT HEALTH QUESTIONNAIRE - PHQ9: CLINICAL INTERPRETATION OF PHQ2 SCORE: 0

## 2025-01-03 ASSESSMENT — FIBROSIS 4 INDEX: FIB4 SCORE: 0.94

## 2025-01-03 ASSESSMENT — ENCOUNTER SYMPTOMS: HEADACHES: 1

## 2025-01-03 NOTE — ASSESSMENT & PLAN NOTE
Chronic, unstable. Started levothyroxine 100 mcg 12/13/24- experienced dull daily headache since starting this. Has reduced dose to 50 then 25 by cutting pill.   Discussed trying every other or every 4 th day, instead of cutting small tablet  Discussed trial of brand name synthroid at 50 mcg daily, reports FH daughter and mother could only take brand name synthroid.    Rx previously sent in, but pt reports not covered by insurance.

## 2025-01-03 NOTE — ASSESSMENT & PLAN NOTE
Chronic, ongoing. Reports worsening headache since starting levothyroxine. Has reduced dose of medication, which has not helped. Reports this headache is across forehead and temples bilaterally. Reports since starting medication has had persistent dull headache daily.

## 2025-01-03 NOTE — PROGRESS NOTES
Verbal consent was acquired by the patient to use [employer] ambient listening note generation during this visit     Subjective:     CC: Diagnoses of Intractable episodic headache, unspecified headache type and Hashimoto's thyroiditis were pertinent to this visit.    HPI:   Adelia presents today with    History of Present Illness  The patient is a 64-year-old female who presents for evaluation of headaches.    She reports experiencing daily headaches, which she attributes to the initiation of her thyroid medication last month. She has been self-adjusting the dosage of this medication, reducing it to a quarter of the prescribed amount over the past 4 days. Despite this, she continues to experience headaches. She has a history of intermittent, stabbing headaches, typically lasting less than 30 seconds, accompanied by eye watering. These symptoms persist occasionally, but she now also experiences pain in the frontal and temporal regions. She describes herself as prone to headaches, with certain medications exacerbating this tendency. She rates her current pain level as zero but acknowledges experiencing stabbing pain in the past. She also notes that weather changes can trigger her headaches, although not on a daily basis. She believes that reducing her thyroid medication dosage to a quarter has been beneficial, as she continued to experience headaches even with half the dose. She has a scheduled follow-up appointment in 02/2025. Over-the-counter analgesics such as Tylenol have provided minimal relief, while Advil and Aleve have caused gastrointestinal discomfort. She has been managing her pain with Tylenol, taking less than 4000 mg daily.    She had an MRI done for her breast, and they said that her lymph nodes were abnormally large. She got a good bill of health. They said it is okay and just part of her immune system.    Supplemental Information  She has Bull syndrome.    FAMILY HISTORY  Her daughter had thyroid  "cancer. Her mother took Synthroid for thyroid issues.    ALLERGIES  The patient has no known allergies.    MEDICATIONS  Current: lisinopril hydrochlorothiazide, Tylenol    Current Outpatient Medications   Medication Sig Dispense Refill    levothyroxine (SYNTHROID) 50 MCG Tab Take 1 Tablet by mouth every morning on an empty stomach. 90 Tablet 1    lisinopril-hydrochlorothiazide (PRINZIDE) 10-12.5 MG per tablet Take 1 Tablet by mouth every day. 100 Tablet 1    estradiol (ESTRACE) 0.1 MG/GM vaginal cream Insert  into the vagina every 72 hours.      loratadine (CLARITIN) 10 MG Tab Take 10 mg by mouth as needed.      Ca Carbonate-Mag Hydroxide (ROLAIDS EXTRA STRENGTH) 675-135 MG Chew Tab Chew 1 Tablet 1/2 hour after dinner.      cimetidine (TAGAMET HB) 200 MG tablet Take 200 mg by mouth 2 times a day.      fexofenadine (ALLEGRA) 60 MG Tab Take 60 mg by mouth as needed.      SODIUM BICARBONATE PO Take 1 Tablet by mouth as needed. Pt unsure of dose      Flaxseed, Linseed, (FLAXSEED OIL) 1000 MG Cap Take 1 Capsule by mouth every morning.      Probiotic Product (PROBIOTIC-10 PO) Take 1 Tablet by mouth as needed.      Cholecalciferol (VITAMIN D-3) 1000 units Cap Take 1 Capsule by mouth every morning. 30 Cap 11    Multiple Vitamins-Minerals (CENTRUM SILVER PO) Take 1 Tablet by mouth every morning.       No current facility-administered medications for this visit.       Problem   Hashimoto's Thyroiditis   Headache     ROS:  Review of Systems   Neurological:  Positive for headaches.   All other systems reviewed and are negative.      Objective:     Exam:  /62 (BP Location: Left arm, Patient Position: Sitting, BP Cuff Size: Adult)   Pulse 68   Temp 36.8 °C (98.2 °F) (Temporal)   Resp 12   Ht 1.626 m (5' 4\")   Wt 68.5 kg (151 lb)   LMP 07/27/2006   SpO2 95%   BMI 25.92 kg/m²  Body mass index is 25.92 kg/m².    Physical Exam  Vitals reviewed.   Constitutional:       General: She is not in acute distress.     " Appearance: Normal appearance. She is not ill-appearing.   HENT:      Head: Normocephalic and atraumatic.   Cardiovascular:      Rate and Rhythm: Normal rate.      Pulses: Normal pulses.   Pulmonary:      Effort: Pulmonary effort is normal. No respiratory distress.   Skin:     General: Skin is warm and dry.      Findings: No rash.   Neurological:      General: No focal deficit present.      Mental Status: She is alert and oriented to person, place, and time.   Psychiatric:         Mood and Affect: Mood normal.         Behavior: Behavior normal.         Labs:    Latest Reference Range & Units 12/10/24 06:45   Cholesterol,Tot 100 - 199 mg/dL 209 (H)   Triglycerides 0 - 149 mg/dL 162 (H)   HDL >=40 mg/dL 71   LDL <100 mg/dL 106 (H)   TSH 0.380 - 5.330 uIU/mL 14.900 (H)   Free T-4 0.93 - 1.70 ng/dL 0.92 (L)   Microsomal -Tpo- Abs 0.0 - 9.0 IU/mL 262.0 (H)   (H): Data is abnormally high  (L): Data is abnormally low    Assessment & Plan:     64 y.o. female with the following -     Assessment & Plan  1. Headaches.  The headaches are not associated with hypertension, as her blood pressure readings are within normal limits. Given her history of medication sensitivities and the onset of headaches coinciding with the initiation of her thyroid medication, it is plausible that the headaches are a side effect of the medication. The frequency and history of her headaches do not necessitate an investigation for neurological causes at this juncture. She will continue her current thyroid medication regimen, but with a reduced frequency of every other day or every fourth day. A prescription for Synthroid has been provided, which she may opt to use if her headaches persist. A repeat TSH test will be conducted next week to monitor her thyroid function. If the headaches persist, worsen, or become more frequent, further evaluation will be considered.    2. Blood pressure management.  Her blood pressure is well-controlled on the current  regimen of lisinopril hydrochlorothiazide. She will continue this medication as prescribed.      Follow-up  The patient is scheduled for a follow-up visit in 02/2025.    Problem List Items Addressed This Visit       Headache     Chronic, ongoing. Reports worsening headache since starting levothyroxine. Has reduced dose of medication, which has not helped. Reports this headache is across forehead and temples bilaterally. Reports since starting medication has had persistent dull headache daily.              Hashimoto's thyroiditis     Chronic, unstable. Started levothyroxine 100 mcg 12/13/24- experienced dull daily headache since starting this. Has reduced dose to 50 then 25 by cutting pill.   Discussed trying every other or every 4 th day, instead of cutting small tablet  Discussed trial of brand name synthroid at 50 mcg daily, reports FH daughter and mother could only take brand name synthroid.    Rx previously sent in, but pt reports not covered by insurance.            Patient was educated in proper administration of medication(s) ordered today including safety, possible SE, risks, benefits, rationale and alternatives to therapy.   Supportive care, differential diagnoses, and indications for immediate follow-up discussed with patient.    Pathogenesis of diagnosis discussed including typical length and natural progression.    Instructed to return to clinic or nearest emergency department for any change in condition, further concerns, or worsening of symptoms.  Patient states understanding of the plan of care and discharge instructions.    Return in about 7 weeks (around 2/20/2025), or if symptoms worsen or fail to improve.    Please note that this dictation was created using voice recognition software. I have made every reasonable attempt to correct obvious errors, but I expect that there are errors of grammar and possibly content that I did not discover before finalizing the note.

## 2025-01-09 ENCOUNTER — HOSPITAL ENCOUNTER (OUTPATIENT)
Dept: LAB | Facility: MEDICAL CENTER | Age: 65
End: 2025-01-09
Payer: MEDICARE

## 2025-01-09 DIAGNOSIS — E06.3 HASHIMOTO THYROIDITIS: ICD-10-CM

## 2025-01-09 PROCEDURE — 36415 COLL VENOUS BLD VENIPUNCTURE: CPT

## 2025-01-09 PROCEDURE — 84443 ASSAY THYROID STIM HORMONE: CPT

## 2025-01-10 DIAGNOSIS — E06.3 HASHIMOTO'S THYROIDITIS: ICD-10-CM

## 2025-01-10 LAB — TSH SERPL-ACNC: 8.33 UIU/ML (ref 0.35–5.5)

## 2025-03-04 ENCOUNTER — HOSPITAL ENCOUNTER (OUTPATIENT)
Dept: LAB | Facility: MEDICAL CENTER | Age: 65
End: 2025-03-04
Payer: MEDICARE

## 2025-03-04 DIAGNOSIS — E06.3 HASHIMOTO'S THYROIDITIS: ICD-10-CM

## 2025-03-04 LAB
T4 FREE SERPL-MCNC: 1.32 NG/DL (ref 0.93–1.7)
TSH SERPL DL<=0.005 MIU/L-ACNC: 5.52 UIU/ML (ref 0.38–5.33)

## 2025-03-04 PROCEDURE — 36415 COLL VENOUS BLD VENIPUNCTURE: CPT

## 2025-03-04 PROCEDURE — 84443 ASSAY THYROID STIM HORMONE: CPT

## 2025-03-04 PROCEDURE — 84439 ASSAY OF FREE THYROXINE: CPT

## 2025-03-05 ENCOUNTER — RESULTS FOLLOW-UP (OUTPATIENT)
Dept: MEDICAL GROUP | Facility: PHYSICIAN GROUP | Age: 65
End: 2025-03-05

## 2025-03-11 DIAGNOSIS — I10 PRIMARY HYPERTENSION: ICD-10-CM

## 2025-03-12 ENCOUNTER — APPOINTMENT (OUTPATIENT)
Dept: MEDICAL GROUP | Facility: PHYSICIAN GROUP | Age: 65
End: 2025-03-12
Payer: MEDICARE

## 2025-03-12 VITALS
BODY MASS INDEX: 26.12 KG/M2 | SYSTOLIC BLOOD PRESSURE: 118 MMHG | OXYGEN SATURATION: 98 % | HEART RATE: 78 BPM | WEIGHT: 153 LBS | RESPIRATION RATE: 20 BRPM | HEIGHT: 64 IN | DIASTOLIC BLOOD PRESSURE: 62 MMHG | TEMPERATURE: 97.5 F

## 2025-03-12 DIAGNOSIS — E06.3 HASHIMOTO THYROIDITIS: ICD-10-CM

## 2025-03-12 DIAGNOSIS — I10 PRIMARY HYPERTENSION: ICD-10-CM

## 2025-03-12 DIAGNOSIS — Z12.39 BREAST CANCER SCREENING OTHER THAN MAMMOGRAM: ICD-10-CM

## 2025-03-12 DIAGNOSIS — E06.3 HASHIMOTO'S THYROIDITIS: ICD-10-CM

## 2025-03-12 DIAGNOSIS — R21 RASH: ICD-10-CM

## 2025-03-12 DIAGNOSIS — Z12.31 ENCOUNTER FOR SCREENING MAMMOGRAM FOR BREAST CANCER: ICD-10-CM

## 2025-03-12 PROCEDURE — 3078F DIAST BP <80 MM HG: CPT

## 2025-03-12 PROCEDURE — 3074F SYST BP LT 130 MM HG: CPT

## 2025-03-12 PROCEDURE — 99214 OFFICE O/P EST MOD 30 MIN: CPT

## 2025-03-12 RX ORDER — LEVOTHYROXINE SODIUM 50 UG/1
TABLET ORAL
Qty: 100 TABLET | Refills: 3 | Status: SHIPPED | OUTPATIENT
Start: 2025-03-12

## 2025-03-12 RX ORDER — TRIAMCINOLONE ACETONIDE 1 MG/G
1 OINTMENT TOPICAL 2 TIMES DAILY
Qty: 80 G | Refills: 1 | Status: SHIPPED | OUTPATIENT
Start: 2025-03-12

## 2025-03-12 ASSESSMENT — ENCOUNTER SYMPTOMS
HEADACHES: 1
HEARTBURN: 1

## 2025-03-12 ASSESSMENT — FIBROSIS 4 INDEX: FIB4 SCORE: 0.94

## 2025-03-12 NOTE — PROGRESS NOTES
Verbal consent was acquired by the patient to use "[employer] ambient listening note generation during this visit     Subjective:     CC: Diagnoses of Rash, Hashimoto's thyroiditis, Hashimoto thyroiditis, Encounter for screening mammogram for breast cancer, Breast cancer screening other than mammogram, and Primary hypertension were pertinent to this visit.    HPI:   Adelia presents today with    History of Present Illness      Current Outpatient Medications   Medication Sig Dispense Refill    triamcinolone acetonide (KENALOG) 0.1 % Ointment Apply 1 Each topically 2 times a day. 80 g 1    levothyroxine (SYNTHROID) 50 MCG Tab Take 50 mcg x5 days weekly, take 100 mcg x2 days weekly 100 Tablet 3    lisinopril-hydrochlorothiazide (PRINZIDE) 10-12.5 MG per tablet Take 1 Tablet by mouth every day. 100 Tablet 1    estradiol (ESTRACE) 0.1 MG/GM vaginal cream Insert  into the vagina every 72 hours.      loratadine (CLARITIN) 10 MG Tab Take 10 mg by mouth as needed.      Ca Carbonate-Mag Hydroxide (ROLAIDS EXTRA STRENGTH) 675-135 MG Chew Tab Chew 1 Tablet 1/2 hour after dinner.      cimetidine (TAGAMET HB) 200 MG tablet Take 200 mg by mouth 2 times a day.      fexofenadine (ALLEGRA) 60 MG Tab Take 60 mg by mouth as needed.      SODIUM BICARBONATE PO Take 1 Tablet by mouth as needed. Pt unsure of dose      Flaxseed, Linseed, (FLAXSEED OIL) 1000 MG Cap Take 1 Capsule by mouth every morning.      Probiotic Product (PROBIOTIC-10 PO) Take 1 Tablet by mouth as needed.      Cholecalciferol (VITAMIN D-3) 1000 units Cap Take 1 Capsule by mouth every morning. 30 Cap 11    Multiple Vitamins-Minerals (CENTRUM SILVER PO) Take 1 Tablet by mouth every morning.       No current facility-administered medications for this visit.       Problem   Rash   Hashimoto's Thyroiditis   Primary Hypertension     ROS:  Review of Systems   Gastrointestinal:  Positive for heartburn.   Skin:  Positive for itching and rash.   Neurological:  Positive for  "headaches.   All other systems reviewed and are negative.      Objective:     Exam:  /62 (BP Location: Right arm, Patient Position: Sitting, BP Cuff Size: Adult)   Pulse 78   Temp 36.4 °C (97.5 °F) (Temporal)   Resp 20   Ht 1.626 m (5' 4\")   Wt 69.4 kg (153 lb)   LMP 07/27/2006   SpO2 98%   BMI 26.26 kg/m²  Body mass index is 26.26 kg/m².    Physical Exam  Vitals reviewed.   Constitutional:       General: She is not in acute distress.     Appearance: Normal appearance. She is not ill-appearing.   HENT:      Head: Normocephalic and atraumatic.   Cardiovascular:      Rate and Rhythm: Normal rate.      Pulses: Normal pulses.   Pulmonary:      Effort: Pulmonary effort is normal. No respiratory distress.   Skin:     General: Skin is warm and dry.      Findings: No rash.   Neurological:      General: No focal deficit present.      Mental Status: She is alert and oriented to person, place, and time.   Psychiatric:         Mood and Affect: Mood normal.         Behavior: Behavior normal.       Labs:    Latest Reference Range & Units 12/10/24 06:45 01/09/25 06:34 03/04/25 06:54   Cholesterol,Tot 100 - 199 mg/dL 209 (H)     Triglycerides 0 - 149 mg/dL 162 (H)     HDL >=40 mg/dL 71     LDL <100 mg/dL 106 (H)     TSH 0.380 - 5.330 uIU/mL 14.900 (H) 8.330 (H) 5.520 (H)   Free T-4 0.93 - 1.70 ng/dL 0.92 (L)  1.32   Microsomal -Tpo- Abs 0.0 - 9.0 IU/mL 262.0 (H)     (H): Data is abnormally high  (L): Data is abnormally low    Assessment & Plan:     64 y.o. female with the following -     Assessment & Plan      Problem List Items Addressed This Visit          Family Medicine Problems    Primary hypertension    Chronic, stable. Bp in clinic today 118/62. Continue lisinopril-hctz 10/12.5 mg daily.            Other    Hashimoto's thyroiditis    Chronic, ongoing. Reports continues to experience headaches since starting levothyroxine. Is currently taking brand name medication, TSH continues to go down.   Will increase " synthroid to 50 mcg daily x5 days weekly, 100 mcg daily x 2 days weekly.  Recheck tsh 6-8 weeks         Relevant Medications    levothyroxine (SYNTHROID) 50 MCG Tab    Rash    Erythematous, raised, pruritic patches to abdomen, back with demarcated boarders.    Has tried topical emollients, without improved symptoms. Ongoing rash since December 2024, feels symptoms have worsened since onset.                   Relevant Medications    triamcinolone acetonide (KENALOG) 0.1 % Ointment     Other Visit Diagnoses         Hashimoto thyroiditis        Relevant Medications    levothyroxine (SYNTHROID) 50 MCG Tab    Other Relevant Orders    TSH    Referral to Endocrinology      Encounter for screening mammogram for breast cancer        Relevant Orders    MA-SCREENING MAMMO BILAT W/TOMOSYNTHESIS W/CAD      Breast cancer screening other than mammogram        Relevant Orders    MR-BREAST-BILATERAL-WITH & W/O          Patient was educated in proper administration of medication(s) ordered today including safety, possible SE, risks, benefits, rationale and alternatives to therapy.   Supportive care, differential diagnoses, and indications for immediate follow-up discussed with patient.    Pathogenesis of diagnosis discussed including typical length and natural progression.    Instructed to return to clinic or nearest emergency department for any change in condition, further concerns, or worsening of symptoms.  Patient states understanding of the plan of care and discharge instructions.    Return in about 6 months (around 9/12/2025).    Please note that this dictation was created using voice recognition software. I have made every reasonable attempt to correct obvious errors, but I expect that there are errors of grammar and possibly content that I did not discover before finalizing the note.

## 2025-03-12 NOTE — ASSESSMENT & PLAN NOTE
Chronic, ongoing. Reports continues to experience headaches since starting levothyroxine. Is currently taking brand name medication, TSH continues to go down.   Will increase synthroid to 50 mcg daily x5 days weekly, 100 mcg daily x 2 days weekly.  Recheck tsh 6-8 weeks

## 2025-03-12 NOTE — ASSESSMENT & PLAN NOTE
Erythematous, raised, pruritic patches to abdomen, back with demarcated boarders.    Has tried topical emollients, without improved symptoms. Ongoing rash since December 2024, feels symptoms have worsened since onset.

## 2025-03-13 RX ORDER — LISINOPRIL AND HYDROCHLOROTHIAZIDE 10; 12.5 MG/1; MG/1
1 TABLET ORAL DAILY
Qty: 100 TABLET | Refills: 1 | Status: SHIPPED | OUTPATIENT
Start: 2025-03-13

## 2025-03-15 ENCOUNTER — HOSPITAL ENCOUNTER (OUTPATIENT)
Facility: MEDICAL CENTER | Age: 65
End: 2025-03-15
Attending: NURSE PRACTITIONER
Payer: MEDICARE

## 2025-03-15 ENCOUNTER — HOSPITAL ENCOUNTER (OUTPATIENT)
Dept: LAB | Facility: MEDICAL CENTER | Age: 65
End: 2025-03-15
Attending: NURSE PRACTITIONER
Payer: MEDICARE

## 2025-03-15 ENCOUNTER — OFFICE VISIT (OUTPATIENT)
Dept: URGENT CARE | Facility: PHYSICIAN GROUP | Age: 65
End: 2025-03-15
Payer: MEDICARE

## 2025-03-15 VITALS
SYSTOLIC BLOOD PRESSURE: 108 MMHG | RESPIRATION RATE: 16 BRPM | TEMPERATURE: 98.5 F | WEIGHT: 153 LBS | OXYGEN SATURATION: 98 % | DIASTOLIC BLOOD PRESSURE: 60 MMHG | HEART RATE: 98 BPM | BODY MASS INDEX: 26.26 KG/M2

## 2025-03-15 DIAGNOSIS — R10.30 LOWER ABDOMINAL PAIN: ICD-10-CM

## 2025-03-15 DIAGNOSIS — R39.9 UTI SYMPTOMS: ICD-10-CM

## 2025-03-15 DIAGNOSIS — N39.0 URINARY TRACT INFECTION WITHOUT HEMATURIA, SITE UNSPECIFIED: ICD-10-CM

## 2025-03-15 LAB
ALBUMIN SERPL BCP-MCNC: 4.3 G/DL (ref 3.2–4.9)
ALBUMIN/GLOB SERPL: 1.2 G/DL
ALP SERPL-CCNC: 96 U/L (ref 30–99)
ALT SERPL-CCNC: 57 U/L (ref 2–50)
ANION GAP SERPL CALC-SCNC: 13 MMOL/L (ref 7–16)
APPEARANCE UR: NORMAL
AST SERPL-CCNC: 33 U/L (ref 12–45)
BASOPHILS # BLD AUTO: 0.4 % (ref 0–1.8)
BASOPHILS # BLD: 0.05 K/UL (ref 0–0.12)
BILIRUB SERPL-MCNC: 0.4 MG/DL (ref 0.1–1.5)
BILIRUB UR STRIP-MCNC: NEGATIVE MG/DL
BUN SERPL-MCNC: 18 MG/DL (ref 8–22)
CALCIUM ALBUM COR SERPL-MCNC: 9.8 MG/DL (ref 8.5–10.5)
CALCIUM SERPL-MCNC: 10 MG/DL (ref 8.5–10.5)
CHLORIDE SERPL-SCNC: 98 MMOL/L (ref 96–112)
CO2 SERPL-SCNC: 23 MMOL/L (ref 20–33)
COLOR UR AUTO: NORMAL
CREAT SERPL-MCNC: 1.13 MG/DL (ref 0.5–1.4)
EOSINOPHIL # BLD AUTO: 0.03 K/UL (ref 0–0.51)
EOSINOPHIL NFR BLD: 0.2 % (ref 0–6.9)
ERYTHROCYTE [DISTWIDTH] IN BLOOD BY AUTOMATED COUNT: 46.9 FL (ref 35.9–50)
GFR SERPLBLD CREATININE-BSD FMLA CKD-EPI: 53 ML/MIN/1.73 M 2
GLOBULIN SER CALC-MCNC: 3.5 G/DL (ref 1.9–3.5)
GLUCOSE SERPL-MCNC: 115 MG/DL (ref 65–99)
GLUCOSE UR STRIP.AUTO-MCNC: NEGATIVE MG/DL
HCT VFR BLD AUTO: 40.5 % (ref 37–47)
HGB BLD-MCNC: 13.4 G/DL (ref 12–16)
IMM GRANULOCYTES # BLD AUTO: 0.06 K/UL (ref 0–0.11)
IMM GRANULOCYTES NFR BLD AUTO: 0.5 % (ref 0–0.9)
KETONES UR STRIP.AUTO-MCNC: NEGATIVE MG/DL
LEUKOCYTE ESTERASE UR QL STRIP.AUTO: NORMAL
LIPASE SERPL-CCNC: 23 U/L (ref 11–82)
LYMPHOCYTES # BLD AUTO: 1.93 K/UL (ref 1–4.8)
LYMPHOCYTES NFR BLD: 14.7 % (ref 22–41)
MCH RBC QN AUTO: 32 PG (ref 27–33)
MCHC RBC AUTO-ENTMCNC: 33.1 G/DL (ref 32.2–35.5)
MCV RBC AUTO: 96.7 FL (ref 81.4–97.8)
MONOCYTES # BLD AUTO: 0.95 K/UL (ref 0–0.85)
MONOCYTES NFR BLD AUTO: 7.2 % (ref 0–13.4)
NEUTROPHILS # BLD AUTO: 10.14 K/UL (ref 1.82–7.42)
NEUTROPHILS NFR BLD: 77 % (ref 44–72)
NITRITE UR QL STRIP.AUTO: NEGATIVE
NRBC # BLD AUTO: 0 K/UL
NRBC BLD-RTO: 0 /100 WBC (ref 0–0.2)
PH UR STRIP.AUTO: 6.5 [PH] (ref 5–8)
PLATELET # BLD AUTO: 390 K/UL (ref 164–446)
PMV BLD AUTO: 9.6 FL (ref 9–12.9)
POTASSIUM SERPL-SCNC: 4.3 MMOL/L (ref 3.6–5.5)
PROT SERPL-MCNC: 7.8 G/DL (ref 6–8.2)
PROT UR QL STRIP: NORMAL MG/DL
RBC # BLD AUTO: 4.19 M/UL (ref 4.2–5.4)
RBC UR QL AUTO: NEGATIVE
SODIUM SERPL-SCNC: 134 MMOL/L (ref 135–145)
SP GR UR STRIP.AUTO: 1.02
UROBILINOGEN UR STRIP-MCNC: 0.2 MG/DL
WBC # BLD AUTO: 13.2 K/UL (ref 4.8–10.8)

## 2025-03-15 PROCEDURE — 3074F SYST BP LT 130 MM HG: CPT | Performed by: NURSE PRACTITIONER

## 2025-03-15 PROCEDURE — 3078F DIAST BP <80 MM HG: CPT | Performed by: NURSE PRACTITIONER

## 2025-03-15 PROCEDURE — 36415 COLL VENOUS BLD VENIPUNCTURE: CPT

## 2025-03-15 PROCEDURE — 81002 URINALYSIS NONAUTO W/O SCOPE: CPT | Performed by: NURSE PRACTITIONER

## 2025-03-15 PROCEDURE — 83690 ASSAY OF LIPASE: CPT

## 2025-03-15 PROCEDURE — 99214 OFFICE O/P EST MOD 30 MIN: CPT | Performed by: NURSE PRACTITIONER

## 2025-03-15 PROCEDURE — 85025 COMPLETE CBC W/AUTO DIFF WBC: CPT

## 2025-03-15 PROCEDURE — 87086 URINE CULTURE/COLONY COUNT: CPT

## 2025-03-15 PROCEDURE — 80053 COMPREHEN METABOLIC PANEL: CPT

## 2025-03-15 RX ORDER — SULFAMETHOXAZOLE AND TRIMETHOPRIM 800; 160 MG/1; MG/1
1 TABLET ORAL 2 TIMES DAILY
Qty: 14 TABLET | Refills: 0 | Status: SHIPPED | OUTPATIENT
Start: 2025-03-15 | End: 2025-03-22

## 2025-03-15 ASSESSMENT — ENCOUNTER SYMPTOMS
NAUSEA: 0
VOMITING: 0
ABDOMINAL PAIN: 1
CONSTIPATION: 0
CONSTITUTIONAL NEGATIVE: 1
RESPIRATORY NEGATIVE: 1
FEVER: 0
DIARRHEA: 0
BLOOD IN STOOL: 0
CARDIOVASCULAR NEGATIVE: 1

## 2025-03-15 ASSESSMENT — VISUAL ACUITY: OU: 1

## 2025-03-15 ASSESSMENT — FIBROSIS 4 INDEX: FIB4 SCORE: 0.94

## 2025-03-15 NOTE — PROGRESS NOTES
Subjective:     Adelia Durant is a 64 y.o. female who presents for UTI (X2days, Sweating, fever, nausea, possible UTI, pressure in bladder/lower abdominal area, sharp intermittent pain, more frequent urination)       UTI  This is a new problem. The problem has been gradually worsening. Associated symptoms include abdominal pain and urinary symptoms. Pertinent negatives include no chest pain, fever, nausea or vomiting.   Abdominal Pain  This is a new problem. Pertinent negatives include no constipation, diarrhea, dysuria, fever, nausea or vomiting.     Patient reports last Wednesday, she started to develop symptoms of lower abdominal discomfort.  Reports intermittent sharp pain.  Also reports urinary urgency as well as strong smell to her urine.  Denies burning or vaginal symptoms.    Reports a history of diverticulosis.  However, denies a history of diverticulitis.  However, this remains a concern of hers.    Review of Systems   Constitutional: Negative.  Negative for fever.   Respiratory: Negative.     Cardiovascular: Negative.  Negative for chest pain.   Gastrointestinal:  Positive for abdominal pain. Negative for blood in stool, constipation, diarrhea, nausea and vomiting.   Genitourinary:  Positive for urgency. Negative for dysuria.        Urine odor   All other systems reviewed and are negative.    Refer to HPI for additional details.    During this visit, appropriate PPE was worn, and hand hygiene was performed.    PMH:  has a past medical history of Adverse effect of anesthesia (2011), Arthritis (2019), Carcinoma in situ of cervix (1980), Cataracts, bilateral, Depression, Family history of breast cancer in mother, Family history of colon cancer, SAL (generalized anxiety disorder) (04/04/2017), GERD (gastroesophageal reflux disease), Heart burn (1978), Hiatus hernia syndrome (2018), Indigestion (1978), Joint stiffness of hand, Labile blood pressure (12/20/2019), Moderate episode of recurrent major  depressive disorder (HCC) (11/30/2016), Multiple gastric polyps (2008), PONV (postoperative nausea and vomiting), Situational depression (11/8/2016), and Snoring (1978).    MEDS:   Current Outpatient Medications:     sulfamethoxazole-trimethoprim (BACTRIM DS) 800-160 MG tablet, Take 1 Tablet by mouth 2 times a day for 7 days., Disp: 14 Tablet, Rfl: 0    lisinopril-hydrochlorothiazide (PRINZIDE) 10-12.5 MG per tablet, TAKE 1 TABLET BY MOUTH EVERY DAY, Disp: 100 Tablet, Rfl: 1    triamcinolone acetonide (KENALOG) 0.1 % Ointment, Apply 1 Each topically 2 times a day., Disp: 80 g, Rfl: 1    levothyroxine (SYNTHROID) 50 MCG Tab, Take 50 mcg x5 days weekly, take 100 mcg x2 days weekly, Disp: 100 Tablet, Rfl: 3    estradiol (ESTRACE) 0.1 MG/GM vaginal cream, Insert  into the vagina every 72 hours., Disp: , Rfl:     loratadine (CLARITIN) 10 MG Tab, Take 10 mg by mouth as needed., Disp: , Rfl:     Ca Carbonate-Mag Hydroxide (ROLAIDS EXTRA STRENGTH) 675-135 MG Chew Tab, Chew 1 Tablet 1/2 hour after dinner., Disp: , Rfl:     cimetidine (TAGAMET HB) 200 MG tablet, Take 200 mg by mouth 2 times a day., Disp: , Rfl:     fexofenadine (ALLEGRA) 60 MG Tab, Take 60 mg by mouth as needed., Disp: , Rfl:     SODIUM BICARBONATE PO, Take 1 Tablet by mouth as needed. Pt unsure of dose, Disp: , Rfl:     Flaxseed, Linseed, (FLAXSEED OIL) 1000 MG Cap, Take 1 Capsule by mouth every morning., Disp: , Rfl:     Probiotic Product (PROBIOTIC-10 PO), Take 1 Tablet by mouth as needed., Disp: , Rfl:     Cholecalciferol (VITAMIN D-3) 1000 units Cap, Take 1 Capsule by mouth every morning., Disp: 30 Cap, Rfl: 11    Multiple Vitamins-Minerals (CENTRUM SILVER PO), Take 1 Tablet by mouth every morning., Disp: , Rfl:     ALLERGIES:   Allergies   Allergen Reactions    Mushroom Extract Complex      Hives     Pcn [Penicillins]      Swelling     Tetanus Toxoid Hives and Rash     Body aches     SURGHX:   Past Surgical History:   Procedure Laterality Date     HYSTERECTOMY ROBOTIC XI  2024    Procedure: ROBOTIC ASSISTED TOTAL LAPAROSCOPIC HYSTERECTOMY, BILATERAL SALPINGECTOMY,BILATERAL OOPHORECTOMY, CYSTOSCOPY;  Surgeon: Gladys Zuniga D.O.;  Location: SURGERY [facility];  Service: Gyn Robotic    DILATION AND CURETTAGE CONE BIOPSY  1979    abnormal pap, carcinoma in situ    CATARACT EXTRACTION WITH IOL      Dr. Finn    COLONOSCOPY  , 2013    normal except for diverticulosis, Dr. Ozuna    OTHER           SOCHX:  reports that she quit smoking about 42 years ago. Her smoking use included cigarettes. She started smoking about 49 years ago. She has a 7 pack-year smoking history. She has never used smokeless tobacco. She reports current alcohol use of about 0.6 oz of alcohol per week. She reports that she does not currently use drugs.    FH: Per HPI as applicable/pertinent.      Objective:     /60 (BP Location: Right arm, Patient Position: Sitting, BP Cuff Size: Adult)   Pulse 98   Temp 36.9 °C (98.5 °F) (Temporal)   Resp 16   Wt 69.4 kg (153 lb)   LMP 2006   SpO2 98%   BMI 26.26 kg/m²     Physical Exam  Nursing note reviewed.   Constitutional:       General: She is not in acute distress.     Appearance: She is well-developed. She is not ill-appearing or toxic-appearing.   Eyes:      General: Vision grossly intact.   Cardiovascular:      Rate and Rhythm: Normal rate.   Pulmonary:      Effort: Pulmonary effort is normal. No respiratory distress.   Abdominal:      General: There is no distension.      Palpations: Abdomen is soft.      Tenderness: There is abdominal tenderness (Mild) in the right lower quadrant and left lower quadrant. There is no guarding or rebound.   Musculoskeletal:         General: No deformity. Normal range of motion.   Skin:     Coloration: Skin is not pale.   Neurological:      Mental Status: She is alert and oriented to person, place, and time.      Motor: No weakness.   Psychiatric:          Behavior: Behavior normal. Behavior is cooperative.     UA: slightly cloudy, trace protein, LE      Assessment/Plan:     1. Lower abdominal pain  - CT-ABDOMEN-PELVIS WITH; Future  - CBC WITH DIFFERENTIAL; Future  - Comp Metabolic Panel; Future  - ESTIMATED GFR; Future  - LIPASE; Future    2. UTI symptoms  - POCT Urinalysis  - URINE CULTURE(NEW); Future    3. Urinary tract infection without hematuria, site unspecified  - sulfamethoxazole-trimethoprim (BACTRIM DS) 800-160 MG tablet; Take 1 Tablet by mouth 2 times a day for 7 days.  Dispense: 14 Tablet; Refill: 0    Labs and CT pending to further evaluate symptoms.  Fortunately, earliest outpatient CT not available until Monday evening.  Scheduled.  Patient completing labs today.    In the meantime, urinalysis and symptoms suggest possible UTI.  Recommend empiric treatment.  Increase fluids.  Culture pending.    Monitor. Warning signs reviewed. Strict return/ER precautions advised.     Differential diagnosis, natural history, supportive care, over-the-counter symptom management per 's instructions, close monitoring, and indications for immediate follow-up discussed.     All questions answered. Patient agrees with the plan of care.    Discharge summary provided via [employer].    ADDENDUM 1731: Spoke with patient over the phone regarding recent lab results.

## 2025-03-17 ENCOUNTER — TELEPHONE (OUTPATIENT)
Dept: URGENT CARE | Facility: PHYSICIAN GROUP | Age: 65
End: 2025-03-17
Payer: MEDICARE

## 2025-03-17 ENCOUNTER — HOSPITAL ENCOUNTER (OUTPATIENT)
Dept: RADIOLOGY | Facility: MEDICAL CENTER | Age: 65
End: 2025-03-17
Attending: NURSE PRACTITIONER
Payer: MEDICARE

## 2025-03-17 ENCOUNTER — RESULTS FOLLOW-UP (OUTPATIENT)
Dept: URGENT CARE | Facility: PHYSICIAN GROUP | Age: 65
End: 2025-03-17

## 2025-03-17 DIAGNOSIS — R10.30 LOWER ABDOMINAL PAIN: ICD-10-CM

## 2025-03-17 LAB
BACTERIA UR CULT: NORMAL
SIGNIFICANT IND 70042: NORMAL
SITE SITE: NORMAL
SOURCE SOURCE: NORMAL

## 2025-03-17 PROCEDURE — 700117 HCHG RX CONTRAST REV CODE 255: Performed by: NURSE PRACTITIONER

## 2025-03-17 PROCEDURE — 74177 CT ABD & PELVIS W/CONTRAST: CPT

## 2025-03-17 RX ADMIN — IOHEXOL 100 ML: 350 INJECTION, SOLUTION INTRAVENOUS at 17:41

## 2025-03-18 NOTE — TELEPHONE ENCOUNTER
Spoke with patient over the phone regarding recent CT results. Acute sigmoid diverticulitis. Incidental right renal cyst which is likely benign.    Management of diverticulitis discussed including past and newer trends (I.e. antibiotics vs no antibiotics; continuing supportive care including liquid diet). Patient reports pain improving at this time. I recommended antibiotics, however, she prefers to hold off on antibiotics at this time. Ibuprofen for pain.    Monitor. Warning signs reviewed. Immediate return precautions advised.    Urine culture unremarkable. May stop Bactrim abx at this time.    Advised to follow up with PCP for routine/preventive care including colonoscopies.

## 2025-03-20 ENCOUNTER — PATIENT MESSAGE (OUTPATIENT)
Dept: HEALTH INFORMATION MANAGEMENT | Facility: OTHER | Age: 65
End: 2025-03-20

## 2025-03-21 NOTE — Clinical Note
REFERRAL APPROVAL NOTICE         Sent on March 21, 2025                   Diana Durant  [address] [place] [address]                   Dear Ms. Bhargav,    After a careful review of the medical information and benefit coverage, [facility] has processed your referral. See below for additional details.    If applicable, you must be actively enrolled with your insurance for coverage of the authorized service. If you have any questions regarding your coverage, please contact your insurance directly.    REFERRAL INFORMATION   Referral #:  [id number]  Referred-To Department    Referred-By Provider:  Endocrinology    KARUNA Moore   Endocrinology [facility]      [address]  [place] [id number] [phone] [address], [address]  [place] [address] [phone]    Referral Start Date:  03/12/2025  Referral End Date:   03/12/2026           SCHEDULING  If you do not already have an appointment, please call [phone] to make an appointment.   MORE INFORMATION  As a reminder, [facility] ownership has changed, meaning this location is now owned and operated by [facility]. As such, we want to clarify that our patients should expect to receive two separate bills for the services received at [facility] - one representing the [facility] facility fees as the owner of the establishment, and the other to represent the physician's services and subsequent fees. You can speak with your insurance carrier for a pricing estimate by calling the customer service number on the back of your card and ask about charges for a hospital outpatient visit.  If you do not already have a [employer] account, sign up at: [email or web address]  You can access your medical information, make appointments, see lab results, billing information, and more.  If you have questions regarding this referral, please contact  the [facility] Referrals department at:              [phone]. Monday - Friday 7:30AM - 5:00PM.      Sincerely,  [facility]

## 2025-03-27 ENCOUNTER — HOSPITAL ENCOUNTER (OUTPATIENT)
Dept: RADIOLOGY | Facility: MEDICAL CENTER | Age: 65
End: 2025-03-27
Payer: MEDICARE

## 2025-03-27 ENCOUNTER — RESULTS FOLLOW-UP (OUTPATIENT)
Dept: MEDICAL GROUP | Facility: PHYSICIAN GROUP | Age: 65
End: 2025-03-27

## 2025-03-27 DIAGNOSIS — Z12.31 ENCOUNTER FOR SCREENING MAMMOGRAM FOR BREAST CANCER: ICD-10-CM

## 2025-03-27 PROCEDURE — 77067 SCR MAMMO BI INCL CAD: CPT

## 2025-04-04 ASSESSMENT — PATIENT HEALTH QUESTIONNAIRE - PHQ9
1. LITTLE INTEREST OR PLEASURE IN DOING THINGS: NOT AT ALL
2. FEELING DOWN, DEPRESSED, IRRITABLE, OR HOPELESS: NOT AT ALL

## 2025-04-04 ASSESSMENT — ACTIVITIES OF DAILY LIVING (ADL): BATHING_REQUIRES_ASSISTANCE: 0

## 2025-04-04 ASSESSMENT — ENCOUNTER SYMPTOMS: GENERAL WELL-BEING: FAIR

## 2025-04-07 ENCOUNTER — OFFICE VISIT (OUTPATIENT)
Dept: FAMILY PLANNING/WOMEN'S HEALTH CLINIC | Facility: PHYSICIAN GROUP | Age: 65
End: 2025-04-07
Payer: MEDICARE

## 2025-04-07 VITALS
WEIGHT: 153 LBS | HEART RATE: 90 BPM | DIASTOLIC BLOOD PRESSURE: 76 MMHG | RESPIRATION RATE: 18 BRPM | OXYGEN SATURATION: 97 % | TEMPERATURE: 97.8 F | HEIGHT: 64 IN | SYSTOLIC BLOOD PRESSURE: 120 MMHG | BODY MASS INDEX: 26.12 KG/M2

## 2025-04-07 DIAGNOSIS — I10 PRIMARY HYPERTENSION: ICD-10-CM

## 2025-04-07 DIAGNOSIS — E66.3 OVERWEIGHT (BMI 25.0-29.9): ICD-10-CM

## 2025-04-07 DIAGNOSIS — K21.9 GASTROESOPHAGEAL REFLUX DISEASE WITHOUT ESOPHAGITIS: Chronic | ICD-10-CM

## 2025-04-07 DIAGNOSIS — E06.3 HASHIMOTO'S THYROIDITIS: ICD-10-CM

## 2025-04-07 DIAGNOSIS — M85.852 OSTEOPENIA OF NECK OF LEFT FEMUR: ICD-10-CM

## 2025-04-07 PROBLEM — F32.9 MAJOR DEPRESSIVE DISORDER, SINGLE EPISODE, UNSPECIFIED: Status: RESOLVED | Noted: 2024-06-11 | Resolved: 2025-04-07

## 2025-04-07 PROBLEM — F33.42 RECURRENT MAJOR DEPRESSIVE DISORDER, IN FULL REMISSION (HCC): Status: RESOLVED | Noted: 2024-08-20 | Resolved: 2025-04-07

## 2025-04-07 PROCEDURE — G0439 PPPS, SUBSEQ VISIT: HCPCS | Performed by: NURSE PRACTITIONER

## 2025-04-07 PROCEDURE — 3078F DIAST BP <80 MM HG: CPT | Performed by: NURSE PRACTITIONER

## 2025-04-07 PROCEDURE — 3074F SYST BP LT 130 MM HG: CPT | Performed by: NURSE PRACTITIONER

## 2025-04-07 PROCEDURE — 1126F AMNT PAIN NOTED NONE PRSNT: CPT | Performed by: NURSE PRACTITIONER

## 2025-04-07 SDOH — HEALTH STABILITY: PHYSICAL HEALTH: ON AVERAGE, HOW MANY MINUTES DO YOU ENGAGE IN EXERCISE AT THIS LEVEL?: 30 MIN

## 2025-04-07 SDOH — HEALTH STABILITY: PHYSICAL HEALTH: ON AVERAGE, HOW MANY DAYS PER WEEK DO YOU ENGAGE IN MODERATE TO STRENUOUS EXERCISE (LIKE A BRISK WALK)?: 3 DAYS

## 2025-04-07 SDOH — ECONOMIC STABILITY: HOUSING INSECURITY: AT ANY TIME IN THE PAST 12 MONTHS, WERE YOU HOMELESS OR LIVING IN A SHELTER (INCLUDING NOW)?: NO

## 2025-04-07 SDOH — ECONOMIC STABILITY: FOOD INSECURITY: HOW HARD IS IT FOR YOU TO PAY FOR THE VERY BASICS LIKE FOOD, HOUSING, MEDICAL CARE, AND HEATING?: NOT HARD AT ALL

## 2025-04-07 SDOH — ECONOMIC STABILITY: FOOD INSECURITY: WITHIN THE PAST 12 MONTHS, YOU WORRIED THAT YOUR FOOD WOULD RUN OUT BEFORE YOU GOT THE MONEY TO BUY MORE.: NEVER TRUE

## 2025-04-07 SDOH — ECONOMIC STABILITY: HOUSING INSECURITY: IN THE LAST 12 MONTHS, WAS THERE A TIME WHEN YOU WERE NOT ABLE TO PAY THE MORTGAGE OR RENT ON TIME?: NO

## 2025-04-07 SDOH — ECONOMIC STABILITY: FOOD INSECURITY: WITHIN THE PAST 12 MONTHS, THE FOOD YOU BOUGHT JUST DIDN'T LAST AND YOU DIDN'T HAVE MONEY TO GET MORE.: NEVER TRUE

## 2025-04-07 SDOH — ECONOMIC STABILITY: TRANSPORTATION INSECURITY: IN THE PAST 12 MONTHS, HAS LACK OF TRANSPORTATION KEPT YOU FROM MEDICAL APPOINTMENTS OR FROM GETTING MEDICATIONS?: NO

## 2025-04-07 ASSESSMENT — PAIN SCALES - GENERAL: PAINLEVEL_OUTOF10: NO PAIN

## 2025-04-07 ASSESSMENT — ACTIVITIES OF DAILY LIVING (ADL): LACK_OF_TRANSPORTATION: NO

## 2025-04-07 ASSESSMENT — FIBROSIS 4 INDEX: FIB4 SCORE: 0.74

## 2025-04-07 ASSESSMENT — PATIENT HEALTH QUESTIONNAIRE - PHQ9: CLINICAL INTERPRETATION OF PHQ2 SCORE: 0

## 2025-04-07 NOTE — ASSESSMENT & PLAN NOTE
Stable on tagamet/ rolaids.  Symptoms improved with rx. Pt reports dx of diverticulosis. Cont f/u with PCP/ gastroenterology  for ongoing monitoring and medication management

## 2025-04-07 NOTE — PROGRESS NOTES
Comprehensive Health Assessment Program     Adelia Durant is a 64 y.o. here for her comprehensive health assessment.    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 80 g 1    levothyroxine (SYNTHROID) 50 MCG Tab Take 50 mcg x5 days weekly, take 100 mcg x2 days weekly 100 Tablet 3    estradiol (ESTRACE) 0.1 MG/GM vaginal cream Insert  into the vagina every 72 hours.      loratadine (CLARITIN) 10 MG Tab Take 10 mg by mouth as needed.      Ca Carbonate-Mag Hydroxide (ROLAIDS EXTRA STRENGTH) 675-135 MG Chew Tab Chew 1 Tablet 1/2 hour after dinner.      cimetidine (TAGAMET HB) 200 MG tablet Take 200 mg by mouth 2 times a day.      fexofenadine (ALLEGRA) 60 MG Tab Take 60 mg by mouth as needed.      SODIUM BICARBONATE PO Take 1 Tablet by mouth as needed. Pt unsure of dose      Flaxseed, Linseed, (FLAXSEED OIL) 1000 MG Cap Take 1 Capsule by mouth every morning.      Probiotic Product (PROBIOTIC-10 PO) Take 1 Tablet by mouth as needed.      Cholecalciferol (VITAMIN  D-3) 1000 units Cap Take 1 Capsule by mouth every morning. 30 Cap 11    Multiple Vitamins-Minerals (CENTRUM SILVER PO) Take 1 Tablet by mouth every morning.       No current facility-administered medications for this visit.          Current supplements as per medication list.     Allergies:   Mushroom extract complex, Pcn [penicillins], and Tetanus toxoid  Social History     Tobacco Use    Smoking status: Former     Current packs/day: 0.00     Average packs/day: 0.6 packs/day for 10.9 years (7.0 ttl pk-yrs)     Types: Cigarettes     Start date: 1975     Quit date: 3/1/1983     Years since quittin.1    Smokeless tobacco: Never    Tobacco comments:     Quit    Vaping Use    Vaping status: Never Used   Substance Use Topics    Alcohol use: Yes     Alcohol/week: 0.6 oz     Types: 1 Glasses of wine per week     Comment: I have a glass of wine if  wake up in the middle of the night, once a week    Drug use: Not Currently     Family History   Problem Relation Age of Onset    Breast Cancer Mother     Cancer Mother 66        breast, lumpectomy; pleomorphic lobular carcinoma    Hypertension Mother     Heart Disease Mother         enlarged heart    GI Disease Mother         perforated diverticulitis, colostomy    Colorectal Cancer Father     Cancer Father 66        colon    Alcohol abuse Father     Bipolar disorder Father     Alcohol abuse Sister     Alcohol abuse Brother     Drug abuse Brother     Heart Disease Maternal Uncle 42        heart demise    Ovarian Cancer Maternal Grandmother     Stroke Maternal Grandmother     Cancer Maternal Grandmother         ovarian and gastric    Stroke Paternal Grandmother     Suicide Attempts Child     Tubal Cancer Neg Hx     Peritoneal Cancer Neg Hx     Diabetes Neg Hx     Hyperlipidemia Neg Hx      Adelia  has a past medical history of Adverse effect of anesthesia (), Arthritis (), Carcinoma in situ of cervix (), Cataracts, bilateral, Depression, Family history of  breast cancer in mother, Family history of colon cancer, SAL (generalized anxiety disorder) (2017), GERD (gastroesophageal reflux disease), Heart burn (), Hiatus hernia syndrome (), Indigestion (), Joint stiffness of hand, Labile blood pressure (2019), Moderate episode of recurrent major depressive disorder (HCC) (2016), Multiple gastric polyps (), PONV (postoperative nausea and vomiting), Situational depression (2016), and Snoring ().   Past Surgical History:   Procedure Laterality Date    HYSTERECTOMY ROBOTIC XI  2024    Procedure: ROBOTIC ASSISTED TOTAL LAPAROSCOPIC HYSTERECTOMY, BILATERAL SALPINGECTOMY,BILATERAL OOPHORECTOMY, CYSTOSCOPY;  Surgeon: Gladys Zuniga D.O.;  Location: SURGERY [facility];  Service: Gyn Robotic    DILATION AND CURETTAGE CONE BIOPSY  1979    abnormal pap, carcinoma in situ    CATARACT EXTRACTION WITH IOL      Dr. Finn    COLONOSCOPY  , 2013    normal except for diverticulosis, Dr. Ozuna    OTHER             Screening:  In the last six months have you experienced any leakage of urine? No    Depression Screening  Little interest or pleasure in doing things?  0 - not at all  Feeling down, depressed , or hopeless? 0 - not at all  Patient Health Questionnaire Score: 0     If depressive symptoms identified deferred to follow up visit unless specifically addressed in assessment and plan.    Interpretation of PHQ-9 Total Score   Score Severity   1-4 No Depression   5-9 Mild Depression   10-14 Moderate Depression   15-19 Moderately Severe Depression   20-27 Severe Depression    Screening for Cognitive Impairment  Do you or any of your friends or family members have any concern about your memory? No  Three Minute Recall (Village, Kitchen, Baby) 3/3    Danyel clock face with all 12 numbers and set the hands to show 10 minutes past 11.  Yes 5  Cognitive concerns identified deferred for follow up unless specifically  addressed in assessment and plan.    Fall Risk Assessment  Has the patient had two or more falls in the last year or any fall with injury in the last year?  No    Safety Assessment  Do you always wear your seatbelt?  Yes  Any changes to home needed to function safely? No  Difficulty hearing.  No  Patient counseled about all safety risks that were identified.    Functional Assessment ADLs  Are there any barriers preventing you from cooking for yourself or meeting nutritional needs?  No.    Are there any barriers preventing you from driving safely or obtaining transportation?  No.    Are there any barriers preventing you from using a telephone or calling for help?  No    Are there any barriers preventing you from shopping?  No.    Are there any barriers preventing you from taking care of your own finances?  No    Are there any barriers preventing you from managing your medications?  No    Are there any barriers preventing you from showering, bathing or dressing yourself? No    Are there any barriers preventing you from doing housework or laundry? No  Are there any barriers preventing you from using the toilet?No  Are you currently engaging in any exercise or physical activity?  Yes.      Self-Assessment of Health  What is your perception of your health? Fair    Do you sleep more than six hours a night? Yes    In the past 7 days, how much did pain keep you from doing your normal work? Some    Do you spend quality time with family or friends (virtually or in person)? Yes    Do you usually eat a heart healthy diet that constists of a variety of fruits, vegetables, whole grains and fiber? Yes    Do you eat foods high in fat and/or Fast Food more than three times per week? No    How concerned are you that your medical conditions are not being well managed? a little    Are you worried that in the next 2 months, you may not have stable housing that you own, rent, or stay in as part of a household? No      Advance Care  Planning  Do you have an Advance Directive, Living Will, Durable Power of , or POLST? Yes                 Health Maintenance Summary            Awaiting Completion       Mammogram (Yearly) Scheduled for 4/21/2025 03/27/2025  MA-SCREENING MAMMO BILAT W/TOMOSYNTHESIS W/CAD    03/27/2025  Order placed for MA-DIAGNOSTIC MAMMO RIGHT W/TOMOSYNTHESIS W/O CAD by Mary Castro    06/29/2023  MA-SCREENING MAMMO BILAT W/TOMOSYNTHESIS W/CAD    09/14/2021  MA-SCREENING MAMMO BILAT W/TOMOSYNTHESIS W/CAD    05/21/2020  MA-SCREENING MAMMO BILAT W/TOMOSYNTHESIS W/CAD     Only the first 5 history entries have been loaded, but more history exists.                    Upcoming       Annual Wellness Visit (Yearly) Next due on 4/7/2026 04/07/2025  Level of Service: CA ANNUAL WELLNESS VISIT-INCLUDES PPPS SUBSEQUE*    03/25/2024  Level of Service: CA ANNUAL WELLNESS VISIT-INCLUDES PPPS SUBSEQUE*    03/25/2024  Done - Comprehensive Health Assessment    06/20/2023  Level of Service: CA PREVENTIVE VISIT,EST,65 & OVER              Bone Density Scan (Every 5 Years) Next due on 7/28/2028 07/28/2023  DS-BONE DENSITY STUDY (DEXA)              Colorectal Cancer Screening (Colonoscopy - Every 5 Years) Next due on 10/4/2028      10/04/2023  AMB EXTERNAL COLONOSCOPY RESULTS    06/19/2018  REFERRAL TO GI FOR COLONOSCOPY                      Completed or No Longer Recommended       Influenza Vaccine (Series Information) Completed      10/02/2024  Outside Immunization: Influenza, High Dose    09/15/2023  Imm Admin: Influenza Vaccine Adult HD    10/25/2022  Imm Admin: Influenza Vaccine Quad Inj (Pf)    11/03/2021  Imm Admin: Influenza Vac Subunit Quad Inj (Pf)    09/28/2020  Imm Admin: Influenza Vaccine Quad Inj (Pf)      Only the first 5 history entries have been loaded, but more history exists.              Zoster (Shingles) Vaccines (Series Information) Completed      08/19/2021  Imm Admin: Zoster Vaccine Recombinant (RZV)  (SHINGRIX)    11/07/2020  Imm Admin: Zoster Vaccine Recombinant (RZV) (SHINGRIX)              COVID-19 Vaccine (Series Information) Completed      10/10/2024  Imm Admin: Covid-19 Mrna (Spikevax) Moderna 12+ Years    10/14/2023  Imm Admin: Comirnaty (Covid-19 Vaccine, Mrna, 1136-8726 Formula)    11/05/2022  Imm Admin: MODERNA BIVALENT BOOSTER SARS-COV-2 VACCINE (6+)    01/17/2022  Imm Admin: MODERNA SARS-COV-2 VACCINE (12+)    07/14/2021  Imm Admin: MODERNA SARS-COV-2 VACCINE (12+)      Only the first 5 history entries have been loaded, but more history exists.              Hepatitis C Screening  Completed      06/06/2020  Hepatitis C Antibody component of HEP C VIRUS ANTIBODY              Pneumococcal Vaccine: 50+ Years (Series Information) Completed      08/20/2024  Imm Admin: Pneumococcal Conjugate Vaccine (PCV20)    06/20/2023  Imm Admin: Pneumococcal polysaccharide vaccine (PPSV-23)              Hepatitis A Vaccine (Hep A) (Series Information) Aged Out      No completion history exists for this topic.              HPV Vaccines (Series Information) Aged Out     No completion history exists for this topic.              Polio Vaccine (Inactivated Polio) (Series Information) Aged Out     No completion history exists for this topic.              Meningococcal Immunization (Series Information) Aged Out     No completion history exists for this topic.              Cervical Cancer Screening  Discontinued        Frequency changed to Never automatically (Topic No Longer Applies)    04/24/2024  THINPREP PAP WITH HPV    08/19/2021  THINPREP PAP WITH HPV    08/19/2021  Pathology Gynecology Specimen    01/16/2017  THINPREP PAP, REFLEX HPV ON ASC-US AND ABOVE     Only the first 5 history entries have been loaded, but more history exists.            Hepatitis B Vaccine (Hep B)  Discontinued      02/16/2015  Imm Admin: Hepatitis B Vaccine Adolescent/Pediatric    10/20/2014  Imm Admin: Hepatitis B Vaccine Adolescent/Pediatric     "09/16/2014  Imm Admin: Hepatitis B Vaccine (Adol/Adult)    08/12/2014  Imm Admin: Hepatitis B Vaccine Adolescent/Pediatric              IMM DTaP/Tdap/Td Vaccine  Discontinued      No completion history exists for this topic.                            Patient Care Team:  KARUNA Moore as PCP - General (Nurse Practitioner Family)  KARUNA Moore as PCP - [facility] (Nurse Practitioner Family)  Jet Ozuna M.D. as Consulting Physician (Gastroenterology)  Elie Finn M.D. as Consulting Physician (Ophthalmology)      Financial Resource Strain: Low Risk  (4/7/2025)    Overall Financial Resource Strain (CARDIA)     Difficulty of Paying Living Expenses: Not hard at all      Transportation Needs: No Transportation Needs (4/7/2025)    PRAPARE - Transportation     Lack of Transportation (Medical): No     Lack of Transportation (Non-Medical): No      Food Insecurity: No Food Insecurity (4/7/2025)    Hunger Vital Sign     Worried About Running Out of Food in the Last Year: Never true     Ran Out of Food in the Last Year: Never true        Encounter Vitals  Temperature: 36.6 °C (97.8 °F)  Temp src: Temporal  Blood Pressure : 120/76  Pulse: 90  Respiration: 18  Pulse Oximetry: 97 %  Weight: 69.4 kg (153 lb)  Height: 162.6 cm (5' 4\")  BMI (Calculated): 26.26  Pain Score: No pain     ROS:  No fever, chills, nausea, vomiting, diarrhea, chest pain or shortness of breath. See HPI.    Physical Exam:  Constitutional: NAD  HENMT: NC/AT, OP clear, TM's clear, no lymphadenopathy, no thyromegaly.  No JVD. (-) carotid bruit   Cardiovascular: RRR, No murmur   Lungs: CTAB bilat   Extremities:  slight pedal edema +1   Skin: No legions notes  Neurologic: Alert & oriented     Assessment and Plan. The following treatment and monitoring plan is recommended:  Overweight (BMI 25.0-29.9)  Stable. BMI 26.26. Consider heart healthy diet and regular exercise. Cont f/u with PCP for ongoing monitoring and " discussion.    Hashimoto's thyroiditis  Stable. On levothyroxine. Patient has appt to establish with endocrinology 5/2025. Records review    Latest Reference Range & Units 01/09/25 06:34 03/04/25 06:54   TSH 0.380 - 5.330 uIU/mL 8.330 (H) 5.520 (H)   (H): Data is abnormally high  Cont f/u with PCP / endo for ongoing monitoring and medication management     GERD (gastroesophageal reflux disease)  Stable on tagamet/ rolaids.  Symptoms improved with rx. Pt reports dx of diverticulosis. Cont f/u with PCP/ gastroenterology  for ongoing monitoring and medication management      Osteopenia of neck of left femur  Stable. Records review BMD (7/28/23) reports T - 0.7/ Z 0.9 ( L spine); T -1.8/ Z 0.7 ( L femur) consider daily isometric exercise. Cont f/u with PCP for ongoing monitoring.       Services suggested: No services needed at this time  Health Care Screening: Age-appropriate preventive services recommended by USPTF and ACIP covered by Medicare were discussed today. Services ordered if indicated and agreed upon by the patient.  Referrals offered: Community-based lifestyle interventions to reduce health risks and promote self-management and wellness, fall prevention, nutrition, physical activity, tobacco-use cessation, weight loss, and mental health services as per orders if indicated.    Discussion today about general wellness and lifestyle habits:    Prevent falls and reduce trip hazards; Cautioned about securing or removing rugs.  Have a working fire alarm and carbon monoxide detector.  Engage in regular physical activity and social activities.    Follow-up: Return f/u with PCP as indicated.  HANDOUTS OFFERED     Exercises for seniors  Fitness benefit   [employer]

## 2025-04-07 NOTE — ASSESSMENT & PLAN NOTE
Stable. BMI 26.26. Consider heart healthy diet and regular exercise. Cont f/u with PCP for ongoing monitoring and discussion.

## 2025-04-07 NOTE — ASSESSMENT & PLAN NOTE
Stable. On levothyroxine. Patient has appt to establish with endocrinology 5/2025. Records review    Latest Reference Range & Units 01/09/25 06:34 03/04/25 06:54   TSH 0.380 - 5.330 uIU/mL 8.330 (H) 5.520 (H)   (H): Data is abnormally high  Cont f/u with PCP / endo for ongoing monitoring and medication management

## 2025-04-21 ENCOUNTER — HOSPITAL ENCOUNTER (OUTPATIENT)
Dept: RADIOLOGY | Facility: MEDICAL CENTER | Age: 65
End: 2025-04-21
Payer: MEDICARE

## 2025-04-21 DIAGNOSIS — R92.8 ABNORMAL MAMMOGRAM: ICD-10-CM

## 2025-04-21 PROCEDURE — 76642 ULTRASOUND BREAST LIMITED: CPT | Mod: RT

## 2025-04-21 PROCEDURE — G0279 TOMOSYNTHESIS, MAMMO: HCPCS

## 2025-04-22 ENCOUNTER — RESULTS FOLLOW-UP (OUTPATIENT)
Dept: MEDICAL GROUP | Facility: PHYSICIAN GROUP | Age: 65
End: 2025-04-22

## 2025-04-29 ENCOUNTER — HOSPITAL ENCOUNTER (OUTPATIENT)
Dept: LAB | Facility: MEDICAL CENTER | Age: 65
End: 2025-04-29
Payer: MEDICARE

## 2025-04-29 DIAGNOSIS — E06.3 HASHIMOTO THYROIDITIS: ICD-10-CM

## 2025-04-29 LAB — TSH SERPL-ACNC: 1.6 UIU/ML (ref 0.38–5.33)

## 2025-04-29 PROCEDURE — 36415 COLL VENOUS BLD VENIPUNCTURE: CPT

## 2025-04-29 PROCEDURE — 84443 ASSAY THYROID STIM HORMONE: CPT

## 2025-05-01 ENCOUNTER — RESULTS FOLLOW-UP (OUTPATIENT)
Dept: MEDICAL GROUP | Facility: PHYSICIAN GROUP | Age: 65
End: 2025-05-01

## 2025-05-05 ENCOUNTER — HOSPITAL ENCOUNTER (OUTPATIENT)
Dept: RADIOLOGY | Facility: MEDICAL CENTER | Age: 65
End: 2025-05-05
Payer: MEDICARE

## 2025-05-05 DIAGNOSIS — R92.8 ABNORMAL FINDINGS ON DIAGNOSTIC IMAGING OF BREAST: ICD-10-CM

## 2025-05-05 LAB — PATHOLOGY CONSULT NOTE: NORMAL

## 2025-05-05 PROCEDURE — 88305 TISSUE EXAM BY PATHOLOGIST: CPT | Performed by: STUDENT IN AN ORGANIZED HEALTH CARE EDUCATION/TRAINING PROGRAM

## 2025-05-05 PROCEDURE — 88305 TISSUE EXAM BY PATHOLOGIST: CPT | Mod: 26 | Performed by: STUDENT IN AN ORGANIZED HEALTH CARE EDUCATION/TRAINING PROGRAM

## 2025-05-05 PROCEDURE — A4648 IMPLANTABLE TISSUE MARKER: HCPCS

## 2025-05-06 NOTE — PROGRESS NOTES
New Patient Consult Note for Endocrinology  Referred by: KARUNA Moore    Reason for consult:     HPI:  Adelia Durant is a 64 y.o. female who was referred to endocrinology for hypothyroidism management.     alternate medication managment for hypothyroid- continues to experience headaches since starting levothyroxine/synthroid     Hypothyroidism:  - was diagnosed in 2023 - subclinical -> 12/2024 - clinical -> was started on LT4 replacement therapy  - main concern - worsening of headaches that she was initially contributing to her thyroid medication, was transitioned from levothyroxine -> Synthroid without significant improvement; now admits that she noted another factors provoking her headaches  - she also noted worsening of her rash  - neck discomfort - denies  - FHx: mother had hypothyroidism    Current therapy:  Synthroid 50 mcg - 1 tab Mo - Fr, 2 tab Sat-Sun ~ 64 mcg/day    Past Medical History:  Patient Active Problem List    Diagnosis Date Noted    Osteopenia of neck of left femur 04/07/2025    Rash 03/12/2025    Hashimoto's thyroiditis 01/03/2025    Vaginal atrophy 08/20/2024    Diverticulitis 06/11/2024    Abdominal pain 06/11/2024    Primary hypertension 06/11/2024    Headache 06/11/2024    Overweight (BMI 25.0-29.9) 03/25/2024    Hereditary nonpolyposis colorectal cancer syndrome 10/04/2023    Chronic right-sided low back pain with right-sided sciatica 12/20/2019    Vitamin D deficiency 05/15/2018    SAL (generalized anxiety disorder) 04/04/2017    Other insomnia 11/08/2016    GERD (gastroesophageal reflux disease) 02/17/2015    Diverticulosis 04/04/2013    History of carcinoma in situ of cervix uteri 07/27/2011     Past Surgical History:  Past Surgical History:   Procedure Laterality Date    HYSTERECTOMY ROBOTIC XI  5/21/2024    Procedure: ROBOTIC ASSISTED TOTAL LAPAROSCOPIC HYSTERECTOMY, BILATERAL SALPINGECTOMY,BILATERAL OOPHORECTOMY, CYSTOSCOPY;  Surgeon: Gladys OLMOS  GUNJAN Zuniga;  Location: SURGERY [facility];  Service: Gyn Robotic    DILATION AND CURETTAGE CONE BIOPSY  1979    abnormal pap, carcinoma in situ    CATARACT EXTRACTION WITH IOL      Dr. Finn    COLONOSCOPY  , 2013    normal except for diverticulosis, Dr. Ozuna    OTHER           Allergies:  Mushroom extract complex, Pcn [penicillins], and Tetanus toxoid    Social History:  Social History     Socioeconomic History    Marital status:      Spouse name: Not on file    Number of children: Not on file    Years of education: Not on file    Highest education level: Not on file   Occupational History    Not on file   Tobacco Use    Smoking status: Former     Current packs/day: 0.00     Average packs/day: 0.6 packs/day for 10.9 years (7.0 ttl pk-yrs)     Types: Cigarettes     Start date: 1975     Quit date: 3/1/1983     Years since quittin.2    Smokeless tobacco: Never    Tobacco comments:     Quit    Vaping Use    Vaping status: Never Used   Substance and Sexual Activity    Alcohol use: Yes     Alcohol/week: 0.6 oz     Types: 1 Glasses of wine per week     Comment: I have a glass of wine if  wake up in the middle of the night, once a week    Drug use: Not Currently    Sexual activity: Not Currently     Partners: Male     Comment: Occassionally   Other Topics Concern     Service No    Blood Transfusions No    Caffeine Concern No    Occupational Exposure No    Hobby Hazards No    Sleep Concern Yes    Stress Concern Yes    Weight Concern No    Special Diet No    Back Care Yes    Exercise Yes    Bike Helmet No    Seat Belt Yes    Self-Exams No   Social History Narrative    Not on file     Social Drivers of Health     Financial Resource Strain: Low Risk  (2025)    Overall Financial Resource Strain (CARDIA)     Difficulty of Paying Living Expenses: Not hard at all   Food Insecurity: No Food Insecurity (2025)    Hunger Vital Sign     Worried About Running Out of Food  in the Last Year: Never true     Ran Out of Food in the Last Year: Never true   Transportation Needs: No Transportation Needs (4/7/2025)    PRAPARE - Transportation     Lack of Transportation (Medical): No     Lack of Transportation (Non-Medical): No   Physical Activity: Insufficiently Active (4/7/2025)    Exercise Vital Sign     Days of Exercise per Week: 3 days     Minutes of Exercise per Session: 30 min   Stress: No Stress Concern Present (3/25/2024)    [employer] - Occupational Stress Questionnaire     Feeling of Stress : Not at all   Social Connections: Socially Integrated (3/25/2024)    Social Connection and Isolation Panel [NHANES]     Frequency of Communication with Friends and Family: Once a week     Frequency of Social Gatherings with Friends and Family: Three times a week     Attends [employer] Services: 1 to 4 times per year     Active Member of Clubs or Organizations: Yes     Attends Club or Organization Meetings: 1 to 4 times per year     Marital Status:    Intimate Partner Violence: Not on file   Housing Stability: Unknown (4/7/2025)    Housing Stability Vital Sign     Unable to Pay for Housing in the Last Year: No     Number of Times Moved in the Last Year: Not on file     Homeless in the Last Year: No     Family History:  Family History   Problem Relation Age of Onset    Breast Cancer Mother     Cancer Mother 66        breast, lumpectomy; pleomorphic lobular carcinoma    Hypertension Mother     Heart Disease Mother         enlarged heart    GI Disease Mother         perforated diverticulitis, colostomy    Colorectal Cancer Father     Cancer Father 66        colon    Alcohol abuse Father     Bipolar disorder Father     Alcohol abuse Sister     Alcohol abuse Brother     Drug abuse Brother     Heart Disease Maternal Uncle 42        heart demise    Ovarian Cancer Maternal Grandmother     Stroke Maternal Grandmother     Cancer Maternal Grandmother         ovarian and  "gastric    Stroke Paternal Grandmother     Suicide Attempts Child     Tubal Cancer Neg Hx     Peritoneal Cancer Neg Hx     Diabetes Neg Hx     Hyperlipidemia Neg Hx      Medications:  Current Outpatient Medications:     lisinopril-hydrochlorothiazide (PRINZIDE) 10-12.5 MG per tablet, TAKE 1 TABLET BY MOUTH EVERY DAY, Disp: 100 Tablet, Rfl: 1    triamcinolone acetonide (KENALOG) 0.1 % Ointment, Apply 1 Each topically 2 times a day., Disp: 80 g, Rfl: 1    levothyroxine (SYNTHROID) 50 MCG Tab, Take 50 mcg x5 days weekly, take 100 mcg x2 days weekly, Disp: 100 Tablet, Rfl: 3    estradiol (ESTRACE) 0.1 MG/GM vaginal cream, Insert  into the vagina every 72 hours., Disp: , Rfl:     loratadine (CLARITIN) 10 MG Tab, Take 10 mg by mouth as needed., Disp: , Rfl:     Ca Carbonate-Mag Hydroxide (ROLAIDS EXTRA STRENGTH) 675-135 MG Chew Tab, Chew 1 Tablet 1/2 hour after dinner., Disp: , Rfl:     cimetidine (TAGAMET HB) 200 MG tablet, Take 200 mg by mouth 2 times a day., Disp: , Rfl:     fexofenadine (ALLEGRA) 60 MG Tab, Take 60 mg by mouth as needed., Disp: , Rfl:     SODIUM BICARBONATE PO, Take 1 Tablet by mouth as needed. Pt unsure of dose, Disp: , Rfl:     Flaxseed, Linseed, (FLAXSEED OIL) 1000 MG Cap, Take 1 Capsule by mouth every morning., Disp: , Rfl:     Probiotic Product (PROBIOTIC-10 PO), Take 1 Tablet by mouth as needed., Disp: , Rfl:     Cholecalciferol (VITAMIN D-3) 1000 units Cap, Take 1 Capsule by mouth every morning., Disp: 30 Cap, Rfl: 11    Multiple Vitamins-Minerals (CENTRUM SILVER PO), Take 1 Tablet by mouth every morning., Disp: , Rfl:     Physical Examination:   Vital signs: /72   Pulse 81   Ht 1.626 m (5' 4\") Comment: pt stated  Wt 68.8 kg (151 lb 11.2 oz)   LMP 07/27/2006   SpO2 97%   BMI 26.04 kg/m²   General: No distress, cooperative, well dressed and well nourished.   Eyes: No scleral icterus or discharge  ENMT: Normal on external inspection of nose, lips, No nasal drainage   Neck: No abnormal " masses on inspection  Resp: Normal effort  CVS: Regular rate and rhythm  Extremities: No edema bilateral extremities  Neuro: Alert and oriented  Skin: No rash, No Ulcers  Psych: Normal mood and affect    Labs:  - reviewed   Reference Range  06/06/20 08/09/23  12/10/24  01/09/25  03/04/25 04/29/25     TSH 0.380 - 5.330 uIU/mL 5.300 8.750 (H) 14.900 (H) 8.330 (H) 5.520 (H) 1.600    fT4 0.93 - 1.70 ng/dL   0.92 (L)  1.32     TPO-Abs 0.0 - 9.0 IU/mL   262.0 (H)       LT4       64 64     Assessment and Plan:  # Hypothyroidism secondary to Hashimoto's thyroiditis  - currently euthyroid on LT4 64 mcg/day vs weight base calculated dose ~ 110 mcg/day  - neither LT4 replacement therapy or thyroid pathology itself could be contributory for reported headaches or rash  - we discussed that autoimmune disorders tend to cluster, and patients with Hashimoto’s thyroiditis are more likely to have additional autoimmune dermatologic conditions, which are treated separately  - ok to transition back to generic LT4 as Synthroid is quite expensive  Plan:  Reassured the patient.   Transition Synthroid -> Levothyroxine 50 mc tab Mo-Fr, 2 tab on Sat - Sun ~ 64 mcg/day  Repeat TFTs in 2-3 mo.   Address headaches, rash separately, not related to thyroid pathology or its treatment.     - levothyroxine (SYNTHROID) 50 MCG Tab; Take 1 tab Mo - Fr, 2 tab Sat- Sun  Dispense: 110 Tablet; Refill: 4  - FREE THYROXINE; Future  - TSH; Future    # Headaches  # Skin rash  - will defer further evaluation and management to PCP, dermatologist, +/- neurologist  - neither LT4 replacement therapy or thyroid pathology itself could be contributory    RTC: 10 weeks    Total time (face-to-face and non-face-to face time): 45 min - discussion of diagnoses, treatment, prognosis, medical charts, lab review, documentation.    Plan reviewed with the patient and agreed with plan.  All questions answered to patient's satisfaction.  Thank you kindly for allowing me to  participate in the care plan for this patient.    Taty Pérez MD    CC:   KARUNA Moore

## 2025-05-07 ENCOUNTER — OFFICE VISIT (OUTPATIENT)
Dept: ENDOCRINOLOGY | Facility: MEDICAL CENTER | Age: 65
End: 2025-05-07
Attending: STUDENT IN AN ORGANIZED HEALTH CARE EDUCATION/TRAINING PROGRAM
Payer: MEDICARE

## 2025-05-07 VITALS
BODY MASS INDEX: 25.9 KG/M2 | HEART RATE: 81 BPM | WEIGHT: 151.7 LBS | DIASTOLIC BLOOD PRESSURE: 72 MMHG | OXYGEN SATURATION: 97 % | SYSTOLIC BLOOD PRESSURE: 116 MMHG | HEIGHT: 64 IN

## 2025-05-07 DIAGNOSIS — E06.3 HYPOTHYROIDISM DUE TO HASHIMOTO'S THYROIDITIS: ICD-10-CM

## 2025-05-07 PROCEDURE — 99212 OFFICE O/P EST SF 10 MIN: CPT | Performed by: STUDENT IN AN ORGANIZED HEALTH CARE EDUCATION/TRAINING PROGRAM

## 2025-05-07 PROCEDURE — 3074F SYST BP LT 130 MM HG: CPT | Performed by: STUDENT IN AN ORGANIZED HEALTH CARE EDUCATION/TRAINING PROGRAM

## 2025-05-07 PROCEDURE — 3078F DIAST BP <80 MM HG: CPT | Performed by: STUDENT IN AN ORGANIZED HEALTH CARE EDUCATION/TRAINING PROGRAM

## 2025-05-07 PROCEDURE — 99204 OFFICE O/P NEW MOD 45 MIN: CPT | Performed by: STUDENT IN AN ORGANIZED HEALTH CARE EDUCATION/TRAINING PROGRAM

## 2025-05-07 RX ORDER — LEVOTHYROXINE SODIUM 50 UG/1
TABLET ORAL
Qty: 110 TABLET | Refills: 4 | Status: SHIPPED | OUTPATIENT
Start: 2025-05-07

## 2025-05-07 ASSESSMENT — FIBROSIS 4 INDEX: FIB4 SCORE: 0.74

## 2025-05-19 ENCOUNTER — RESULTS FOLLOW-UP (OUTPATIENT)
Dept: MEDICAL GROUP | Facility: PHYSICIAN GROUP | Age: 65
End: 2025-05-19

## 2025-06-20 DIAGNOSIS — E06.3 HYPOTHYROIDISM DUE TO HASHIMOTO'S THYROIDITIS: ICD-10-CM

## 2025-06-22 RX ORDER — LEVOTHYROXINE SODIUM 50 UG/1
TABLET ORAL
Qty: 110 TABLET | Refills: 4 | Status: SHIPPED | OUTPATIENT
Start: 2025-06-22

## 2025-06-23 ENCOUNTER — TELEPHONE (OUTPATIENT)
Dept: ENDOCRINOLOGY | Facility: MEDICAL CENTER | Age: 65
End: 2025-06-23
Payer: MEDICARE

## 2025-06-23 NOTE — TELEPHONE ENCOUNTER
Pt called saying that her prescription for Levothyroxin has to be dispensed as written for her to be able to get generic. I advised that I would be calling pharmacy and if it needed to be recent, it would be [facility] providers next day in office

## 2025-07-27 NOTE — PROGRESS NOTES
Follow up Endocrinology Visit  Initial consult/last visit on: 5/7/25  Referred by: KARUNA Moore    Chief complaint:  Adelia Durant, 65 y.o., female, who is here for follow up for for hypothyroidism management    Interval history:   - since last visit     Hypothyroidism:  - was diagnosed in 2023 - subclinical -> 12/2024 - clinical -> was started on LT4 replacement therapy  - main concern - worsening of headaches that she was initially contributing to her thyroid medication, was transitioned from levothyroxine -> Synthroid without significant improvement; now admits that she noted another factors provoking her headaches  - she also noted worsening of her rash  - neck discomfort - denies  - FHx: mother had hypothyroidism    Current therapy:  Synthroid 50 mcg - 1 tab Mo - Fr, 2 tab Sat-Sun ~ 64 mcg/day    Medications:  Current Outpatient Medications:     levothyroxine (SYNTHROID) 50 MCG Tab, Take 1 tab Mo - Fr, 2 tab Sat- Sun, Disp: 110 Tablet, Rfl: 4    lisinopril-hydrochlorothiazide (PRINZIDE) 10-12.5 MG per tablet, TAKE 1 TABLET BY MOUTH EVERY DAY, Disp: 100 Tablet, Rfl: 1    triamcinolone acetonide (KENALOG) 0.1 % Ointment, Apply 1 Each topically 2 times a day., Disp: 80 g, Rfl: 1    estradiol (ESTRACE) 0.1 MG/GM vaginal cream, Insert  into the vagina every 72 hours., Disp: , Rfl:     loratadine (CLARITIN) 10 MG Tab, Take 10 mg by mouth as needed., Disp: , Rfl:     Ca Carbonate-Mag Hydroxide (ROLAIDS EXTRA STRENGTH) 675-135 MG Chew Tab, Chew 1 Tablet 1/2 hour after dinner., Disp: , Rfl:     cimetidine (TAGAMET HB) 200 MG tablet, Take 200 mg by mouth 2 times a day., Disp: , Rfl:     fexofenadine (ALLEGRA) 60 MG Tab, Take 60 mg by mouth as needed., Disp: , Rfl:     SODIUM BICARBONATE PO, Take 1 Tablet by mouth as needed. Pt unsure of dose, Disp: , Rfl:     Flaxseed, Linseed, (FLAXSEED OIL) 1000 MG Cap, Take 1 Capsule by mouth every morning., Disp: , Rfl:     Probiotic Product (PROBIOTIC-10  PO), Take 1 Tablet by mouth as needed., Disp: , Rfl:     Cholecalciferol (VITAMIN D-3) 1000 units Cap, Take 1 Capsule by mouth every morning., Disp: 30 Cap, Rfl: 11    Multiple Vitamins-Minerals (CENTRUM SILVER PO), Take 1 Tablet by mouth every morning., Disp: , Rfl:     Physical Examination:   Vital signs: LMP 2006   General: No distress, cooperative, well dressed and well nourished.   Resp: Normal effort  CVS: Regular rate and rhythm  Extremities: No edema bilateral extremities  Neuro: Alert and oriented  Skin: No rash, No Ulcers  Psych: Normal mood and affect    Labs:  - reviewed   Reference Range  06/06/20 08/09/23  12/10/24  01/09/25  03/04/25 04/29/25     TSH 0.380 - 5.330 uIU/mL 5.300 8.750 (H) 14.900 (H) 8.330 (H) 5.520 (H) 1.600    fT4 0.93 - 1.70 ng/dL   0.92 (L)  1.32     TPO-Abs 0.0 - 9.0 IU/mL   262.0 (H)       LT4       64 64     Assessment and Plan:  # Hypothyroidism secondary to Hashimoto's thyroiditis  -   Prior notes:  25  - currently euthyroid on LT4 64 mcg/day vs weight base calculated dose ~ 110 mcg/day  - neither LT4 replacement therapy or thyroid pathology itself could be contributory for reported headaches or rash  - we discussed that autoimmune disorders tend to cluster, and patients with Hashimoto’s thyroiditis are more likely to have additional autoimmune dermatologic conditions, which are treated separately  - ok to transition back to generic LT4 as Synthroid is quite expensive  Plan:  Reassured the patient.   Transition Synthroid -> Levothyroxine 50 mc tab Mo-Fr, 2 tab on Sat - Sun ~ 64 mcg/day  Repeat TFTs in 2-3 mo.   Address headaches, rash separately, not related to thyroid pathology or its treatment.     - levothyroxine (SYNTHROID) 50 MCG Tab; Take 1 tab Mo - Fr, 2 tab Sat- Sun  Dispense: 110 Tablet; Refill: 4  - FREE THYROXINE; Future  - TSH; Future    # Headaches  # Skin rash  -   Prior notes:  25  - will defer further evaluation and management to PCP,  dermatologist, +/- neurologist  - neither LT4 replacement therapy or thyroid pathology itself could be contributory    RTC: 10 weeks    Total time (face-to-face and non-face-to face time): 45 min - discussion of diagnoses, treatment, prognosis, medical charts, lab review, documentation.    Plan reviewed with the patient and agreed with plan.  All questions answered to patient's satisfaction.  Thank you kindly for allowing me to participate in the care plan for this patient.    Taty Pérez MD    CC:   KARUNA Moore

## 2025-07-30 ENCOUNTER — APPOINTMENT (OUTPATIENT)
Dept: ENDOCRINOLOGY | Facility: MEDICAL CENTER | Age: 65
End: 2025-07-30
Attending: STUDENT IN AN ORGANIZED HEALTH CARE EDUCATION/TRAINING PROGRAM
Payer: MEDICARE

## 2025-08-26 ENCOUNTER — HOSPITAL ENCOUNTER (OUTPATIENT)
Dept: LAB | Facility: MEDICAL CENTER | Age: 65
End: 2025-08-26
Attending: STUDENT IN AN ORGANIZED HEALTH CARE EDUCATION/TRAINING PROGRAM
Payer: MEDICARE

## 2025-08-26 DIAGNOSIS — E06.3 HYPOTHYROIDISM DUE TO HASHIMOTO'S THYROIDITIS: ICD-10-CM

## 2025-08-26 LAB
T4 FREE SERPL-MCNC: 1.4 NG/DL (ref 0.93–1.7)
TSH SERPL-ACNC: 0.18 UIU/ML (ref 0.38–5.33)

## 2025-08-26 PROCEDURE — 84443 ASSAY THYROID STIM HORMONE: CPT

## 2025-08-26 PROCEDURE — 36415 COLL VENOUS BLD VENIPUNCTURE: CPT

## 2025-08-26 PROCEDURE — 84439 ASSAY OF FREE THYROXINE: CPT

## 2025-09-17 ENCOUNTER — APPOINTMENT (OUTPATIENT)
Dept: MEDICAL GROUP | Facility: PHYSICIAN GROUP | Age: 65
End: 2025-09-17
Payer: MEDICARE

## (undated) DEVICE — GLOVE BIOGEL PI ORTHO SZ 6 SURGICAL PF LF (40PR/BX)

## (undated) DEVICE — COVER LIGHT HANDLE ALC PLUS DISP (18EA/BX)

## (undated) DEVICE — UTERINE MANIP V-CARE STANDARD DAVINCI (8EA/CA)

## (undated) DEVICE — DRAPE ARM  BOX OF 20

## (undated) DEVICE — NEEDLE INSFL 120MM 14GA VRRS - (20/BX)

## (undated) DEVICE — LACTATED RINGERS INJ 1000 ML - (14EA/CA 60CA/PF)

## (undated) DEVICE — DRIVER LARGE SUTURECUT NEEDLE (15UN/EA)

## (undated) DEVICE — SLEEVE VASO CALF MED - (10PR/CA)

## (undated) DEVICE — BIPOLAR LONG (14UN/EA)

## (undated) DEVICE — WATER IRRIGATION STERILE 1000ML (12EA/CA)

## (undated) DEVICE — SUTURE GENERAL

## (undated) DEVICE — OBTURATOR BLADELESS STANDARD 8MM (6EA/BX)

## (undated) DEVICE — SET EXTENSION WITH 2 PORTS (48EA/CA) ***PART #2C8610 IS A SUBSTITUTE*****

## (undated) DEVICE — SUTURE 0 VICRYL PLUS CT-2 - 27 INCH (36/BX)

## (undated) DEVICE — SENSOR OXIMETER ADULT SPO2 RD SET (20EA/BX)

## (undated) DEVICE — TUBING CLEARLINK DUO-VENT - C-FLO (48EA/CA)

## (undated) DEVICE — DRAPE COLUMN  BOX OF 20

## (undated) DEVICE — ARMREST CRADLE FOAM - (2PR/PK 12PR/CA)

## (undated) DEVICE — COVER TIP ENDOWRIST HOT SHEAR - (10EA/BX) DA VINCI

## (undated) DEVICE — ELECTRODE DUAL RETURN W/ CORD - (50/PK)

## (undated) DEVICE — PAD OR TABLE DA VINCI 2IN X 20IN X 72IN - (12EA/CA)

## (undated) DEVICE — DRESSING NON-ADHERING 8 X 3 - (50/BX)

## (undated) DEVICE — SUTURE 2-0 20CM STRATAFIX SPIRAL SH NEEDLE (12/BX)

## (undated) DEVICE — PACK GYN DAVINCI (1EA/CA)

## (undated) DEVICE — GOWN WARMING STANDARD FLEX - (30/CA)

## (undated) DEVICE — SHEARS MONOPOLAR CURVED  DA VINCI 10X'S REUSABLE

## (undated) DEVICE — PACK TRENGUARD 450 PROCEDURE (12EA/CA)

## (undated) DEVICE — SET LEADWIRE 5 LEAD BEDSIDE DISPOSABLE ECG (1SET OF 5/EA)

## (undated) DEVICE — DRESSING TRANSPARENT FILM TEGADERM 2.375 X 2.75"  (100EA/BX)"

## (undated) DEVICE — DERMABOND ADVANCED - (12EA/BX)

## (undated) DEVICE — SUCTION INSTRUMENT YANKAUER BULBOUS TIP W/O VENT (50EA/CA)

## (undated) DEVICE — CANISTER SUCTION 3000ML MECHANICAL FILTER AUTO SHUTOFF MEDI-VAC NONSTERILE LF DISP  (40EA/CA)